# Patient Record
Sex: FEMALE | Race: WHITE | NOT HISPANIC OR LATINO | Employment: FULL TIME | URBAN - METROPOLITAN AREA
[De-identification: names, ages, dates, MRNs, and addresses within clinical notes are randomized per-mention and may not be internally consistent; named-entity substitution may affect disease eponyms.]

---

## 2019-12-05 ENCOUNTER — ALLIED HEALTH/NURSE VISIT (OUTPATIENT)
Dept: FAMILY MEDICINE | Facility: CLINIC | Age: 29
End: 2019-12-05
Payer: COMMERCIAL

## 2019-12-05 VITALS
HEART RATE: 80 BPM | DIASTOLIC BLOOD PRESSURE: 52 MMHG | WEIGHT: 156.8 LBS | HEIGHT: 65 IN | TEMPERATURE: 97.7 F | BODY MASS INDEX: 26.12 KG/M2 | SYSTOLIC BLOOD PRESSURE: 92 MMHG

## 2019-12-05 DIAGNOSIS — N91.2 ABSENCE OF MENSTRUATION: Primary | ICD-10-CM

## 2019-12-05 LAB — HCG UR QL: POSITIVE

## 2019-12-05 PROCEDURE — 81025 URINE PREGNANCY TEST: CPT | Performed by: FAMILY MEDICINE

## 2019-12-05 PROCEDURE — 99207 ZZC NO CHARGE NURSE ONLY: CPT

## 2019-12-05 SDOH — HEALTH STABILITY: MENTAL HEALTH: HOW OFTEN DO YOU HAVE 6 OR MORE DRINKS ON ONE OCCASION?: NEVER

## 2019-12-05 SDOH — HEALTH STABILITY: MENTAL HEALTH: HOW OFTEN DO YOU HAVE A DRINK CONTAINING ALCOHOL?: NEVER

## 2019-12-05 ASSESSMENT — MIFFLIN-ST. JEOR: SCORE: 1434.18

## 2019-12-05 NOTE — PROGRESS NOTES
Kassandra Mead is a 29 year old here today for a pregnancy test.  LMP: Patient's last menstrual period was 2019.  Wt: 156 lbs 12.8 oz.    Symptoms include absence of menses and low back pain.    Kassandra informed of positive pregnancy test results. DENY: 2020    Educational advice given: smoking.    Current medications reviewed: Yes    Previous pregnancy history remarkable for: none.    Plan: schedule appointment with OB Educator and/or OB class, take multivitamin or pre- vitamins and OB Education packet given.    She is to call back if she has any questions or concerns.  She is advised to notify a provider immediately if she experiences any severe cramping or abdominal pain or any vaginal bleeding.

## 2019-12-09 ENCOUNTER — PRENATAL OFFICE VISIT (OUTPATIENT)
Dept: FAMILY MEDICINE | Facility: CLINIC | Age: 29
End: 2019-12-09
Payer: COMMERCIAL

## 2019-12-09 VITALS — HEIGHT: 64 IN | BODY MASS INDEX: 26.98 KG/M2 | WEIGHT: 158 LBS

## 2019-12-09 DIAGNOSIS — Z34.90 SUPERVISION OF NORMAL PREGNANCY: Primary | ICD-10-CM

## 2019-12-09 LAB
ABO + RH BLD: NORMAL
ABO + RH BLD: NORMAL
BLD GP AB SCN SERPL QL: NORMAL
BLOOD BANK CMNT PATIENT-IMP: NORMAL
ERYTHROCYTE [DISTWIDTH] IN BLOOD BY AUTOMATED COUNT: 11.9 % (ref 10–15)
HCT VFR BLD AUTO: 45.2 % (ref 35–47)
HGB BLD-MCNC: 15.7 G/DL (ref 11.7–15.7)
MCH RBC QN AUTO: 31.8 PG (ref 26.5–33)
MCHC RBC AUTO-ENTMCNC: 34.7 G/DL (ref 31.5–36.5)
MCV RBC AUTO: 92 FL (ref 78–100)
PLATELET # BLD AUTO: 277 10E9/L (ref 150–450)
RBC # BLD AUTO: 4.93 10E12/L (ref 3.8–5.2)
SPECIMEN EXP DATE BLD: NORMAL
WBC # BLD AUTO: 7.5 10E9/L (ref 4–11)

## 2019-12-09 PROCEDURE — 86780 TREPONEMA PALLIDUM: CPT | Performed by: OBSTETRICS & GYNECOLOGY

## 2019-12-09 PROCEDURE — 87389 HIV-1 AG W/HIV-1&-2 AB AG IA: CPT | Performed by: OBSTETRICS & GYNECOLOGY

## 2019-12-09 PROCEDURE — 86850 RBC ANTIBODY SCREEN: CPT | Performed by: OBSTETRICS & GYNECOLOGY

## 2019-12-09 PROCEDURE — 86901 BLOOD TYPING SEROLOGIC RH(D): CPT | Performed by: OBSTETRICS & GYNECOLOGY

## 2019-12-09 PROCEDURE — 85027 COMPLETE CBC AUTOMATED: CPT | Performed by: OBSTETRICS & GYNECOLOGY

## 2019-12-09 PROCEDURE — 87086 URINE CULTURE/COLONY COUNT: CPT | Performed by: OBSTETRICS & GYNECOLOGY

## 2019-12-09 PROCEDURE — 87340 HEPATITIS B SURFACE AG IA: CPT | Performed by: OBSTETRICS & GYNECOLOGY

## 2019-12-09 PROCEDURE — 86762 RUBELLA ANTIBODY: CPT | Performed by: OBSTETRICS & GYNECOLOGY

## 2019-12-09 PROCEDURE — 99207 ZZC NO CHARGE NURSE ONLY: CPT

## 2019-12-09 PROCEDURE — 86900 BLOOD TYPING SEROLOGIC ABO: CPT | Performed by: OBSTETRICS & GYNECOLOGY

## 2019-12-09 PROCEDURE — 36415 COLL VENOUS BLD VENIPUNCTURE: CPT | Performed by: OBSTETRICS & GYNECOLOGY

## 2019-12-09 ASSESSMENT — MIFFLIN-ST. JEOR: SCORE: 1431.68

## 2019-12-10 LAB
HBV SURFACE AG SERPL QL IA: NONREACTIVE
HIV 1+2 AB+HIV1 P24 AG SERPL QL IA: NONREACTIVE
RUBV IGG SERPL IA-ACNC: 13 IU/ML
T PALLIDUM AB SER QL: NONREACTIVE

## 2019-12-11 ENCOUNTER — APPOINTMENT (OUTPATIENT)
Dept: ULTRASOUND IMAGING | Facility: CLINIC | Age: 29
End: 2019-12-11
Attending: FAMILY MEDICINE
Payer: COMMERCIAL

## 2019-12-11 ENCOUNTER — NURSE TRIAGE (OUTPATIENT)
Dept: NURSING | Facility: CLINIC | Age: 29
End: 2019-12-11

## 2019-12-11 ENCOUNTER — HOSPITAL ENCOUNTER (EMERGENCY)
Facility: CLINIC | Age: 29
Discharge: HOME | End: 2019-12-11
Attending: FAMILY MEDICINE | Admitting: FAMILY MEDICINE
Payer: COMMERCIAL

## 2019-12-11 VITALS
TEMPERATURE: 98.6 F | OXYGEN SATURATION: 97 % | RESPIRATION RATE: 18 BRPM | SYSTOLIC BLOOD PRESSURE: 127 MMHG | HEART RATE: 80 BPM | DIASTOLIC BLOOD PRESSURE: 68 MMHG

## 2019-12-11 DIAGNOSIS — N93.9 VAGINAL BLEEDING: ICD-10-CM

## 2019-12-11 DIAGNOSIS — O20.0 THREATENED MISCARRIAGE IN EARLY PREGNANCY: ICD-10-CM

## 2019-12-11 LAB
ALBUMIN UR-MCNC: NEGATIVE MG/DL
ANION GAP SERPL CALCULATED.3IONS-SCNC: 7 MMOL/L (ref 3–14)
APPEARANCE UR: CLEAR
B-HCG SERPL-ACNC: 6742 IU/L (ref 0–5)
BACTERIA SPEC CULT: NO GROWTH
BASOPHILS # BLD AUTO: 0 10E9/L (ref 0–0.2)
BASOPHILS NFR BLD AUTO: 0.4 %
BILIRUB UR QL STRIP: NEGATIVE
BUN SERPL-MCNC: 9 MG/DL (ref 7–30)
CALCIUM SERPL-MCNC: 8.2 MG/DL (ref 8.5–10.1)
CHLORIDE SERPL-SCNC: 105 MMOL/L (ref 94–109)
CO2 SERPL-SCNC: 23 MMOL/L (ref 20–32)
COLOR UR AUTO: ABNORMAL
CREAT SERPL-MCNC: 0.67 MG/DL (ref 0.52–1.04)
DIFFERENTIAL METHOD BLD: ABNORMAL
EOSINOPHIL NFR BLD AUTO: 1.1 %
ERYTHROCYTE [DISTWIDTH] IN BLOOD BY AUTOMATED COUNT: 11.9 % (ref 10–15)
GFR SERPL CREATININE-BSD FRML MDRD: >90 ML/MIN/{1.73_M2}
GLUCOSE SERPL-MCNC: 85 MG/DL (ref 70–99)
GLUCOSE UR STRIP-MCNC: NEGATIVE MG/DL
HCT VFR BLD AUTO: 46.5 % (ref 35–47)
HGB BLD-MCNC: 16.2 G/DL (ref 11.7–15.7)
HGB UR QL STRIP: ABNORMAL
IMM GRANULOCYTES # BLD: 0 10E9/L (ref 0–0.4)
IMM GRANULOCYTES NFR BLD: 0.2 %
KETONES UR STRIP-MCNC: NEGATIVE MG/DL
LEUKOCYTE ESTERASE UR QL STRIP: NEGATIVE
LYMPHOCYTES # BLD AUTO: 2.1 10E9/L (ref 0.8–5.3)
LYMPHOCYTES NFR BLD AUTO: 22.7 %
Lab: NORMAL
MCH RBC QN AUTO: 31.6 PG (ref 26.5–33)
MCHC RBC AUTO-ENTMCNC: 34.8 G/DL (ref 31.5–36.5)
MCV RBC AUTO: 91 FL (ref 78–100)
MONOCYTES # BLD AUTO: 0.6 10E9/L (ref 0–1.3)
MONOCYTES NFR BLD AUTO: 6.6 %
MUCOUS THREADS #/AREA URNS LPF: PRESENT /LPF
NEUTROPHILS # BLD AUTO: 6.4 10E9/L (ref 1.6–8.3)
NEUTROPHILS NFR BLD AUTO: 69 %
NITRATE UR QL: NEGATIVE
NRBC # BLD AUTO: 0 10*3/UL
NRBC BLD AUTO-RTO: 0 /100
PH UR STRIP: 6 PH (ref 5–7)
PLATELET # BLD AUTO: 272 10E9/L (ref 150–450)
POTASSIUM SERPL-SCNC: 3.7 MMOL/L (ref 3.4–5.3)
RBC # BLD AUTO: 5.12 10E12/L (ref 3.8–5.2)
RBC #/AREA URNS AUTO: 9 /HPF (ref 0–2)
SODIUM SERPL-SCNC: 135 MMOL/L (ref 133–144)
SOURCE: ABNORMAL
SP GR UR STRIP: 1.01 (ref 1–1.03)
SPECIMEN SOURCE: NORMAL
SQUAMOUS #/AREA URNS AUTO: <1 /HPF (ref 0–1)
UROBILINOGEN UR STRIP-MCNC: 0 MG/DL (ref 0–2)
WBC # BLD AUTO: 9.3 10E9/L (ref 4–11)
WBC #/AREA URNS AUTO: 2 /HPF (ref 0–5)

## 2019-12-11 PROCEDURE — 80048 BASIC METABOLIC PNL TOTAL CA: CPT | Performed by: FAMILY MEDICINE

## 2019-12-11 PROCEDURE — 99284 EMERGENCY DEPT VISIT MOD MDM: CPT | Mod: 25 | Performed by: FAMILY MEDICINE

## 2019-12-11 PROCEDURE — 81001 URINALYSIS AUTO W/SCOPE: CPT | Performed by: FAMILY MEDICINE

## 2019-12-11 PROCEDURE — 99284 EMERGENCY DEPT VISIT MOD MDM: CPT | Mod: Z6 | Performed by: FAMILY MEDICINE

## 2019-12-11 PROCEDURE — 76801 OB US < 14 WKS SINGLE FETUS: CPT

## 2019-12-11 PROCEDURE — 85025 COMPLETE CBC W/AUTO DIFF WBC: CPT | Performed by: FAMILY MEDICINE

## 2019-12-11 PROCEDURE — 84702 CHORIONIC GONADOTROPIN TEST: CPT | Performed by: FAMILY MEDICINE

## 2019-12-11 ASSESSMENT — ENCOUNTER SYMPTOMS
RESPIRATORY NEGATIVE: 1
FATIGUE: 0
DIARRHEA: 0
PSYCHIATRIC NEGATIVE: 1
FEVER: 0
CHILLS: 0
FLANK PAIN: 0
ABDOMINAL DISTENTION: 0
CARDIOVASCULAR NEGATIVE: 1
NAUSEA: 0
BLOOD IN STOOL: 0
ABDOMINAL PAIN: 1
EYES NEGATIVE: 1
MUSCULOSKELETAL NEGATIVE: 1
ENDOCRINE NEGATIVE: 1
NEUROLOGICAL NEGATIVE: 1
VOMITING: 0
APPETITE CHANGE: 0

## 2019-12-11 NOTE — TELEPHONE ENCOUNTER
Pt calls in with concern of abdominal pain - cramping     As well as spotting     Pt states she is 5 weeks pregnant   With first US scheduled for January 3    Pt says abdominal pain/cramping started approx 2 hours ago - fairly constant   Rates pain - 7 or 8 / 10    Also C/O spotting > when wiped there was blood on tissue     Per protocol pt advised to go to ED for evaluation     Protocol and care advice reviewed  Caller states understanding of the recommended dispositionAdvised to call back if further questions or concerns    Chris Henriquez RN / [place] Nurse Advisor          Reason for Disposition    MODERATE-SEVERE abdominal pain (e.g., interferes with normal activities, awakens from sleep)    Additional Information    Negative: Passed out (i.e., lost consciousness, collapsed and was not responding)    Negative: Shock suspected (e.g., cold/pale/clammy skin, too weak to stand, low BP, rapid pulse)    Negative: Difficult to awaken or acting confused (e.g., disoriented, slurred speech)    Negative: Sounds like a life-threatening emergency to the triager    Protocols used: PREGNANCY - ABDOMINAL PAIN LESS THAN 20 WEEKS EGA-A-

## 2019-12-11 NOTE — ED AVS SNAPSHOT
[facility] Emergency Department  [address] [place] [address]  Phone:  [phone]  Fax:  [phone]                                    Kassandra Mead   MRN: [id number]    Department:  [facility] Emergency Department   Date of Visit:  12/11/2019           After Visit Summary Signature Page    I have received my discharge instructions, and my questions have been answered. I have discussed any challenges I see with this plan with the nurse or doctor.    ..........................................................................................................................................  Patient/Patient Representative Signature      ..........................................................................................................................................  Patient Representative Print Name and Relationship to Patient    ..................................................               ................................................  Date                                   Time    ..........................................................................................................................................  Reviewed by Signature/Title    ...................................................              ..............................................  Date                                               Time          22EPIC Rev 08/18

## 2019-12-12 NOTE — ED TRIAGE NOTES
Presents to ED with cramping that started at 1530 and noticed vaginal bleeding around 1620. Patient reports when she was evaluated last week and estimates 4-5 weeks pregnant.

## 2019-12-12 NOTE — ED PROVIDER NOTES
History     Chief Complaint   Patient presents with     Vaginal Bleeding     The history is provided by the patient.     Kassandra Mead is a 29 year old female who presents to the ER with concerns about vaginal bleeding and abdominal cramping. The patient states that she is about four to five weeks pregnant. She is in the ED today because she has a steady pain in the right side of her abdomen that started around 1530. While she was at work, around 1620, her vaginal bleeding started. 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 This is the patient's fourth pregnancy. She notes having no previous issues with bleeding or complications with this pregnancy or during her other pregnancies. She denies any new medications other than prenatal vitamins. The patient was seen by OB two days ago and was told everything looked normal other than her blood pressure being slightly low. She has not felt weak or dizzy. No urinary symptoms. She claims that the vaginal bleeding was like a normal period at first, but is now just spotting.         Birth Date: [birth year] Age (as of 19): 29 Ethnicity: American Race: White   History:  Estimated Date of Delivery: 20 Gestational Age: 5w1d Blood Type: A Pos      Absence of menstruation   Dx   Pregnancy Test   Reason for Visit    Progress Notes   Rosibel Judd, RN (Registered Nurse)      Kassandra Mead is a 29 year old here today for a pregnancy test.  LMP: Patient's last menstrual period was 2019.  Wt: 156 lbs 12.8 oz.     Symptoms include absence of menses and low back pain.     Kassandra informed of positive pregnancy test results. DENY: 2020             Allergies:  No Known Allergies    Problem List:    There are no active problems to display for this patient.       Past Medical History:    Past Medical History:   Diagnosis Date     NO ACTIVE PROBLEMS        Past Surgical History:    Past Surgical History:   Procedure  "Laterality Date     NO HISTORY OF SURGERY         Family History:    Family History   Problem Relation Age of Onset     Alopecia Mother      Cervical Cancer Mother      Asthma Father      No Known Problems Sister      Blood Disease Maternal Grandmother      No Known Problems Paternal Grandmother      Heart Disease Paternal Grandfather         \"valve problem\"     No Known Problems Daughter      Asthma Daughter         \"septic\"     No Known Problems Son        Social History:  Marital Status:  Single [1]  Social History     Tobacco Use     Smoking status: Current Every Day Smoker     Packs/day: 1.00     Years: 13.00     Pack years: 13.00     Start date: 12/5/2006     Smokeless tobacco: Never Used   Substance Use Topics     Alcohol use: Not Currently     Frequency: Never     Binge frequency: Never     Drug use: Not Currently        Medications:    Prenatal w/o A Vit-Fe Fum-FA (PRENATA PO)          Review of Systems   Constitutional: Negative for appetite change, chills, fatigue and fever.   HENT: Negative.    Eyes: Negative.    Respiratory: Negative.    Cardiovascular: Negative.    Gastrointestinal: Positive for abdominal pain (RLQ -suprapubic area pain). Negative for abdominal distention, blood in stool, diarrhea, nausea and vomiting.   Endocrine: Negative.    Genitourinary: Positive for pelvic pain (Right-Mid area) and vaginal bleeding. Negative for flank pain.   Musculoskeletal: Negative.    Skin: Negative.    Neurological: Negative.    Psychiatric/Behavioral: Negative.    All other systems reviewed and are negative.      Physical Exam   BP: 127/68  Pulse: 80  Heart Rate: 86  Temp: 98.6  F (37  C)  Resp: 18  SpO2: 97 %      Physical Exam  Vitals signs and nursing note reviewed.   Constitutional:       General: She is in acute distress (Mild - Refused offer of pain medication at the time of exam.).      Appearance: Normal appearance. She is normal weight. She is not ill-appearing, toxic-appearing or diaphoretic. "   HENT:      Head: Normocephalic and atraumatic.      Mouth/Throat:      Mouth: Mucous membranes are moist.   Eyes:      Extraocular Movements: Extraocular movements intact.      Conjunctiva/sclera: Conjunctivae normal.      Pupils: Pupils are equal, round, and reactive to light.   Neck:      Musculoskeletal: Normal range of motion and neck supple.   Cardiovascular:      Rate and Rhythm: Normal rate.   Pulmonary:      Effort: Pulmonary effort is normal. No respiratory distress.   Abdominal:      General: Bowel sounds are normal. There is no distension.      Palpations: There is no mass.      Tenderness: There is abdominal tenderness in the right lower quadrant and suprapubic area. There is no right CVA tenderness, left CVA tenderness, guarding or rebound.      Hernia: No hernia is present.       Musculoskeletal: Normal range of motion.   Skin:     General: Skin is warm.      Capillary Refill: Capillary refill takes less than 2 seconds.   Neurological:      General: No focal deficit present.      Mental Status: She is alert and oriented to person, place, and time.   Psychiatric:         Mood and Affect: Mood normal.         Behavior: Behavior normal.         Thought Content: Thought content normal.         Judgment: Judgment normal.         ED Course        Procedures            Critical Care time:  none            Results for orders placed or performed during the hospital encounter of 12/11/19 (from the past 24 hour(s))   UA reflex to Microscopic and Culture   Result Value Ref Range    Color Urine Straw     Appearance Urine Clear     Glucose Urine Negative NEG^Negative mg/dL    Bilirubin Urine Negative NEG^Negative    Ketones Urine Negative NEG^Negative mg/dL    Specific Gravity Urine 1.006 1.003 - 1.035    Blood Urine Large (A) NEG^Negative    pH Urine 6.0 5.0 - 7.0 pH    Protein Albumin Urine Negative NEG^Negative mg/dL    Urobilinogen mg/dL 0.0 0.0 - 2.0 mg/dL    Nitrite Urine Negative NEG^Negative    Leukocyte  Esterase Urine Negative NEG^Negative    Source Midstream Urine     RBC Urine 9 (H) 0 - 2 /HPF    WBC Urine 2 0 - 5 /HPF    Squamous Epithelial /HPF Urine <1 0 - 1 /HPF    Mucous Urine Present (A) NEG^Negative /LPF   CBC with platelets differential   Result Value Ref Range    WBC 9.3 4.0 - 11.0 10e9/L    RBC Count 5.12 3.8 - 5.2 10e12/L    Hemoglobin 16.2 (H) 11.7 - 15.7 g/dL    Hematocrit 46.5 35.0 - 47.0 %    MCV 91 78 - 100 fl    MCH 31.6 26.5 - 33.0 pg    MCHC 34.8 31.5 - 36.5 g/dL    RDW 11.9 10.0 - 15.0 %    Platelet Count 272 150 - 450 10e9/L    Diff Method Automated Method     % Neutrophils 69.0 %    % Lymphocytes 22.7 %    % Monocytes 6.6 %    % Eosinophils 1.1 %    % Basophils 0.4 %    % Immature Granulocytes 0.2 %    Nucleated RBCs 0 0 /100    Absolute Neutrophil 6.4 1.6 - 8.3 10e9/L    Absolute Lymphocytes 2.1 0.8 - 5.3 10e9/L    Absolute Monocytes 0.6 0.0 - 1.3 10e9/L    Absolute Basophils 0.0 0.0 - 0.2 10e9/L    Abs Immature Granulocytes 0.0 0 - 0.4 10e9/L    Absolute Nucleated RBC 0.0    Basic metabolic panel   Result Value Ref Range    Sodium 135 133 - 144 mmol/L    Potassium 3.7 3.4 - 5.3 mmol/L    Chloride 105 94 - 109 mmol/L    Carbon Dioxide 23 20 - 32 mmol/L    Anion Gap 7 3 - 14 mmol/L    Glucose 85 70 - 99 mg/dL    Urea Nitrogen 9 7 - 30 mg/dL    Creatinine 0.67 0.52 - 1.04 mg/dL    GFR Estimate >90 >60 mL/min/[1.73_m2]    GFR Estimate If Black >90 >60 mL/min/[1.73_m2]    Calcium 8.2 (L) 8.5 - 10.1 mg/dL   HCG quantitative pregnancy   Result Value Ref Range    HCG Quantitative Serum 6,742 (H) 0 - 5 IU/L   US OB <14 Weeks W Transvaginal    Narrative    ULTRASOUND OBSTETRIC FIRST TRIMESTER WITH TRANSVAGINAL  12/11/2019  9:05 PM    HISTORY: Right pelvic pain, early pregnancy. Vaginal bleeding.    TECHNIQUE: Transabdominal and transvaginal imaging were performed.   Transvaginal imaging was performed to better evaluate the uterus and  gestational sac.    COMPARISON:  None.     FINDINGS:       Estimated gestational age by current ultrasound measurement: 5 weeks 2  days   by mean sac diameter.  Crown-rump length: Not seen.  Embryonic cardiac activity: Not seen.   Yolk sac: Present. Unremarkable shape.  Subchorionic hemorrhage: Subchorionic hemorrhages on the right  measuring 1.4 x 0.7 x 1.3 cm, and right lateral measuring 1.3 x 1.3 x  0.5 cm  Amniotic fluid volume:  Unremarkable for gestational age.    Right ovary: Unremarkable.  Left ovary: Unremarkable.  Adnexal mass: None.  Free pelvic fluid: Small amount.      Impression    IMPRESSION: Gestational sac in the uterus, however a definitive fetal  pole is not identified. Close interval followup recommended to confirm  development of the fetal pole. No evidence for hemorrhage.      NIKI SHERMAN MD           Assessments & Plan (with Medical Decision Making)  21-year-old female to the ER secondary concerns of some lower abdominal cramping symptoms as well as vaginal bleeding that is started earlier this evening.  The bleeding has now slowed.  Patient states that she is with a early pregnancy.  States that this is her fourth pregnancy.  Patient with exam findings consistent with intrauterine pregnancy however it is very early at just over 5 weeks gestational age.  No fetal pole is identified today.  There is some evidence for subchorionic bleeding.  I spoke with the patient about the possibility of pending miscarriage but also the possibility is just too young to see evidence of the fetus forming as yet and that the pregnancy could progress normally.  We did talk about blighted ovum as a possibility.  We also spent some time talking about miscarriage and signs and symptoms as well as what would be concerning and when to return to the ER if noted.  I placed a future order in the epic EMR for repeat beta-hCG level to be done on Monday with results to be forwarded to her obstetrician.  She felt comfortable with the plan of care and desired to return home at  this time.  Her bleeding had essentially stopped during the ER stay.     I have reviewed the nursing notes.    I have reviewed the findings, diagnosis, plan and need for follow up with the patient.       Final diagnoses:   Threatened miscarriage in early pregnancy   Vaginal bleeding           This document serves as a record of services personally performed by Michael Borrego,*. It was created on their behalf by Karlene Foley, a trained medical scribe. The creation of this record is based on the provider's personal observations and the statements of the patient. This document has been checked and approved by the attending provider.  Note: Chart documentation done in part with Dragon Voice Recognition software. Although reviewed after completion, some word and grammatical errors may remain.  12/11/2019   [facility] EMERGENCY DEPARTMENT     Michael Borrego,   12/12/19 0202

## 2019-12-12 NOTE — DISCHARGE INSTRUCTIONS
Please read and follow the handout(s) instructions. Return, if needed, for increased or worsening symptoms and as directed by the handout(s).    Follow-up ion the lab for the repeat pregnancy blood test this next week - Monday

## 2019-12-14 ENCOUNTER — OFFICE VISIT (OUTPATIENT)
Dept: URGENT CARE | Facility: RETAIL CLINIC | Age: 29
End: 2019-12-14
Payer: COMMERCIAL

## 2019-12-14 VITALS
SYSTOLIC BLOOD PRESSURE: 106 MMHG | OXYGEN SATURATION: 94 % | TEMPERATURE: 98.1 F | HEART RATE: 104 BPM | DIASTOLIC BLOOD PRESSURE: 67 MMHG

## 2019-12-14 DIAGNOSIS — J20.9 ACUTE BRONCHITIS WITH COEXISTING CONDITION REQUIRING PROPHYLACTIC TREATMENT: Primary | ICD-10-CM

## 2019-12-14 DIAGNOSIS — R05.9 COUGH: ICD-10-CM

## 2019-12-14 LAB
FLUAV AG UPPER RESP QL IA.RAPID: NORMAL
FLUBV AG UPPER RESP QL IA.RAPID: NORMAL

## 2019-12-14 PROCEDURE — 87804 INFLUENZA ASSAY W/OPTIC: CPT | Mod: QW | Performed by: NURSE PRACTITIONER

## 2019-12-14 PROCEDURE — 99203 OFFICE O/P NEW LOW 30 MIN: CPT | Performed by: NURSE PRACTITIONER

## 2019-12-14 RX ORDER — AZITHROMYCIN 250 MG/1
TABLET, FILM COATED ORAL
Qty: 6 TABLET | Refills: 0 | Status: SHIPPED | OUTPATIENT
Start: 2019-12-14 | End: 2020-01-20

## 2019-12-14 ASSESSMENT — ENCOUNTER SYMPTOMS
FATIGUE: 0
FEVER: 0
SINUS PAIN: 0
CHILLS: 1
DIZZINESS: 0
SLEEP DISTURBANCE: 1
ADENOPATHY: 0
WHEEZING: 1
HEADACHES: 0
SORE THROAT: 0
HEARTBURN: 0
CHEST TIGHTNESS: 0
SHORTNESS OF BREATH: 0
MYALGIAS: 1
COUGH: 1
DIAPHORESIS: 1
EYE ITCHING: 0

## 2019-12-14 NOTE — PROGRESS NOTES
Chief Complaint   Patient presents with     Cough     started yesterday     SUBJECTIVE:  Kassandra Mead is a 29 year old female who presents to the clinic today with a chief complaint of cough, thick mucus, wheezing, feverish, body aches for 2 days. This feels like her typical acute bronchitis, she says if she waits too long for antibiotic treatment it will develop into pneumonia. She is pregnant.  The patient's symptoms are moderate and worsening.  The patient's symptoms are exacerbated by no particular triggers.  Patient has been using nothing to improve symptoms.  Predisposing factors include: current everyday smoker.    Past Medical History:   Diagnosis Date     NO ACTIVE PROBLEMS      Prenatal w/o A Vit-Fe Fum-FA (PRENATA PO),     No current facility-administered medications on file prior to visit.     Social History     Tobacco Use     Smoking status: Current Every Day Smoker     Packs/day: 1.00     Years: 13.00     Pack years: 13.00     Start date: 12/5/2006     Smokeless tobacco: Never Used   Substance Use Topics     Alcohol use: Not Currently     Frequency: Never     Binge frequency: Never     No Known Allergies    Review of Systems   Constitutional: Positive for chills and diaphoresis. Negative for fatigue and fever.   HENT: Positive for congestion. Negative for ear pain, postnasal drip, sinus pain and sore throat.    Eyes: Negative for itching.   Respiratory: Positive for cough and wheezing. Negative for chest tightness and shortness of breath.    Gastrointestinal: Negative for heartburn.   Musculoskeletal: Positive for myalgias.   Allergic/Immunologic: Negative for environmental allergies.   Neurological: Negative for dizziness and headaches.   Hematological: Negative for adenopathy.   Psychiatric/Behavioral: Positive for sleep disturbance.     /67   Pulse 104   Temp 98.1  F (36.7  C) (Oral)   LMP 11/05/2019   SpO2 94%     Physical Exam  Constitutional:       General: She is not in acute  distress.     Appearance: She is well-developed. She is not diaphoretic.   HENT:      Head: Normocephalic and atraumatic.      Right Ear: External ear normal.      Left Ear: External ear normal.      Nose: Congestion present.      Mouth/Throat:      Mouth: Mucous membranes are moist.      Pharynx: Oropharynx is clear.   Eyes:      Pupils: Pupils are equal, round, and reactive to light.   Neck:      Musculoskeletal: Normal range of motion and neck supple.   Cardiovascular:      Rate and Rhythm: Normal rate.   Pulmonary:      Effort: Respiratory distress (occasional cough with upper airway congestion) present.      Breath sounds: Normal breath sounds. No stridor. No wheezing, rhonchi or rales.   Chest:      Chest wall: No tenderness.   Musculoskeletal: Normal range of motion.   Lymphadenopathy:      Cervical: No cervical adenopathy.   Skin:     General: Skin is warm and dry.      Capillary Refill: Capillary refill takes less than 2 seconds.      Coloration: Skin is not pale.      Findings: No rash.   Neurological:      General: No focal deficit present.      Mental Status: She is alert and oriented to person, place, and time.      Coordination: Coordination normal.   Psychiatric:         Mood and Affect: Mood normal.         Behavior: Behavior normal.       Results for orders placed or performed in visit on 12/14/19   INFLUENZA A/B ANTIGEN     Status: Normal   Result Value Ref Range    Influenza A neg neg    Influenza B neg neg     ASSESSMENT:    ICD-10-CM    1. Acute bronchitis with coexisting condition requiring prophylactic treatment J20.9 azithromycin (ZITHROMAX) 250 MG tablet   2. Cough R05 INFLUENZA A/B ANTIGEN     PLAN:   Patient Instructions   Flu test was negative will treat as typical acute bronchitis given history and pregnancy.  Continue albuterol every 4-6 hours as needed.  Use Tylenol as needed for pain and fever relief.  Drink plenty of fluids (warm fluids like tea or soup are soothing and reduce  cough)  Rest! Your body needs more rest to heal.  Sit in the bathroom with a hot shower running and breathe in the steam.  Saline drops or spay may help to clear nasal passages.  Honey may soothe your sore throat and help manage your cough- may take straight or in warm water with lemon juice.  Mucinex or Robitussin (guiafenesin) thin mucus and may help it to loosen more quickly  Avoid smoke (cigarettes, bonfires, fireplace, wood burning stoves).  It may take 14 days for symptoms to completely go away.  A cough may persist for 3-4 weeks.  Good handwashing is the best way to prevent spread of germs.  Let OBGYN know of treatment plan  Follow up with your pcp if symptoms worsen or fail to improve as expected.  ED if no improvement in 24 hours, worsening shortness of breath, chest pain, brick sputum, dizziness, high fever    Follow up with primary care provider with any problems, questions or concerns or if symptoms worsen or fail to improve. Patient agreed to plan and verbalized understanding.    Brea Ramirez, VALENTINA-BC  [facility] [place]

## 2019-12-14 NOTE — PATIENT INSTRUCTIONS
Flu test was negative will treat as typical acute bronchitis given history and pregnancy.  Continue albuterol every 4-6 hours as needed.  Use Tylenol as needed for pain and fever relief.  Drink plenty of fluids (warm fluids like tea or soup are soothing and reduce cough)  Rest! Your body needs more rest to heal.  Sit in the bathroom with a hot shower running and breathe in the steam.  Saline drops or spay may help to clear nasal passages.  Honey may soothe your sore throat and help manage your cough- may take straight or in warm water with lemon juice.  Mucinex or Robitussin (guiafenesin) thin mucus and may help it to loosen more quickly  Avoid smoke (cigarettes, bonfires, fireplace, wood burning stoves).  It may take 14 days for symptoms to completely go away.  A cough may persist for 3-4 weeks.  Good handwashing is the best way to prevent spread of germs.  Let OBGYN know of treatment plan  Follow up with your pcp if symptoms worsen or fail to improve as expected.  ED if no improvement in 24 hours, worsening shortness of breath, chest pain, brick sputum, dizziness, high fever

## 2019-12-16 ENCOUNTER — MYC MEDICAL ADVICE (OUTPATIENT)
Dept: OBGYN | Facility: CLINIC | Age: 29
End: 2019-12-16

## 2019-12-16 DIAGNOSIS — O20.9 VAGINAL BLEEDING AFFECTING EARLY PREGNANCY: Primary | ICD-10-CM

## 2019-12-16 DIAGNOSIS — N93.9 VAGINAL BLEEDING: ICD-10-CM

## 2019-12-16 DIAGNOSIS — O20.0 THREATENED MISCARRIAGE IN EARLY PREGNANCY: ICD-10-CM

## 2019-12-16 LAB — B-HCG SERPL-ACNC: ABNORMAL IU/L (ref 0–5)

## 2019-12-16 PROCEDURE — 84702 CHORIONIC GONADOTROPIN TEST: CPT | Performed by: FAMILY MEDICINE

## 2019-12-16 PROCEDURE — 36415 COLL VENOUS BLD VENIPUNCTURE: CPT | Performed by: FAMILY MEDICINE

## 2019-12-16 NOTE — RESULT ENCOUNTER NOTE
Patient seen in ED on 12/11/19  ED Provider placed order for HCG Quant for Monday  Routed to OB/Gyn Provider, Ekta Hernandez DO Erich Halberg, JACQUI  [facility]  Emergency Dept Lab Result RN  Ph# [phone]

## 2019-12-16 NOTE — TELEPHONE ENCOUNTER
Per Dr. Hernandez, a new order for ultra sound has been placed. RN notified patient via [employer] communication.     Aby Tena RN on 12/16/2019 at 1:51 PM

## 2019-12-16 NOTE — TELEPHONE ENCOUNTER
Please see result note for patient.    Result Notes for HCG quantitative pregnancy     Notes recorded by Ekta Hernandez DO on 12/16/2019 at 10:13 AM CST  Please call with results. HCG levels rising as expected. Recommend follow-up ultrasound in 7-10 days. If she has no further bleeding, she may also wait until already scheduled ultrasound on 1/2. If she would like it sooner, I can place a new order.     Hollie Layton RN on 12/16/2019 at 11:13 AM

## 2019-12-18 ENCOUNTER — ANCILLARY PROCEDURE (OUTPATIENT)
Dept: ULTRASOUND IMAGING | Facility: OTHER | Age: 29
End: 2019-12-18
Attending: OBSTETRICS & GYNECOLOGY
Payer: COMMERCIAL

## 2019-12-18 DIAGNOSIS — O20.9 VAGINAL BLEEDING AFFECTING EARLY PREGNANCY: ICD-10-CM

## 2019-12-18 PROCEDURE — 76805 OB US >/= 14 WKS SNGL FETUS: CPT

## 2020-01-02 ENCOUNTER — MYC MEDICAL ADVICE (OUTPATIENT)
Dept: OBGYN | Facility: CLINIC | Age: 30
End: 2020-01-02

## 2020-01-02 ENCOUNTER — HOSPITAL ENCOUNTER (OUTPATIENT)
Dept: ULTRASOUND IMAGING | Facility: CLINIC | Age: 30
End: 2020-01-02
Attending: OBSTETRICS & GYNECOLOGY
Payer: COMMERCIAL

## 2020-01-02 DIAGNOSIS — O21.9 VOMITING OR NAUSEA OF PREGNANCY: Primary | ICD-10-CM

## 2020-01-02 PROCEDURE — 76801 OB US < 14 WKS SINGLE FETUS: CPT

## 2020-01-02 RX ORDER — ONDANSETRON 4 MG/1
4 TABLET, ORALLY DISINTEGRATING ORAL EVERY 8 HOURS PRN
Qty: 20 TABLET | Refills: 1 | Status: SHIPPED | OUTPATIENT
Start: 2020-01-02 | End: 2020-02-18

## 2020-01-02 NOTE — TELEPHONE ENCOUNTER
Ekta Hernandez DO Netland, Heidi, RN; [facility] Ob/Gyn Triage 4 minutes ago (11:42 AM)     Rx for Zofran has been sent. Please see me in the office sooner if persistent vomiting. May also try B6 and Unisom over the counter .     Ekta Hernandez DO        RN relayed provider message via [employer] communication.     Aby Tena RN on 1/2/2020 at 11:47 AM

## 2020-01-10 NOTE — PROGRESS NOTES
"New OB Visit    Kassandra Mead is a 29 year old  at 9w3d by LMP c/w 6w2d US who presents today for a new OB exam, she desires this pregnancy. Since her LMP, has experienced  vaginal bleeding, which has since stopped. She has also complained of nausea and vomiting in early pregnancy, which has been controlled with Zofran. She denies headache, vaginal discharge, pelvic pain, lightheadedness, urinary frequency, hemorrhoids and constipation.    Pregnancy complicated by.  -Current everyday smoker, 1 ppd x13yrs. Has already been given information for quitting. She plans to call the quitline today and is considering nicotine patches. Partner is also longtime smoker and planning to quit as well.    Have you travelled during the pregnancy?No  Have your sexual partner(s) travelled during the pregnancy?No      Ob Hx:  s/p SVDx3, uncomplicated.  Last delivery .  Past Medical History of Father of Baby: No significant medical history    Gyn Hx: Patient's last menstrual period was 2019.     Last pap was 2018 NIL, No history of abnormal paps. Next pap due 2021   STI history denies      Family history genetic disorders, cystic fibrosis, SMA, intellectual disability or autism- denies    Most Recent Immunizations   Administered Date(s) Administered     Tdap (Adult) Unspecified Formulation 2014        PMH:   Past Medical History:   Diagnosis Date     NO ACTIVE PROBLEMS        SurgHx:   Past Surgical History:   Procedure Laterality Date     NO HISTORY OF SURGERY         FamHx:   Family History   Problem Relation Age of Onset     Alopecia Mother      Cervical Cancer Mother      Asthma Father      No Known Problems Sister      Blood Disease Maternal Grandmother      No Known Problems Paternal Grandmother      Heart Disease Paternal Grandfather         \"valve problem\"     No Known Problems Daughter      Asthma Daughter         \"septic\"     No Known Problems Son        SocHx:   Social History "     Socioeconomic History     Marital status: Single     Spouse name: Not on file     Number of children: Not on file     Years of education: Not on file     Highest education level: Not on file   Occupational History     Not on file   Social Needs     Financial resource strain: Not on file     Food insecurity:     Worry: Not on file     Inability: Not on file     Transportation needs:     Medical: Not on file     Non-medical: Not on file   Tobacco Use     Smoking status: Current Every Day Smoker     Packs/day: 1.00     Years: 13.00     Pack years: 13.00     Start date: 12/5/2006     Smokeless tobacco: Never Used   Substance and Sexual Activity     Alcohol use: Not Currently     Frequency: Never     Binge frequency: Never     Drug use: Not Currently     Sexual activity: Yes     Partners: Male   Lifestyle     Physical activity:     Days per week: Not on file     Minutes per session: Not on file     Stress: Not on file   Relationships     Social connections:     Talks on phone: Not on file     Gets together: Not on file     Attends Faith service: Not on file     Active member of club or organization: Not on file     Attends meetings of clubs or organizations: Not on file     Relationship status: Not on file     Intimate partner violence:     Fear of current or ex partner: Not on file     Emotionally abused: Not on file     Physically abused: Not on file     Forced sexual activity: Not on file   Other Topics Concern     Not on file   Social History Narrative    12/2019  Lives in [place] with Murray ROWAN, 2 daughters and 1 son.  Kassandra and Murray smoke.  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   No indoor cats/kittens.   No concerns about domestic violence.         Allergies:   Patient has no known allergies.    Medications:   ondansetron (ZOFRAN-ODT) 4 MG ODT tab, Take 1 tablet (4 mg) by mouth every 8 hours as needed for nausea  Prenatal w/o A Vit-Fe Fum-FA (PRENATA PO),   azithromycin (ZITHROMAX) 250 MG  "tablet, 2 tabs today, 1 tab for 4 days (Patient not taking: Reported on 1/13/2020)    No current facility-administered medications on file prior to visit.       Past medical, surgical, social and family history were reviewed and updated in Epic.      ROS: As described in HPI, otherwise negative for fever/chills, fatigue, dizziness, changes or new deficits in vision, worrisome rashes, new lumps or masses, cough/SOB/CP, GI distress, dysuria, abnormal vaginal discharge, constipation/diarrhea, neurological deficits, or other systemic complaints    EXAM:  Vitals: /60   Pulse 89   Ht 1.651 m (5' 5\")   Wt 73.2 kg (161 lb 4.8 oz)   LMP 11/05/2019   BMI 26.84 kg/m    BMI= Body mass index is 26.84 kg/m .    TA BSUS: FHT 150s, adequate fluid    Gen: Alert, oriented, appropriately interactive, NAD  Neck: soft, no cervical adenopathy, no masses  CV: RRR, no murmurs, no extra heart sounds, 2+ peripheral pulses  Resp: CTAB, good effort without distress   Breasts: no axillary adenopathy, no dominant masses, no skin changes, no nipple discharge, nontender, inverted nipples  Abdomen: soft, gravid, non tender, non distended, no masses, no hernias. No inguinal lymphadenopathy  External genitalia: no lesions; normal appearing external genitalia, bartholins glands, urethra, skenes glands  Vagina: no masses or lesions, normally rugated, appropriate clear-mucous discharge.  Cervix: no masses or lesions or discharge   Bimanual exam:   Urethra nontender   Bladder: nontender and without massess, well supported   Uterus: midline , anteverted, mobile , no masses, non-tender, gravid at 9 weeks  Adnexa: no masses or tenderness   No cervical motion tenderness  Lower extremities: non-tender, no edema  Skin: no lesions or rashes      Labs & Imaging:  Recent Labs   Lab Test 12/09/19 1852   ABO A   RH Pos   AS Neg       Recent Labs   Lab Test 12/09/19 1852   HEPBANG Nonreactive   HIAGAB Nonreactive   RUQIGG 13       Treponemal antibody " NR    CBC RESULTS:   Recent Labs   Lab Test 19  1940   WBC 9.3   RBC 5.12   HGB 16.2*   HCT 46.5   MCV 91   MCH 31.6   MCHC 34.8   RDW 11.9          Results for orders placed or performed during the hospital encounter of 20    OB < 14 weeks, single,  for dating ([id number])    Narrative    ULTRASOUND OB LESS THAN 14 WEEKS SINGLE  2020 8:09 AM    HISTORY: Supervision of normal pregnancy.    TECHNIQUE: Transabdominal imaging was performed.      COMPARISON:  2019.    FINDINGS:      Estimated gestational age by current ultrasound measurement: 8 weeks 2  days.  Estimated date of delivery based on this ultrasound: 2020.  Patient reported LMP: 11/15/2019.  Estimated gestational age by reported LMP: 8 weeks 2 days.    Crown-rump length: 1.7 cm.   Embryonic cardiac activity: 180 bpm.   Yolk sac: Present.  Subchorionic hemorrhage: There is a small subchorionic hemorrhage  measuring 1.5 x 0.4 x 0.7 cm.    Gestational sac shape: Grossly within normal limits.  Amniotic fluid volume: Qualitatively within normal limits.    Right ovary: Probable corpus luteum cyst measures 1.5 x 1.3 x 1.5 cm.  Left ovary: Unremarkable.  Adnexal mass: None.  Free pelvic fluid: None.      Impression    IMPRESSION: Single, live, intrauterine gestation of 8 weeks 2 days  gestational age. There is a small subchorionic hemorrhage which has  decreased in size since the prior study.    JOB IGNACIO MD         ASSESSMENT/PLAN: Kassandra Mead is a 29 year old  at 9w3d by LMP c/w 6w2d US who presents today for her first ob visit      ICD-10-CM    1. Normal pregnancy in multigravida in first trimester Z34.81        Pregnancy complications:  -Current everyday smoker, 1 ppd x13yrs. Has already been given information for quitting. She plans to call the quitline today and is considering nicotine patches. Partner is also longtime smoker and planning to quit as well. Discussed options for treatment, including utilizing  quitline, quitting gradually or cold, acupuncture, CBT, versus medical therapy, such as the nicotine patches. She has already received a box of the patches and is considering using. She was encouraged to call the Quitline and pick a quit date. She is committed to smoking cessation and has good support from family and coworkers. Counseled on potential negative impacts on pregnancy while smoking, including growth restriction, placenta previa, abruptio placentae, decreased maternal thyroid function,  premature rupture of membranes, low birth weight, and  mortality.  -Nausea in pregnancy, controlled with anne and zofran, gaining weight in pregnancy  -Vaginal bleeding early pregnancy, now resolved    Routine Prenatal Care:  -Discussed genetic screening options, including sequential and quad testing/AFP, cell-free DNA as well as diagnostic testing including amniocentesis. Patient would like Quad test at 15-18wks. Will order at next visit.  -Discussed Cystic Fibrosis and SMA carrier screening, the patient to think about further  -Discussed labs and ultrasound, all questions answered.  Early GCT not needed  -Discussed benefits of breastfeeding. She has inverted nipples and has never tried breastfeeding, but is open to it. Encouraged her to try as desired, and gave information about how can be achieved with inverted nipples. Also informed that we have a lactation consultant on L+D who can also help after delivery.  -Folder previously given, outlining physician coverage, exercise, weight gain, schedule of visits, routine and indicated ultrasounds, prenatal vitamins and childbirth education. She would like to deliver at [facility].    Body mass index is 26.84 kg/m . - recommend 15-25 lbs (BMI 25-29.9)  - Influenza vaccine - given today    Return in 4 weeks for routine OB care      Ekta Hernandez DO  1/10/2020 3:22 PM

## 2020-01-13 ENCOUNTER — PRENATAL OFFICE VISIT (OUTPATIENT)
Dept: OBGYN | Facility: CLINIC | Age: 30
End: 2020-01-13
Payer: COMMERCIAL

## 2020-01-13 VITALS
DIASTOLIC BLOOD PRESSURE: 60 MMHG | HEIGHT: 65 IN | SYSTOLIC BLOOD PRESSURE: 108 MMHG | WEIGHT: 161.3 LBS | HEART RATE: 89 BPM | BODY MASS INDEX: 26.87 KG/M2

## 2020-01-13 DIAGNOSIS — Z23 NEED FOR PROPHYLACTIC VACCINATION AND INOCULATION AGAINST INFLUENZA: ICD-10-CM

## 2020-01-13 DIAGNOSIS — Z34.81 NORMAL PREGNANCY IN MULTIGRAVIDA IN FIRST TRIMESTER: Primary | ICD-10-CM

## 2020-01-13 DIAGNOSIS — Z11.3 SCREEN FOR STD (SEXUALLY TRANSMITTED DISEASE): ICD-10-CM

## 2020-01-13 PROCEDURE — 87491 CHLMYD TRACH DNA AMP PROBE: CPT | Performed by: OBSTETRICS & GYNECOLOGY

## 2020-01-13 PROCEDURE — 90471 IMMUNIZATION ADMIN: CPT | Performed by: OBSTETRICS & GYNECOLOGY

## 2020-01-13 PROCEDURE — 99213 OFFICE O/P EST LOW 20 MIN: CPT | Mod: 25 | Performed by: OBSTETRICS & GYNECOLOGY

## 2020-01-13 PROCEDURE — 87591 N.GONORRHOEAE DNA AMP PROB: CPT | Performed by: OBSTETRICS & GYNECOLOGY

## 2020-01-13 PROCEDURE — 90686 IIV4 VACC NO PRSV 0.5 ML IM: CPT | Performed by: OBSTETRICS & GYNECOLOGY

## 2020-01-13 ASSESSMENT — MIFFLIN-ST. JEOR: SCORE: 1462.53

## 2020-01-14 LAB
C TRACH DNA SPEC QL NAA+PROBE: NEGATIVE
N GONORRHOEA DNA SPEC QL NAA+PROBE: NEGATIVE
SPECIMEN SOURCE: NORMAL
SPECIMEN SOURCE: NORMAL

## 2020-01-19 ENCOUNTER — HOSPITAL ENCOUNTER (EMERGENCY)
Facility: CLINIC | Age: 30
Discharge: HOME | End: 2020-01-19
Attending: NURSE PRACTITIONER | Admitting: NURSE PRACTITIONER
Payer: COMMERCIAL

## 2020-01-19 ENCOUNTER — APPOINTMENT (OUTPATIENT)
Dept: ULTRASOUND IMAGING | Facility: CLINIC | Age: 30
End: 2020-01-19
Attending: NURSE PRACTITIONER
Payer: COMMERCIAL

## 2020-01-19 VITALS
DIASTOLIC BLOOD PRESSURE: 73 MMHG | OXYGEN SATURATION: 97 % | HEART RATE: 111 BPM | SYSTOLIC BLOOD PRESSURE: 110 MMHG | TEMPERATURE: 97.5 F | RESPIRATION RATE: 18 BRPM

## 2020-01-19 DIAGNOSIS — O41.8X10 SUBCHORIONIC HEMATOMA IN FIRST TRIMESTER, SINGLE OR UNSPECIFIED FETUS: ICD-10-CM

## 2020-01-19 DIAGNOSIS — D72.829 LEUKOCYTOSIS, UNSPECIFIED TYPE: ICD-10-CM

## 2020-01-19 DIAGNOSIS — O46.8X1 SUBCHORIONIC HEMATOMA IN FIRST TRIMESTER, SINGLE OR UNSPECIFIED FETUS: ICD-10-CM

## 2020-01-19 LAB
ALBUMIN SERPL-MCNC: 3.1 G/DL (ref 3.4–5)
ALBUMIN UR-MCNC: NEGATIVE MG/DL
ALP SERPL-CCNC: 59 U/L (ref 40–150)
ALT SERPL W P-5'-P-CCNC: 22 U/L (ref 0–50)
ANION GAP SERPL CALCULATED.3IONS-SCNC: 6 MMOL/L (ref 3–14)
APPEARANCE UR: CLEAR
AST SERPL W P-5'-P-CCNC: 17 U/L (ref 0–45)
B-HCG SERPL-ACNC: ABNORMAL IU/L (ref 0–5)
BACTERIA #/AREA URNS HPF: ABNORMAL /HPF
BASOPHILS # BLD AUTO: 0 10E9/L (ref 0–0.2)
BASOPHILS NFR BLD AUTO: 0.2 %
BILIRUB DIRECT SERPL-MCNC: <0.1 MG/DL (ref 0–0.2)
BILIRUB SERPL-MCNC: 0.2 MG/DL (ref 0.2–1.3)
BILIRUB UR QL STRIP: NEGATIVE
BUN SERPL-MCNC: 8 MG/DL (ref 7–30)
CALCIUM SERPL-MCNC: 8.5 MG/DL (ref 8.5–10.1)
CHLORIDE SERPL-SCNC: 107 MMOL/L (ref 94–109)
CO2 SERPL-SCNC: 26 MMOL/L (ref 20–32)
COLOR UR AUTO: ABNORMAL
CREAT SERPL-MCNC: 0.67 MG/DL (ref 0.52–1.04)
DIFFERENTIAL METHOD BLD: ABNORMAL
EOSINOPHIL NFR BLD AUTO: 0.2 %
ERYTHROCYTE [DISTWIDTH] IN BLOOD BY AUTOMATED COUNT: 12.4 % (ref 10–15)
GFR SERPL CREATININE-BSD FRML MDRD: >90 ML/MIN/{1.73_M2}
GLUCOSE SERPL-MCNC: 101 MG/DL (ref 70–99)
GLUCOSE UR STRIP-MCNC: NEGATIVE MG/DL
HCT VFR BLD AUTO: 45.3 % (ref 35–47)
HGB BLD-MCNC: 15.7 G/DL (ref 11.7–15.7)
HGB UR QL STRIP: ABNORMAL
IMM GRANULOCYTES # BLD: 0.1 10E9/L (ref 0–0.4)
IMM GRANULOCYTES NFR BLD: 0.4 %
KETONES UR STRIP-MCNC: NEGATIVE MG/DL
LEUKOCYTE ESTERASE UR QL STRIP: NEGATIVE
LYMPHOCYTES # BLD AUTO: 1.2 10E9/L (ref 0.8–5.3)
LYMPHOCYTES NFR BLD AUTO: 5.9 %
MCH RBC QN AUTO: 31.6 PG (ref 26.5–33)
MCHC RBC AUTO-ENTMCNC: 34.7 G/DL (ref 31.5–36.5)
MCV RBC AUTO: 91 FL (ref 78–100)
MONOCYTES # BLD AUTO: 0.9 10E9/L (ref 0–1.3)
MONOCYTES NFR BLD AUTO: 4.5 %
MUCOUS THREADS #/AREA URNS LPF: PRESENT /LPF
NEUTROPHILS # BLD AUTO: 17.9 10E9/L (ref 1.6–8.3)
NEUTROPHILS NFR BLD AUTO: 88.8 %
NITRATE UR QL: NEGATIVE
NRBC # BLD AUTO: 0 10*3/UL
NRBC BLD AUTO-RTO: 0 /100
PH UR STRIP: 6 PH (ref 5–7)
PLATELET # BLD AUTO: 256 10E9/L (ref 150–450)
POTASSIUM SERPL-SCNC: 4 MMOL/L (ref 3.4–5.3)
PROT SERPL-MCNC: 6.4 G/DL (ref 6.8–8.8)
RBC # BLD AUTO: 4.97 10E12/L (ref 3.8–5.2)
RBC #/AREA URNS AUTO: 0 /HPF (ref 0–2)
SODIUM SERPL-SCNC: 139 MMOL/L (ref 133–144)
SOURCE: ABNORMAL
SP GR UR STRIP: 1 (ref 1–1.03)
UROBILINOGEN UR STRIP-MCNC: 0 MG/DL (ref 0–2)
WBC # BLD AUTO: 20.1 10E9/L (ref 4–11)
WBC #/AREA URNS AUTO: <1 /HPF (ref 0–5)

## 2020-01-19 PROCEDURE — 84702 CHORIONIC GONADOTROPIN TEST: CPT | Performed by: NURSE PRACTITIONER

## 2020-01-19 PROCEDURE — 81001 URINALYSIS AUTO W/SCOPE: CPT | Performed by: NURSE PRACTITIONER

## 2020-01-19 PROCEDURE — 80076 HEPATIC FUNCTION PANEL: CPT | Performed by: NURSE PRACTITIONER

## 2020-01-19 PROCEDURE — 25000132 ZZH RX MED GY IP 250 OP 250 PS 637: Performed by: NURSE PRACTITIONER

## 2020-01-19 PROCEDURE — 99285 EMERGENCY DEPT VISIT HI MDM: CPT | Mod: Z6 | Performed by: NURSE PRACTITIONER

## 2020-01-19 PROCEDURE — 80048 BASIC METABOLIC PNL TOTAL CA: CPT | Performed by: NURSE PRACTITIONER

## 2020-01-19 PROCEDURE — 85025 COMPLETE CBC W/AUTO DIFF WBC: CPT | Performed by: NURSE PRACTITIONER

## 2020-01-19 PROCEDURE — 99284 EMERGENCY DEPT VISIT MOD MDM: CPT | Mod: 25 | Performed by: NURSE PRACTITIONER

## 2020-01-19 PROCEDURE — 76801 OB US < 14 WKS SINGLE FETUS: CPT

## 2020-01-19 PROCEDURE — 87086 URINE CULTURE/COLONY COUNT: CPT | Performed by: NURSE PRACTITIONER

## 2020-01-19 RX ORDER — ACETAMINOPHEN 325 MG/1
975 TABLET ORAL ONCE
Status: COMPLETED | OUTPATIENT
Start: 2020-01-19 | End: 2020-01-19

## 2020-01-19 RX ADMIN — ACETAMINOPHEN 975 MG: 325 TABLET, FILM COATED ORAL at 15:37

## 2020-01-19 NOTE — ED PROVIDER NOTES
"  History     Chief Complaint   Patient presents with     Vaginal Bleeding     HPI  Kassandra Mead is a 29 year old female who presents to the emergency room today with complaints of vaginal bleeding and passing of clots since last night.  Patient is 11 weeks pregnant, G4, P3, has had a history of a subchorionic hemorrhage with this pregnancy which according to last ultrasound last week had nearly resolved.  Patient reports pelvic cramping, she has not taken anything for her symptoms, nothing really makes her symptoms better or worse.  Patient has not had a bowel movement since Friday.  She denies any vomiting.  Patient denies any urinary symptoms.  Patient is A +.      Allergies:  No Known Allergies    Problem List:    Patient Active Problem List    Diagnosis Date Noted     Tobacco use disorder 01/07/2010     Priority: Medium        Past Medical History:    Past Medical History:   Diagnosis Date     NO ACTIVE PROBLEMS        Past Surgical History:    Past Surgical History:   Procedure Laterality Date     NO HISTORY OF SURGERY         Family History:    Family History   Problem Relation Age of Onset     Alopecia Mother      Cervical Cancer Mother      Asthma Father      No Known Problems Sister      Blood Disease Maternal Grandmother      No Known Problems Paternal Grandmother      Heart Disease Paternal Grandfather         \"valve problem\"     No Known Problems Daughter      Asthma Daughter         \"septic\"     No Known Problems Son        Social History:  Marital Status:  Single [1]  Social History     Tobacco Use     Smoking status: Current Every Day Smoker     Packs/day: 1.00     Years: 13.00     Pack years: 13.00     Start date: 12/5/2006     Smokeless tobacco: Never Used   Substance Use Topics     Alcohol use: Not Currently     Frequency: Never     Binge frequency: Never     Drug use: Not Currently        Medications:    azithromycin (ZITHROMAX) 250 MG tablet  ondansetron (ZOFRAN-ODT) 4 MG ODT tab  Prenatal " w/o A Vit-Fe Fum-FA (PRENATA PO)          Review of Systems   All other systems reviewed and are negative.      Physical Exam   BP: 110/73  Pulse: 111  Temp: 97.5  F (36.4  C)  Resp: 18  SpO2: 97 %      Physical Exam  Vitals signs reviewed.   Constitutional:       Appearance: Normal appearance.   HENT:      Head: Normocephalic.      Mouth/Throat:      Mouth: Mucous membranes are moist.   Eyes:      Extraocular Movements: Extraocular movements intact.   Neck:      Musculoskeletal: Normal range of motion.   Cardiovascular:      Rate and Rhythm: Normal rate.      Pulses: Normal pulses.   Pulmonary:      Effort: Pulmonary effort is normal.      Breath sounds: Normal breath sounds.   Abdominal:      General: Bowel sounds are normal.      Palpations: Abdomen is soft.      Tenderness: There is abdominal tenderness (generalized pelvic).   Genitourinary:     Comments: Blood in vault, no clots, no other abnormal findings.    Musculoskeletal: Normal range of motion.   Skin:     General: Skin is warm and dry.      Capillary Refill: Capillary refill takes less than 2 seconds.   Neurological:      General: No focal deficit present.      Mental Status: She is alert.   Psychiatric:         Mood and Affect: Mood normal.         ED Course        Procedures    Results for orders placed or performed during the hospital encounter of 01/19/20 (from the past 24 hour(s))   CBC with platelets differential   Result Value Ref Range    WBC 20.1 (H) 4.0 - 11.0 10e9/L    RBC Count 4.97 3.8 - 5.2 10e12/L    Hemoglobin 15.7 11.7 - 15.7 g/dL    Hematocrit 45.3 35.0 - 47.0 %    MCV 91 78 - 100 fl    MCH 31.6 26.5 - 33.0 pg    MCHC 34.7 31.5 - 36.5 g/dL    RDW 12.4 10.0 - 15.0 %    Platelet Count 256 150 - 450 10e9/L    Diff Method Automated Method     % Neutrophils 88.8 %    % Lymphocytes 5.9 %    % Monocytes 4.5 %    % Eosinophils 0.2 %    % Basophils 0.2 %    % Immature Granulocytes 0.4 %    Nucleated RBCs 0 0 /100    Absolute Neutrophil 17.9 (H)  1.6 - 8.3 10e9/L    Absolute Lymphocytes 1.2 0.8 - 5.3 10e9/L    Absolute Monocytes 0.9 0.0 - 1.3 10e9/L    Absolute Basophils 0.0 0.0 - 0.2 10e9/L    Abs Immature Granulocytes 0.1 0 - 0.4 10e9/L    Absolute Nucleated RBC 0.0    Basic metabolic panel   Result Value Ref Range    Sodium 139 133 - 144 mmol/L    Potassium 4.0 3.4 - 5.3 mmol/L    Chloride 107 94 - 109 mmol/L    Carbon Dioxide 26 20 - 32 mmol/L    Anion Gap 6 3 - 14 mmol/L    Glucose 101 (H) 70 - 99 mg/dL    Urea Nitrogen 8 7 - 30 mg/dL    Creatinine 0.67 0.52 - 1.04 mg/dL    GFR Estimate >90 >60 mL/min/[1.73_m2]    GFR Estimate If Black >90 >60 mL/min/[1.73_m2]    Calcium 8.5 8.5 - 10.1 mg/dL   UA with Microscopic   Result Value Ref Range    Color Urine Straw     Appearance Urine Clear     Glucose Urine Negative NEG^Negative mg/dL    Bilirubin Urine Negative NEG^Negative    Ketones Urine Negative NEG^Negative mg/dL    Specific Gravity Urine 1.002 (L) 1.003 - 1.035    Blood Urine Moderate (A) NEG^Negative    pH Urine 6.0 5.0 - 7.0 pH    Protein Albumin Urine Negative NEG^Negative mg/dL    Urobilinogen mg/dL 0.0 0.0 - 2.0 mg/dL    Nitrite Urine Negative NEG^Negative    Leukocyte Esterase Urine Negative NEG^Negative    Source Midstream Urine     WBC Urine <1 0 - 5 /HPF    RBC Urine 0 0 - 2 /HPF    Bacteria Urine Few (A) NEG^Negative /HPF    Mucous Urine Present (A) NEG^Negative /LPF   HCG quantitative pregnancy   Result Value Ref Range    HCG Quantitative Serum 36,870 (H) 0 - 5 IU/L   Hepatic panel   Result Value Ref Range    Bilirubin Direct <0.1 0.0 - 0.2 mg/dL    Bilirubin Total 0.2 0.2 - 1.3 mg/dL    Albumin 3.1 (L) 3.4 - 5.0 g/dL    Protein Total 6.4 (L) 6.8 - 8.8 g/dL    Alkaline Phosphatase 59 40 - 150 U/L    ALT 22 0 - 50 U/L    AST 17 0 - 45 U/L   US OB < 14 Weeks Single    Narrative    US OB LESS THAN 14 WEEKS SINGLE 1/19/2020 4:42 PM     HISTORY: Bleeding, 11 weeks.    TECHNIQUE: Endovaginal sonography was added to the  transabdominal  scans.    COMPARISON:  2020.    FINDINGS:      Estimated gestational age by current ultrasound measurement: 11 weeks  3 days.  Estimated date of delivery based on this ultrasound: 2020.  Crown-rump length: 4.5 cm.   Embryonic cardiac activity: 185 bpm.   Yolk sac: Not seen.  Gestational sac: Normal shape.  Subchorionic hemorrhage: 2.3 x 3.2 x 1.3 cm, increased from 1.5 x 0.7  x 0.5 cm on 2020.    Right ovary: 2.2 cm corpus luteal cyst.  Left ovary: Unremarkable.  Adnexal mass: None.  Free pelvic fluid: None.      Impression    IMPRESSION:   1. Single live intrauterine gestation measuring 11 weeks 3 days.  2. Subchorionic hemorrhage, increased in size since 2020.     GEN WILLS MD       Medications   acetaminophen (TYLENOL) tablet 975 mg (975 mg Oral Given 20 1537)       Assessments & Plan (with Medical Decision Making)  Kassandra is a 29-year-old female, , presents with increase in vaginal bleeding.  Please refer to HPI and focused exam.  Patient does have history of subchorionic hemorrhage that according to last ultrasound looked like it was improving, today's ultrasound shows an increase in size from 1.5 x 0.7 x 0 0.5 to 2.3 x 3.2 x 1.3 cm.  Single live IUP measuring 11 weeks and 3 days on ultrasound today.  Pelvic exam as noted above revealed blood in vaginal vault, unable to visualize cervical os secondary to vaginal discharge.  CBC returns with a white count of 20,000, hemoglobin stable at 15.7.  CMP is within normal limits.  UA is negative for infection.  Patient has no focal tenderness on exam.  I did discuss patient's white count with Dr. Diehl who is on-call for OB today.  Given patient's lack of focal findings that would be concerning for infection and remaining work-up that is reassuring, it was felt patient can follow-up with her primary care provider this next week and have a repeat blood test done which I feel is reasonable.  Certainly there is a low  threshold for her return if she does develop worsening or concerning symptoms.  I discussed today's work-up and recommendations with patient in detail.  There was a work and message left to have her OB appointment scheduled by Wednesday this week.  I did order a CBC to be drawn and lab prior to her appointment.  Patient is agreeable to plan of care and discharged in stable condition.     I have reviewed the nursing notes.    I have reviewed the findings, diagnosis, plan and need for follow up with the patient.    New Prescriptions    No medications on file       Final diagnoses:   Leukocytosis, unspecified type   Subchorionic hematoma in first trimester, single or unspecified fetus       1/19/2020   [facility] EMERGENCY DEPARTMENT     Nohemi Dorantes, ALLIE CNP  01/19/20 0950

## 2020-01-19 NOTE — ED TRIAGE NOTES
"Patient is approx. 11 weeks pregnant. Has known \"tears,\" but had an ultrasound Monday that looked good. Patient complains of lower back pain and lower abdominal pain that started at 1000 today. Noted dark discharge overnight and clots this am.   "

## 2020-01-19 NOTE — ED AVS SNAPSHOT
[facility] Emergency Department  [address] [place] [address]  Phone:  [phone]  Fax:  [phone]                                    Kassandra Mead   MRN: [id number]    Department:  [facility] Emergency Department   Date of Visit:  1/19/2020           After Visit Summary Signature Page    I have received my discharge instructions, and my questions have been answered. I have discussed any challenges I see with this plan with the nurse or doctor.    ..........................................................................................................................................  Patient/Patient Representative Signature      ..........................................................................................................................................  Patient Representative Print Name and Relationship to Patient    ..................................................               ................................................  Date                                   Time    ..........................................................................................................................................  Reviewed by Signature/Title    ...................................................              ..............................................  Date                                               Time          22EPIC Rev 08/18

## 2020-01-19 NOTE — DISCHARGE INSTRUCTIONS
Stop at lab prior to appointment to have your blood re-drawn.  If you do not hear from our specialty clinic by Tuesday morning please call them at [phone]

## 2020-01-20 ENCOUNTER — APPOINTMENT (OUTPATIENT)
Dept: ULTRASOUND IMAGING | Facility: CLINIC | Age: 30
End: 2020-01-20
Attending: FAMILY MEDICINE
Payer: COMMERCIAL

## 2020-01-20 ENCOUNTER — HOSPITAL ENCOUNTER (EMERGENCY)
Facility: CLINIC | Age: 30
Discharge: HOME | End: 2020-01-20
Attending: FAMILY MEDICINE | Admitting: FAMILY MEDICINE
Payer: COMMERCIAL

## 2020-01-20 ENCOUNTER — HOSPITAL ENCOUNTER (OUTPATIENT)
Facility: CLINIC | Age: 30
Discharge: HOME | End: 2020-01-21
Attending: PHYSICIAN ASSISTANT | Admitting: OBSTETRICS & GYNECOLOGY
Payer: COMMERCIAL

## 2020-01-20 ENCOUNTER — PREP FOR PROCEDURE (OUTPATIENT)
Dept: OBGYN | Facility: CLINIC | Age: 30
End: 2020-01-20

## 2020-01-20 ENCOUNTER — ANESTHESIA (OUTPATIENT)
Dept: SURGERY | Facility: CLINIC | Age: 30
End: 2020-01-20
Payer: COMMERCIAL

## 2020-01-20 ENCOUNTER — TELEPHONE (OUTPATIENT)
Dept: FAMILY MEDICINE | Facility: CLINIC | Age: 30
End: 2020-01-20

## 2020-01-20 ENCOUNTER — APPOINTMENT (OUTPATIENT)
Dept: ULTRASOUND IMAGING | Facility: CLINIC | Age: 30
End: 2020-01-20
Attending: OBSTETRICS & GYNECOLOGY
Payer: COMMERCIAL

## 2020-01-20 ENCOUNTER — ANESTHESIA EVENT (OUTPATIENT)
Dept: SURGERY | Facility: CLINIC | Age: 30
End: 2020-01-20
Payer: COMMERCIAL

## 2020-01-20 VITALS
OXYGEN SATURATION: 96 % | TEMPERATURE: 99.4 F | HEART RATE: 74 BPM | DIASTOLIC BLOOD PRESSURE: 61 MMHG | SYSTOLIC BLOOD PRESSURE: 107 MMHG | RESPIRATION RATE: 16 BRPM

## 2020-01-20 DIAGNOSIS — O03.9 MISCARRIAGE: ICD-10-CM

## 2020-01-20 DIAGNOSIS — O99.891 PREGNANCY COMPLICATION BEFORE BIRTH: ICD-10-CM

## 2020-01-20 DIAGNOSIS — O03.1 INCOMPLETE ABORTION WITH DELAYED OR EXCESSIVE HEMORRHAGE: Primary | ICD-10-CM

## 2020-01-20 DIAGNOSIS — R10.31 RLQ ABDOMINAL PAIN: ICD-10-CM

## 2020-01-20 DIAGNOSIS — N93.9 VAGINAL HEMORRHAGE: ICD-10-CM

## 2020-01-20 DIAGNOSIS — O03.1 INCOMPLETE ABORTION WITH DELAYED OR EXCESSIVE HEMORRHAGE: ICD-10-CM

## 2020-01-20 LAB
ANION GAP SERPL CALCULATED.3IONS-SCNC: 8 MMOL/L (ref 3–14)
B-HCG SERPL-ACNC: ABNORMAL IU/L (ref 0–5)
BACTERIA SPEC CULT: NO GROWTH
BASOPHILS # BLD AUTO: 0 10E9/L (ref 0–0.2)
BASOPHILS # BLD AUTO: 0 10E9/L (ref 0–0.2)
BASOPHILS NFR BLD AUTO: 0.2 %
BASOPHILS NFR BLD AUTO: 0.2 %
BLD PROD TYP BPU: NORMAL
BLD UNIT ID BPU: 0
BLOOD PRODUCT CODE: NORMAL
BPU ID: NORMAL
BUN SERPL-MCNC: 7 MG/DL (ref 7–30)
CALCIUM SERPL-MCNC: 8 MG/DL (ref 8.5–10.1)
CHLORIDE SERPL-SCNC: 105 MMOL/L (ref 94–109)
CO2 SERPL-SCNC: 24 MMOL/L (ref 20–32)
CREAT SERPL-MCNC: 0.71 MG/DL (ref 0.52–1.04)
DIFFERENTIAL METHOD BLD: ABNORMAL
DIFFERENTIAL METHOD BLD: ABNORMAL
EOSINOPHIL NFR BLD AUTO: 0.2 %
EOSINOPHIL NFR BLD AUTO: 0.3 %
ERYTHROCYTE [DISTWIDTH] IN BLOOD BY AUTOMATED COUNT: 12.4 % (ref 10–15)
ERYTHROCYTE [DISTWIDTH] IN BLOOD BY AUTOMATED COUNT: 12.5 % (ref 10–15)
GFR SERPL CREATININE-BSD FRML MDRD: >90 ML/MIN/{1.73_M2}
GLUCOSE SERPL-MCNC: 94 MG/DL (ref 70–99)
HCT VFR BLD AUTO: 39.1 % (ref 35–47)
HCT VFR BLD AUTO: 43.7 % (ref 35–47)
HGB BLD-MCNC: 13.6 G/DL (ref 11.7–15.7)
HGB BLD-MCNC: 15.3 G/DL (ref 11.7–15.7)
IMM GRANULOCYTES # BLD: 0.1 10E9/L (ref 0–0.4)
IMM GRANULOCYTES # BLD: 0.1 10E9/L (ref 0–0.4)
IMM GRANULOCYTES NFR BLD: 0.3 %
IMM GRANULOCYTES NFR BLD: 0.5 %
LYMPHOCYTES # BLD AUTO: 1.2 10E9/L (ref 0.8–5.3)
LYMPHOCYTES # BLD AUTO: 1.5 10E9/L (ref 0.8–5.3)
LYMPHOCYTES NFR BLD AUTO: 6.7 %
LYMPHOCYTES NFR BLD AUTO: 7.9 %
Lab: NORMAL
MCH RBC QN AUTO: 31.9 PG (ref 26.5–33)
MCH RBC QN AUTO: 31.9 PG (ref 26.5–33)
MCHC RBC AUTO-ENTMCNC: 34.8 G/DL (ref 31.5–36.5)
MCHC RBC AUTO-ENTMCNC: 35 G/DL (ref 31.5–36.5)
MCV RBC AUTO: 91 FL (ref 78–100)
MCV RBC AUTO: 92 FL (ref 78–100)
MONOCYTES # BLD AUTO: 0.9 10E9/L (ref 0–1.3)
MONOCYTES # BLD AUTO: 0.9 10E9/L (ref 0–1.3)
MONOCYTES NFR BLD AUTO: 4.9 %
MONOCYTES NFR BLD AUTO: 4.9 %
NEUTROPHILS # BLD AUTO: 15.6 10E9/L (ref 1.6–8.3)
NEUTROPHILS # BLD AUTO: 15.7 10E9/L (ref 1.6–8.3)
NEUTROPHILS NFR BLD AUTO: 86.2 %
NEUTROPHILS NFR BLD AUTO: 87.7 %
NRBC # BLD AUTO: 0 10*3/UL
NRBC # BLD AUTO: 0 10*3/UL
NRBC BLD AUTO-RTO: 0 /100
NRBC BLD AUTO-RTO: 0 /100
PLATELET # BLD AUTO: 223 10E9/L (ref 150–450)
PLATELET # BLD AUTO: 235 10E9/L (ref 150–450)
POTASSIUM SERPL-SCNC: 3.6 MMOL/L (ref 3.4–5.3)
RBC # BLD AUTO: 4.27 10E12/L (ref 3.8–5.2)
RBC # BLD AUTO: 4.8 10E12/L (ref 3.8–5.2)
SODIUM SERPL-SCNC: 137 MMOL/L (ref 133–144)
SPECIMEN SOURCE: NORMAL
TRANSFUSION STATUS PATIENT QL: NORMAL
TRANSFUSION STATUS PATIENT QL: NORMAL
WBC # BLD AUTO: 17.9 10E9/L (ref 4–11)
WBC # BLD AUTO: 18.3 10E9/L (ref 4–11)

## 2020-01-20 PROCEDURE — 25000128 H RX IP 250 OP 636: Performed by: OBSTETRICS & GYNECOLOGY

## 2020-01-20 PROCEDURE — 71000027 ZZH RECOVERY PHASE 2 EACH 15 MINS: Performed by: OBSTETRICS & GYNECOLOGY

## 2020-01-20 PROCEDURE — 76705 ECHO EXAM OF ABDOMEN: CPT

## 2020-01-20 PROCEDURE — 85025 COMPLETE CBC W/AUTO DIFF WBC: CPT | Performed by: FAMILY MEDICINE

## 2020-01-20 PROCEDURE — 85025 COMPLETE CBC W/AUTO DIFF WBC: CPT | Performed by: PHYSICIAN ASSISTANT

## 2020-01-20 PROCEDURE — 86900 BLOOD TYPING SEROLOGIC ABO: CPT | Performed by: PHYSICIAN ASSISTANT

## 2020-01-20 PROCEDURE — 99285 EMERGENCY DEPT VISIT HI MDM: CPT | Mod: 25 | Performed by: FAMILY MEDICINE

## 2020-01-20 PROCEDURE — 27210794 ZZH OR GENERAL SUPPLY STERILE: Performed by: OBSTETRICS & GYNECOLOGY

## 2020-01-20 PROCEDURE — 36000050 ZZH SURGERY LEVEL 2 1ST 30 MIN: Performed by: OBSTETRICS & GYNECOLOGY

## 2020-01-20 PROCEDURE — 99284 EMERGENCY DEPT VISIT MOD MDM: CPT | Mod: 25 | Performed by: FAMILY MEDICINE

## 2020-01-20 PROCEDURE — 25000128 H RX IP 250 OP 636: Performed by: PHYSICIAN ASSISTANT

## 2020-01-20 PROCEDURE — 25000125 ZZHC RX 250: Performed by: NURSE ANESTHETIST, CERTIFIED REGISTERED

## 2020-01-20 PROCEDURE — 36000052 ZZH SURGERY LEVEL 2 EA 15 ADDTL MIN: Performed by: OBSTETRICS & GYNECOLOGY

## 2020-01-20 PROCEDURE — 37000009 ZZH ANESTHESIA TECHNICAL FEE, EACH ADDTL 15 MIN: Performed by: OBSTETRICS & GYNECOLOGY

## 2020-01-20 PROCEDURE — 25000132 ZZH RX MED GY IP 250 OP 250 PS 637: Performed by: PHYSICIAN ASSISTANT

## 2020-01-20 PROCEDURE — 80048 BASIC METABOLIC PNL TOTAL CA: CPT | Performed by: PHYSICIAN ASSISTANT

## 2020-01-20 PROCEDURE — P9016 RBC LEUKOCYTES REDUCED: HCPCS | Performed by: PHYSICIAN ASSISTANT

## 2020-01-20 PROCEDURE — 84702 CHORIONIC GONADOTROPIN TEST: CPT | Performed by: PHYSICIAN ASSISTANT

## 2020-01-20 PROCEDURE — 36430 TRANSFUSION BLD/BLD COMPNT: CPT | Performed by: FAMILY MEDICINE

## 2020-01-20 PROCEDURE — 96374 THER/PROPH/DIAG INJ IV PUSH: CPT | Mod: 59 | Performed by: FAMILY MEDICINE

## 2020-01-20 PROCEDURE — 37000008 ZZH ANESTHESIA TECHNICAL FEE, 1ST 30 MIN: Performed by: OBSTETRICS & GYNECOLOGY

## 2020-01-20 PROCEDURE — 76801 OB US < 14 WKS SINGLE FETUS: CPT

## 2020-01-20 PROCEDURE — 99284 EMERGENCY DEPT VISIT MOD MDM: CPT | Mod: Z6 | Performed by: FAMILY MEDICINE

## 2020-01-20 PROCEDURE — 25000125 ZZHC RX 250: Performed by: PHYSICIAN ASSISTANT

## 2020-01-20 PROCEDURE — 25000128 H RX IP 250 OP 636: Performed by: NURSE ANESTHETIST, CERTIFIED REGISTERED

## 2020-01-20 PROCEDURE — 86850 RBC ANTIBODY SCREEN: CPT | Performed by: PHYSICIAN ASSISTANT

## 2020-01-20 PROCEDURE — 25800030 ZZH RX IP 258 OP 636: Performed by: NURSE ANESTHETIST, CERTIFIED REGISTERED

## 2020-01-20 PROCEDURE — 99291 CRITICAL CARE FIRST HOUR: CPT | Mod: Z6 | Performed by: FAMILY MEDICINE

## 2020-01-20 PROCEDURE — 86901 BLOOD TYPING SEROLOGIC RH(D): CPT | Performed by: PHYSICIAN ASSISTANT

## 2020-01-20 PROCEDURE — 86923 COMPATIBILITY TEST ELECTRIC: CPT | Performed by: PHYSICIAN ASSISTANT

## 2020-01-20 PROCEDURE — 25800030 ZZH RX IP 258 OP 636: Performed by: PHYSICIAN ASSISTANT

## 2020-01-20 PROCEDURE — 25000132 ZZH RX MED GY IP 250 OP 250 PS 637: Performed by: FAMILY MEDICINE

## 2020-01-20 PROCEDURE — 96361 HYDRATE IV INFUSION ADD-ON: CPT | Mod: 59 | Performed by: FAMILY MEDICINE

## 2020-01-20 PROCEDURE — 25000125 ZZHC RX 250: Performed by: OBSTETRICS & GYNECOLOGY

## 2020-01-20 RX ORDER — ACETAMINOPHEN 500 MG
500-1000 TABLET ORAL EVERY 6 HOURS PRN
COMMUNITY
End: 2021-03-08

## 2020-01-20 RX ORDER — FENTANYL CITRATE 50 UG/ML
INJECTION, SOLUTION INTRAMUSCULAR; INTRAVENOUS PRN
Status: DISCONTINUED | OUTPATIENT
Start: 2020-01-20 | End: 2020-01-21

## 2020-01-20 RX ORDER — SODIUM CHLORIDE 9 MG/ML
1000 INJECTION, SOLUTION INTRAVENOUS CONTINUOUS
Status: DISCONTINUED | OUTPATIENT
Start: 2020-01-20 | End: 2020-01-21 | Stop reason: HOSPADM

## 2020-01-20 RX ORDER — ONDANSETRON 2 MG/ML
INJECTION INTRAMUSCULAR; INTRAVENOUS PRN
Status: DISCONTINUED | OUTPATIENT
Start: 2020-01-20 | End: 2020-01-21

## 2020-01-20 RX ORDER — ACETAMINOPHEN 325 MG/1
975 TABLET ORAL ONCE
Status: COMPLETED | OUTPATIENT
Start: 2020-01-20 | End: 2020-01-20

## 2020-01-20 RX ORDER — PROPOFOL 10 MG/ML
INJECTION, EMULSION INTRAVENOUS PRN
Status: DISCONTINUED | OUTPATIENT
Start: 2020-01-20 | End: 2020-01-21

## 2020-01-20 RX ORDER — HYDROMORPHONE HYDROCHLORIDE 1 MG/ML
0.5 INJECTION, SOLUTION INTRAMUSCULAR; INTRAVENOUS; SUBCUTANEOUS ONCE
Status: COMPLETED | OUTPATIENT
Start: 2020-01-20 | End: 2020-01-20

## 2020-01-20 RX ORDER — AMOXICILLIN 250 MG
1-2 CAPSULE ORAL 2 TIMES DAILY
Qty: 30 TABLET | Refills: 0 | Status: SHIPPED | OUTPATIENT
Start: 2020-01-20 | End: 2020-02-18

## 2020-01-20 RX ORDER — ACETAMINOPHEN 325 MG/1
650 TABLET ORAL EVERY 4 HOURS PRN
Qty: 50 TABLET | Refills: 0 | Status: SHIPPED | OUTPATIENT
Start: 2020-01-20 | End: 2021-03-08

## 2020-01-20 RX ORDER — PROPOFOL 10 MG/ML
INJECTION, EMULSION INTRAVENOUS CONTINUOUS PRN
Status: DISCONTINUED | OUTPATIENT
Start: 2020-01-20 | End: 2020-01-21

## 2020-01-20 RX ORDER — SODIUM CHLORIDE, SODIUM LACTATE, POTASSIUM CHLORIDE, CALCIUM CHLORIDE 600; 310; 30; 20 MG/100ML; MG/100ML; MG/100ML; MG/100ML
INJECTION, SOLUTION INTRAVENOUS CONTINUOUS PRN
Status: DISCONTINUED | OUTPATIENT
Start: 2020-01-20 | End: 2020-01-21

## 2020-01-20 RX ORDER — LIDOCAINE HYDROCHLORIDE 20 MG/ML
INJECTION, SOLUTION INFILTRATION; PERINEURAL PRN
Status: DISCONTINUED | OUTPATIENT
Start: 2020-01-20 | End: 2020-01-21

## 2020-01-20 RX ORDER — DOXYCYCLINE 100 MG/10ML
100 INJECTION, POWDER, LYOPHILIZED, FOR SOLUTION INTRAVENOUS
Status: COMPLETED | OUTPATIENT
Start: 2020-01-20 | End: 2020-01-20

## 2020-01-20 RX ORDER — OXYCODONE HYDROCHLORIDE 5 MG/1
5 TABLET ORAL EVERY 6 HOURS PRN
Qty: 5 TABLET | Refills: 0 | Status: ON HOLD | OUTPATIENT
Start: 2020-01-20 | End: 2020-01-20

## 2020-01-20 RX ADMIN — FENTANYL CITRATE 50 MCG: 50 INJECTION, SOLUTION INTRAMUSCULAR; INTRAVENOUS at 23:21

## 2020-01-20 RX ADMIN — MISOPROSTOL 800 MCG: 200 TABLET ORAL at 21:12

## 2020-01-20 RX ADMIN — PROPOFOL 100 MCG/KG/MIN: 10 INJECTION, EMULSION INTRAVENOUS at 23:18

## 2020-01-20 RX ADMIN — MIDAZOLAM 1 MG: 1 INJECTION INTRAMUSCULAR; INTRAVENOUS at 23:16

## 2020-01-20 RX ADMIN — ONDANSETRON 4 MG: 2 INJECTION INTRAMUSCULAR; INTRAVENOUS at 23:31

## 2020-01-20 RX ADMIN — MIDAZOLAM 1 MG: 1 INJECTION INTRAMUSCULAR; INTRAVENOUS at 23:12

## 2020-01-20 RX ADMIN — PROPOFOL 20 MG: 10 INJECTION, EMULSION INTRAVENOUS at 23:20

## 2020-01-20 RX ADMIN — FENTANYL CITRATE 50 MCG: 50 INJECTION, SOLUTION INTRAMUSCULAR; INTRAVENOUS at 23:16

## 2020-01-20 RX ADMIN — SODIUM CHLORIDE 1000 ML: 9 INJECTION, SOLUTION INTRAVENOUS at 21:45

## 2020-01-20 RX ADMIN — SODIUM CHLORIDE 1000 ML: 9 INJECTION, SOLUTION INTRAVENOUS at 20:49

## 2020-01-20 RX ADMIN — HYDROMORPHONE HYDROCHLORIDE 0.5 MG: 1 INJECTION, SOLUTION INTRAMUSCULAR; INTRAVENOUS; SUBCUTANEOUS at 21:57

## 2020-01-20 RX ADMIN — DOXYCYCLINE 100 MG: 100 INJECTION, POWDER, LYOPHILIZED, FOR SOLUTION INTRAVENOUS at 22:16

## 2020-01-20 RX ADMIN — ACETAMINOPHEN 975 MG: 325 TABLET, FILM COATED ORAL at 02:05

## 2020-01-20 RX ADMIN — LIDOCAINE HYDROCHLORIDE 60 MG: 20 INJECTION, SOLUTION INFILTRATION; PERINEURAL at 23:18

## 2020-01-20 RX ADMIN — SODIUM CHLORIDE, POTASSIUM CHLORIDE, SODIUM LACTATE AND CALCIUM CHLORIDE: 600; 310; 30; 20 INJECTION, SOLUTION INTRAVENOUS at 23:12

## 2020-01-20 ASSESSMENT — ENCOUNTER SYMPTOMS
APPETITE CHANGE: 1
ABDOMINAL PAIN: 1
VOMITING: 0
DYSURIA: 0
NAUSEA: 1
NEUROLOGICAL NEGATIVE: 1
BLOOD IN STOOL: 0
CHILLS: 0
MUSCULOSKELETAL NEGATIVE: 1
EYES NEGATIVE: 1
CARDIOVASCULAR NEGATIVE: 1
RESPIRATORY NEGATIVE: 1
ACTIVITY CHANGE: 1
PSYCHIATRIC NEGATIVE: 1
FEVER: 0
CONSTIPATION: 0

## 2020-01-20 ASSESSMENT — LIFESTYLE VARIABLES: TOBACCO_USE: 1

## 2020-01-20 NOTE — ED TRIAGE NOTES
Pt presents with concerns of right lower abdominal pain and right flank/back pain.  Pt states that the vaginal bleeding has improved since she was here at 1800.  Pt took Tylenol at 1800. Pt is 11 weeks pregnant.

## 2020-01-20 NOTE — ED AVS SNAPSHOT
[facility] Emergency Department  [address] [place] [address]  Phone:  [phone]  Fax:  [phone]                                    Kassandra Mead   MRN: [id number]    Department:  [facility] Emergency Department   Date of Visit:  1/20/2020           After Visit Summary Signature Page    I have received my discharge instructions, and my questions have been answered. I have discussed any challenges I see with this plan with the nurse or doctor.    ..........................................................................................................................................  Patient/Patient Representative Signature      ..........................................................................................................................................  Patient Representative Print Name and Relationship to Patient    ..................................................               ................................................  Date                                   Time    ..........................................................................................................................................  Reviewed by Signature/Title    ...................................................              ..............................................  Date                                               Time          22EPIC Rev 08/18

## 2020-01-20 NOTE — DISCHARGE INSTRUCTIONS
Please read and follow the handout(s) instructions. Return, if needed, for increased or worsening symptoms and as directed by the handout(s).    Do not drive if using the oxycodone medication.

## 2020-01-20 NOTE — ED PROVIDER NOTES
History     Chief Complaint   Patient presents with     Abdominal Pain     HPI  Kassandra Mead is a 29 year old female who was seen in this emergency room 6 hours ago return to the ER concerned about continued and increasing intensity of right lower quadrant abdominal pain symptoms.  Patient is pregnant at approximately 11 weeks gestational age.  She is  4 para 3-0-0-3.  She states that she was seen here in the emergency room second to some vaginal bleeding issues and some lower abdominal cramping pains this last evening.  She was seen by Allison Dorantes and an ultrasound was done.  Patient states that the vaginal bleeding has since slowed and she is not really concerned about that at this time but has had continued and increasing amounts of pain to the right lower abdomen.  She stated she was told by Allison Dorantes to return to the ER should she have increasing symptoms because she also had an elevated white blood cell count.  Patient stated that she was given some Tylenol for pain about 6:00 this evening and that did help the pain significantly but that is since worn off and the pain is now intense again.  She denies fever or chills symptoms.  She does admit to decreased oral intake today stating she does not feel like eating.  She does have nausea but has had nausea throughout her pregnancy and is using Zofran to control it.  She has not had any rectal bleeding that she is aware of.    Birth Date: [birth year] Age (as of 20): 29 Ethnicity: American Race: White   History:  Estimated Date of Delivery: 20 Gestational Age: 10w6d Blood Type: A Pos     I reviewed her ER stay and exam findings from yesterday's visit.  I copied an excerpt from the provider note below that includes her lab test results as well as the ultrasound result findings:    Procedures           Results for orders placed or performed during the hospital encounter of 20 (from the past 24 hour(s))   CBC with platelets  differential   Result Value Ref Range     WBC 20.1 (H) 4.0 - 11.0 10e9/L     RBC Count 4.97 3.8 - 5.2 10e12/L     Hemoglobin 15.7 11.7 - 15.7 g/dL     Hematocrit 45.3 35.0 - 47.0 %     MCV 91 78 - 100 fl     MCH 31.6 26.5 - 33.0 pg     MCHC 34.7 31.5 - 36.5 g/dL     RDW 12.4 10.0 - 15.0 %     Platelet Count 256 150 - 450 10e9/L     Diff Method Automated Method       % Neutrophils 88.8 %     % Lymphocytes 5.9 %     % Monocytes 4.5 %     % Eosinophils 0.2 %     % Basophils 0.2 %     % Immature Granulocytes 0.4 %     Nucleated RBCs 0 0 /100     Absolute Neutrophil 17.9 (H) 1.6 - 8.3 10e9/L     Absolute Lymphocytes 1.2 0.8 - 5.3 10e9/L     Absolute Monocytes 0.9 0.0 - 1.3 10e9/L     Absolute Basophils 0.0 0.0 - 0.2 10e9/L     Abs Immature Granulocytes 0.1 0 - 0.4 10e9/L     Absolute Nucleated RBC 0.0     Basic metabolic panel   Result Value Ref Range     Sodium 139 133 - 144 mmol/L     Potassium 4.0 3.4 - 5.3 mmol/L     Chloride 107 94 - 109 mmol/L     Carbon Dioxide 26 20 - 32 mmol/L     Anion Gap 6 3 - 14 mmol/L     Glucose 101 (H) 70 - 99 mg/dL     Urea Nitrogen 8 7 - 30 mg/dL     Creatinine 0.67 0.52 - 1.04 mg/dL     GFR Estimate >90 >60 mL/min/[1.73_m2]     GFR Estimate If Black >90 >60 mL/min/[1.73_m2]     Calcium 8.5 8.5 - 10.1 mg/dL   UA with Microscopic   Result Value Ref Range     Color Urine Straw       Appearance Urine Clear       Glucose Urine Negative NEG^Negative mg/dL     Bilirubin Urine Negative NEG^Negative     Ketones Urine Negative NEG^Negative mg/dL     Specific Gravity Urine 1.002 (L) 1.003 - 1.035     Blood Urine Moderate (A) NEG^Negative     pH Urine 6.0 5.0 - 7.0 pH     Protein Albumin Urine Negative NEG^Negative mg/dL     Urobilinogen mg/dL 0.0 0.0 - 2.0 mg/dL     Nitrite Urine Negative NEG^Negative     Leukocyte Esterase Urine Negative NEG^Negative     Source Midstream Urine       WBC Urine <1 0 - 5 /HPF     RBC Urine 0 0 - 2 /HPF     Bacteria Urine Few (A) NEG^Negative /HPF     Mucous Urine  Present (A) NEG^Negative /LPF   HCG quantitative pregnancy   Result Value Ref Range     HCG Quantitative Serum 36,870 (H) 0 - 5 IU/L   Hepatic panel   Result Value Ref Range     Bilirubin Direct <0.1 0.0 - 0.2 mg/dL     Bilirubin Total 0.2 0.2 - 1.3 mg/dL     Albumin 3.1 (L) 3.4 - 5.0 g/dL     Protein Total 6.4 (L) 6.8 - 8.8 g/dL     Alkaline Phosphatase 59 40 - 150 U/L     ALT 22 0 - 50 U/L     AST 17 0 - 45 U/L   US OB < 14 Weeks Single     Narrative     US OB LESS THAN 14 WEEKS SINGLE 2020 4:42 PM      HISTORY: Bleeding, 11 weeks.     TECHNIQUE: Endovaginal sonography was added to the transabdominal  scans.     COMPARISON:  2020.     FINDINGS:       Estimated gestational age by current ultrasound measurement: 11 weeks  3 days.  Estimated date of delivery based on this ultrasound: 2020.  Crown-rump length: 4.5 cm.   Embryonic cardiac activity: 185 bpm.   Yolk sac: Not seen.  Gestational sac: Normal shape.  Subchorionic hemorrhage: 2.3 x 3.2 x 1.3 cm, increased from 1.5 x 0.7  x 0.5 cm on 2020.     Right ovary: 2.2 cm corpus luteal cyst.  Left ovary: Unremarkable.  Adnexal mass: None.  Free pelvic fluid: None.        Impression     IMPRESSION:   1. Single live intrauterine gestation measuring 11 weeks 3 days.  2. Subchorionic hemorrhage, increased in size since 2020.      GEN WILLS MD         Medications   acetaminophen (TYLENOL) tablet 975 mg (975 mg Oral Given 20 1537)         Assessments & Plan (with Medical Decision Making)  Kassandra is a 29-year-old female, , presents with increase in vaginal bleeding.  Please refer to HPI and focused exam.  Patient does have history of subchorionic hemorrhage that according to last ultrasound looked like it was improving, today's ultrasound shows an increase in size from 1.5 x 0.7 x 0 0.5 to 2.3 x 3.2 x 1.3 cm.  Single live IUP measuring 11 weeks and 3 days on ultrasound today.  Pelvic exam as noted above revealed blood in vaginal vault,  "unable to visualize cervical os secondary to vaginal discharge.  CBC returns with a white count of 20,000, hemoglobin stable at 15.7.  CMP is within normal limits.  UA is negative for infection.  Patient has no focal tenderness on exam.  I did discuss patient's white count with Dr. Diehl who is on-call for OB today.  Given patient's lack of focal findings that would be concerning for infection and remaining work-up that is reassuring, it was felt patient can follow-up with her primary care provider this next week and have a repeat blood test done which I feel is reasonable.  Certainly there is a low threshold for her return if she does develop worsening or concerning symptoms.  I discussed today's work-up and recommendations with patient in detail.  There was a work and message left to have her OB appointment scheduled by Wednesday this week.  I did order a CBC to be drawn and lab prior to her appointment.  Patient is agreeable to plan of care and discharged in stable condition.       Allergies:  No Known Allergies    Problem List:    Patient Active Problem List    Diagnosis Date Noted     Tobacco use disorder 01/07/2010     Priority: Medium        Past Medical History:    Past Medical History:   Diagnosis Date     NO ACTIVE PROBLEMS        Past Surgical History:    Past Surgical History:   Procedure Laterality Date     NO HISTORY OF SURGERY         Family History:    Family History   Problem Relation Age of Onset     Alopecia Mother      Cervical Cancer Mother      Asthma Father      No Known Problems Sister      Blood Disease Maternal Grandmother      No Known Problems Paternal Grandmother      Heart Disease Paternal Grandfather         \"valve problem\"     No Known Problems Daughter      Asthma Daughter         \"septic\"     No Known Problems Son        Social History:  Marital Status:  Single [1]  Social History     Tobacco Use     Smoking status: Current Every Day Smoker     Packs/day: 0.50     Years: 13.00 "     Pack years: 6.50     Start date: 12/5/2006     Smokeless tobacco: Never Used   Substance Use Topics     Alcohol use: Not Currently     Frequency: Never     Binge frequency: Never     Drug use: Not Currently        Medications:    acetaminophen (TYLENOL) 500 MG tablet  ondansetron (ZOFRAN-ODT) 4 MG ODT tab  oxyCODONE (ROXICODONE) 5 MG tablet  Prenatal w/o A Vit-Fe Fum-FA (PRENATA PO)      Review of Systems   Constitutional: Positive for activity change and appetite change. Negative for chills and fever.   HENT: Negative.    Eyes: Negative.    Respiratory: Negative.    Cardiovascular: Negative.    Gastrointestinal: Positive for abdominal pain (RLQ abd pain with radiation to the right, mid, lateral, abdomen.) and nausea. Negative for blood in stool, constipation and vomiting.   Genitourinary: Positive for vaginal bleeding (Improved). Negative for dysuria.   Musculoskeletal: Negative.    Skin: Negative.    Neurological: Negative.    Psychiatric/Behavioral: Negative.    All other systems reviewed and are negative.      Physical Exam   BP: 126/65  Pulse: 74  Heart Rate: 99  Temp: 98.7  F (37.1  C)  Resp: 17  SpO2: 95 %      Physical Exam  Vitals signs and nursing note reviewed.   Constitutional:       General: She is in acute distress (Mild - RLQ abd pain).      Appearance: She is well-developed and normal weight. She is not ill-appearing, toxic-appearing or diaphoretic.   HENT:      Head: Normocephalic and atraumatic.      Mouth/Throat:      Mouth: Mucous membranes are moist.      Pharynx: Oropharynx is clear.   Eyes:      Extraocular Movements: Extraocular movements intact.      Pupils: Pupils are equal, round, and reactive to light.   Cardiovascular:      Rate and Rhythm: Normal rate.   Pulmonary:      Effort: Pulmonary effort is normal. No respiratory distress.   Abdominal:      General: Bowel sounds are decreased. There is no distension or abdominal bruit. There are no signs of injury.      Palpations: Abdomen is  soft.      Tenderness: There is abdominal tenderness in the right lower quadrant and suprapubic area. There is guarding. There is no right CVA tenderness or rebound.      Hernia: No hernia is present.   Skin:     General: Skin is warm.      Capillary Refill: Capillary refill takes less than 2 seconds.      Findings: No rash.   Neurological:      Mental Status: She is alert and oriented to person, place, and time.   Psychiatric:         Mood and Affect: Mood normal.         Behavior: Behavior normal.         ED Course        Procedures            Critical Care time:  none            Results for orders placed or performed during the hospital encounter of 01/20/20 (from the past 24 hour(s))   CBC with platelets differential   Result Value Ref Range    WBC 17.9 (H) 4.0 - 11.0 10e9/L    RBC Count 4.80 3.8 - 5.2 10e12/L    Hemoglobin 15.3 11.7 - 15.7 g/dL    Hematocrit 43.7 35.0 - 47.0 %    MCV 91 78 - 100 fl    MCH 31.9 26.5 - 33.0 pg    MCHC 35.0 31.5 - 36.5 g/dL    RDW 12.4 10.0 - 15.0 %    Platelet Count 235 150 - 450 10e9/L    Diff Method Automated Method     % Neutrophils 87.7 %    % Lymphocytes 6.7 %    % Monocytes 4.9 %    % Eosinophils 0.2 %    % Basophils 0.2 %    % Immature Granulocytes 0.3 %    Nucleated RBCs 0 0 /100    Absolute Neutrophil 15.6 (H) 1.6 - 8.3 10e9/L    Absolute Lymphocytes 1.2 0.8 - 5.3 10e9/L    Absolute Monocytes 0.9 0.0 - 1.3 10e9/L    Absolute Basophils 0.0 0.0 - 0.2 10e9/L    Abs Immature Granulocytes 0.1 0 - 0.4 10e9/L    Absolute Nucleated RBC 0.0    US Appendix Only (RLQ)    Narrative    EXAM: US APPENDIX ONLY  LOCATION: [facility]  DATE/TIME: 1/20/2020 2:21 AM    INDICATION: Right lower quadrant pain. Rule out appendicitis.  COMPARISON: None.  TECHNIQUE: Graded compression sonography of the right lower quadrant.    FINDINGS: The appendix is not visualized. Therefore, the presence or absence of appendicitis cannot be confirmed. No free fluid or other acute sonographic  findings in the right lower quadrant.      Impression    IMPRESSION:  1.  Appendix not identified.           Medications   acetaminophen (TYLENOL) tablet 975 mg (975 mg Oral Given 1/20/20 0205)       Assessments & Plan (with Medical Decision Making)  29-year-old female to the ER secondary concerns of continued and increased right lower quadrant abdominal pain.  Patient is pregnant at approximately 11 weeks gestational age with her fourth pregnancy.  She was seen earlier yesterday secondary to some vaginal bleeding which was found secondary to subchorionic bleed.  The vaginal bleeding is since stopped but her right lower quadrant abdominal pain has continued.  Patient also was found to have an elevated white blood cell count of 20,000 when seen yesterday in this ER.  There was some concern about the possibility of appendicitis is the reason for her symptoms.  She was told to return to the ER for recheck should she have increasing symptoms.  Examination here revealed tenderness in the right lower quadrant with palpation but otherwise a soft abdomen.  Ultrasound was performed to look for signs of acute appendicitis but the appendix was not able to be identified by ultrasound.  Recheck of the pregnancy revealed the intrauterine pregnancy to be viable with normal heart rate.  No evidence for any free fluid noted in the pelvis.  I did discuss with the patient that the next step if her pain should continue or worsen would be to get an MRI scan of her abdomen to look for the appendix.  Unfortunately, MRI is not available at this time a day in our hospital.  I rechecked a hemoglobin and white blood cell count. The white blood cell count looking some improved from that of yesterday's count.  Patient was given oral Tylenol and had improvement in her pain symptoms.  Discussed monitoring her in the hospital until an MRI can be done.  Patient stated she was feeling improved and desired to return home and will return should she  have increased symptoms.  I spent quite a bit of time with her discussing signs and symptoms of concern and when to return to the ER.  We also scheduled a follow-up appointment for 2 days from now with her obstetrician.     I have reviewed the nursing notes.    I have reviewed the findings, diagnosis, plan and need for follow up with the patient.          I verbally discussed the findings of the evaluation today in the ER. I have verbally discussed with Kassandra the suggested treatment(s) as described in the discharge instructions and handouts.    I have verbally suggested she follow-up in her clinic or return to the ER for increased symptoms. See the follow-up recommendations documented  in the after visit summary in this visit's EPIC chart.    Final diagnoses:   RLQ abdominal pain   Pregnancy complication before birth       1/20/2020   [facility] EMERGENCY DEPARTMENT     Michael Borrego,   01/20/20 0312

## 2020-01-21 VITALS
HEART RATE: 93 BPM | SYSTOLIC BLOOD PRESSURE: 112 MMHG | RESPIRATION RATE: 16 BRPM | DIASTOLIC BLOOD PRESSURE: 67 MMHG | OXYGEN SATURATION: 100 % | TEMPERATURE: 99.1 F

## 2020-01-21 PROCEDURE — 88305 TISSUE EXAM BY PATHOLOGIST: CPT | Performed by: OBSTETRICS & GYNECOLOGY

## 2020-01-21 PROCEDURE — 25000125 ZZHC RX 250: Performed by: OBSTETRICS & GYNECOLOGY

## 2020-01-21 PROCEDURE — 00000159 ZZHCL STATISTIC H-SEND OUTS PREP: Performed by: OBSTETRICS & GYNECOLOGY

## 2020-01-21 PROCEDURE — 59812 TREATMENT OF MISCARRIAGE: CPT | Performed by: OBSTETRICS & GYNECOLOGY

## 2020-01-21 PROCEDURE — 88305 TISSUE EXAM BY PATHOLOGIST: CPT | Mod: 26 | Performed by: OBSTETRICS & GYNECOLOGY

## 2020-01-21 RX ORDER — HYDROXYZINE HYDROCHLORIDE 25 MG/1
25 TABLET, FILM COATED ORAL
Status: DISCONTINUED | OUTPATIENT
Start: 2020-01-21 | End: 2020-01-21 | Stop reason: HOSPADM

## 2020-01-21 RX ORDER — HYDROMORPHONE HYDROCHLORIDE 1 MG/ML
.3-.5 INJECTION, SOLUTION INTRAMUSCULAR; INTRAVENOUS; SUBCUTANEOUS EVERY 5 MIN PRN
Status: DISCONTINUED | OUTPATIENT
Start: 2020-01-21 | End: 2020-01-21 | Stop reason: HOSPADM

## 2020-01-21 RX ORDER — DIMENHYDRINATE 50 MG/ML
25 INJECTION, SOLUTION INTRAMUSCULAR; INTRAVENOUS
Status: DISCONTINUED | OUTPATIENT
Start: 2020-01-21 | End: 2020-01-21 | Stop reason: HOSPADM

## 2020-01-21 RX ORDER — ONDANSETRON 4 MG/1
4 TABLET, ORALLY DISINTEGRATING ORAL
Status: DISCONTINUED | OUTPATIENT
Start: 2020-01-21 | End: 2020-01-21 | Stop reason: HOSPADM

## 2020-01-21 RX ORDER — ACETAMINOPHEN 325 MG/1
650 TABLET ORAL
Status: DISCONTINUED | OUTPATIENT
Start: 2020-01-21 | End: 2020-01-21 | Stop reason: HOSPADM

## 2020-01-21 RX ORDER — NALOXONE HYDROCHLORIDE 0.4 MG/ML
.1-.4 INJECTION, SOLUTION INTRAMUSCULAR; INTRAVENOUS; SUBCUTANEOUS
Status: DISCONTINUED | OUTPATIENT
Start: 2020-01-21 | End: 2020-01-21 | Stop reason: HOSPADM

## 2020-01-21 RX ORDER — ONDANSETRON 4 MG/1
4 TABLET, ORALLY DISINTEGRATING ORAL EVERY 30 MIN PRN
Status: DISCONTINUED | OUTPATIENT
Start: 2020-01-21 | End: 2020-01-21 | Stop reason: HOSPADM

## 2020-01-21 RX ORDER — OXYCODONE AND ACETAMINOPHEN 5; 325 MG/1; MG/1
1 TABLET ORAL
Status: DISCONTINUED | OUTPATIENT
Start: 2020-01-21 | End: 2020-01-21 | Stop reason: HOSPADM

## 2020-01-21 RX ORDER — FENTANYL CITRATE 50 UG/ML
25-50 INJECTION, SOLUTION INTRAMUSCULAR; INTRAVENOUS
Status: DISCONTINUED | OUTPATIENT
Start: 2020-01-21 | End: 2020-01-21 | Stop reason: HOSPADM

## 2020-01-21 RX ORDER — SODIUM CHLORIDE, SODIUM LACTATE, POTASSIUM CHLORIDE, CALCIUM CHLORIDE 600; 310; 30; 20 MG/100ML; MG/100ML; MG/100ML; MG/100ML
INJECTION, SOLUTION INTRAVENOUS CONTINUOUS
Status: DISCONTINUED | OUTPATIENT
Start: 2020-01-21 | End: 2020-01-21 | Stop reason: HOSPADM

## 2020-01-21 RX ORDER — ONDANSETRON 2 MG/ML
4 INJECTION INTRAMUSCULAR; INTRAVENOUS EVERY 30 MIN PRN
Status: DISCONTINUED | OUTPATIENT
Start: 2020-01-21 | End: 2020-01-21 | Stop reason: HOSPADM

## 2020-01-21 NOTE — DISCHARGE INSTRUCTIONS
Suction Curettage (Therapeutic Dilation & Curettage, D&C)  Suction curettage is a procedure to remove the lining and contents of the uterus. It may be done to stop bleeding, control pain, and prevent infection after a miscarriage, , or childbirth. It may also be done to remove a molar pregnancy. This is when tumors grow in the womb instead of or in addition to a fetus.  After the procedure, you should be able to return to your normal routine in 1 or 2 days. But you may have some cramping and light bleeding. This is normal. These problems should go away within 5 to 7 days. You can expect to have your next period within 4 to 6 weeks.  Home care    If you have pain or cramping, use pain medicine as directed.    If you have light bleeding, use pads instead of tampons. Change these as often as needed.    Don't douche, use tampons, or have sex until your healthcare provider says it s OK.    Take showers instead of baths for 1 to 2 weeks.  Follow-up care  Follow-up with your healthcare provider as directed.  When to seek medical advice  Call your healthcare provider right away if any of these occur:    Fever of 100.4 F (38 C) or higher, or as directed by your healthcare provider    Heavy bleeding    Bleeding that lasts longer than 1 week    Pain or cramping worsens instead of getting better    Foul-smelling discharge from the vagina    Passage of anything that resembles tissue from the vagina. If possible, save the tissue and bring it to the healthcare provider.    Weakness, dizziness, or fainting  Date Last Reviewed: 2018-2019 [employer]. [address] [place] [address], [place] [address]. All rights reserved. This information is not intended as a substitute for professional medical care. Always follow your healthcare professional's instructions.    [facility]   Adult Discharge Orders & Instructions     For 24 hours after surgery    1. Get plenty of rest.  A responsible  adult must stay with you for at least 24 hours after you leave the hospital.   2. Do not drive or use heavy equipment.  If you have weakness or tingling, don't drive or use heavy equipment until this feeling goes away.  3. Do not drink alcohol.  4. Avoid strenuous or risky activities.  Ask for help when climbing stairs.   5. You may feel lightheaded.  If so, sit for a few minutes before standing.  Have someone help you get up.   6. You may have a slight fever. Call the doctor if your fever is over 100 F (37.7 C) (taken under the tongue) or lasts longer than 24 hours.  7. You may have a dry mouth, a sore throat, muscle aches or trouble sleeping.  These should go away after 24 hours.  8. Do not make important or legal decisions.  We don t expect you to have any problems from the surgery or treatment you had today. Just in case, here s what to do if you have pain, upset stomach (nausea), bleeding or infection:  Pain:  Take medicines your doctor has prescribed or over-the-counter medicine they have suggested. Resting and using ice packs can help, too. For surgery on an arm or leg, raise it on a pillow to ease swelling. Call your doctor if these methods don t work.  Copyright [employer], Licensed under [employer]  Upset stomach (nausea):  Take anti-nausea medicine approved by your doctor. Drink clear liquids like apple juice, ginger ale, broth or 7-Up. Be sure to drink enough fluids. Rest can help, too. Move to normal foods when you re ready. Bleeding:  In the first 24 hours, you may see a little blood on your dressing, about the size of a quarter. You don t need to worry about this much blood, but if the blood spot keeps getting bigger:    Put pressure on the wound if you can, AND    Call your doctor.  Copyright [employer], Licensed under [employer]  Fever/Infection: Please call your doctor if you have any of these signs:    Redness    Swelling    Wound feels warm    Pain gets worse    Bad-smelling  fluid leaks from wound    Fever or chills  Call your doctor for any of the followin.  It has been over 8 to 10 hours since surgery and you are still not able to urinate (pass water).    2.  Headache for over 24 hours.    Nurse advice line: [phone]

## 2020-01-21 NOTE — RESULT ENCOUNTER NOTE
Final urine culture report is NEGATIVE per [facility] Result protocol.    If NEGATIVE result, no change in treatment, per [facility] Result protocol.

## 2020-01-21 NOTE — ANESTHESIA POSTPROCEDURE EVALUATION
Patient: Kassandra Mead    Procedure(s):  DILATION AND CURETTAGE, UTERUS, USING SUCTION US guidance    Diagnosis:Incomplete  with delayed or excessive hemorrhage [O03.1]  Diagnosis Additional Information: No value filed.    Anesthesia Type:  No value filed.    Note:  Anesthesia Post Evaluation    Patient location during evaluation: Phase 2  Patient participation: Able to fully participate in evaluation  Level of consciousness: awake and alert  Pain management: adequate  Airway patency: patent  Cardiovascular status: blood pressure returned to baseline  Respiratory status: spontaneous ventilation and room air  Hydration status: acceptable  PONV: none     Anesthetic complications: None    Comments: Patient was very pleased with her anesthesia today. No anesthesia concerns.         Last vitals:  Vitals:    20 2236 20 2245 20 2300   BP: 106/51 101/55 94/57   Pulse: 97 104 93   Resp: 16     Temp: 98.9  F (37.2  C)     SpO2: 95% 95% 96%         Electronically Signed By: ALLIE Shelton CRNA  2020  12:20 AM

## 2020-01-21 NOTE — OP NOTE
HOSPITAL OPERATIVE NOTE  DATE/TIME OF SURGERY: 2020  PATIENT NAME: Kassandra Mead  MRN: [unfilled]  PATIENT : [unfilled]      Preoperative Diagnosis: Incomplete  with hemorrhage    Postoperative Diagnosis: same    Surgeon: Ekta Hernandez DO    Assist: none    Procedure:  Suction D+C    Anesthesia: MAC, paracervical block    EBL:   200 ml    Urine output:    NA    IVF:  300  ml    Speciman:  Products of Conception    Findings: Gravid uterus, cervix 4cm dilated with products at cervical os. 2 large blood clots in vagina, approx 200 ml.     Complications:  None    Indication: Kassandra Mead is a 29 year old  s/p incomplete Ab with passage of fetus and hemorrhage. Fetus examined and intact; however, persistent heavy bleeding with associated tachycardia and hypotension, requiring IVF and initiation of 2u PRBCs. Bleeding not controlled with 800mcg NV Cytotec. Given unstable vital signs with continued hemorrhage, recommend dilation and curettage with suction in the OR tonight. Details of the procedure were discussed with the patient.  Risks include, but are not limited to, bleeding, infection, and injury to surrounding organs such as the bowel, urinary system, nerves, and blood vessels.  Injury may result in repair at the time of the surgery or in a separate procedure. Ultrasound not in-house overnight and just arrived. Will move to OR and perform ultrasound in the OR, with ultrasound-guidance. Doxycycline given in ER for preop abx ppx. All questions answered and patient in agreement with plan.    Procedure: Patient was taken to the operating room with IV fluids running where she was placed under general anesthesia with endotracheal intubation without difficulty.  She was placed on dorsal supine position with feet and yellowfin stirrups.  She was prepped and draped in the normal sterile fashion. A exam under anesthesia was performed with spontaneous delivery of 2 large blood clots and large portion of  placenta tissue.  A medium graves speculum was placed in the vagina help visualize the cervix, the anterior lip of the cervix was grasped with a single-tooth tenaculum.  A paracervical block was placed in standard fashion at 4 and 8 o'clock with 1% lidocaine with epi. The cervix was already dilated 4cm and did not require further mechanical dilation.  A 10mm curved suction curette was advanced to the uterine fundus under ultrasound guidance and activated per protocol with removal of products of conception.  After complete removal products, the suction curette was removed as was the single-tooth tenaculum. Hemostasis from tenaculum site obtained with silver nitrate.  Good hemostasis was noted and all instruments were removed from the vagina.  The procedure was then ended.    Patient was awakened from anesthesia without difficulty and taken to PACU in stable condition.  All insurance sponge counts were correct x2.    Ekta Hernandez DO

## 2020-01-21 NOTE — H&P
New OB Visit    Kassandra Mead is a 29 year old  s/p incomplete Ab at 10w6d at home who presented to the ER tonight with continued heavy bleeding and cramping. Maxine has been seen in the ER x3 in the last 24 hours for abdominal pain and cramping with an elevated WBC, light-moderate vaginal bleeding and otherwise unremarkable exam/testing. At about 6pm this evening, she had heavy vaginal bleeding followed by worsening cramping and passage of fetal products. Fetus brought in in Tupperware and appears grossly intact. She then in the tub for an hour with persistent bleeding that were soaking through multiple pads, before calling EMS.She also complains of syncopal episode at home when standing from tub. She did not hit her head. Bps on arrival were in the 90s systolic, improving to 100s with IVF.    While in the ER, heavy bleeding persisted. Vaginal exam performed by provider in ER, with note of friable tissue at cervix, removed with forceps. Patient became hypertensive with systolic in 80s, and persistent tachycardia in the 90-100s. Bleeding persisted despite 800mcg Cytotec MD. Given persistent heavy bleeding and vital signs, 2u PRBCs ordered. CBC on arrival with H/H 13/6/39.1 (down from 14.8/43.1).    Patient currently feeling fatigued with continued cramping and bleeding.      Pregnancy complicated by.  -Current everyday smoker, 1 ppd x13yrs.   -Subchorionic hemorrhage     Ob Hx:  s/p SVDx3, uncomplicated.  Last delivery .  Past Medical History of Father of Baby: No significant medical history     Gyn Hx: Patient's last menstrual period was 2019.                Last pap was 2018 NIL, No history of abnormal paps. Next pap due 2021              STI history denies      Most Recent Immunizations   Administered Date(s) Administered     Influenza Vaccine IM > 6 months Valent IIV4 2020     Tdap (Adult) Unspecified Formulation 2014        PMH:   Past Medical History:   Diagnosis Date  "    NO ACTIVE PROBLEMS        SurgHx:   Past Surgical History:   Procedure Laterality Date     NO HISTORY OF SURGERY         FamHx:   Family History   Problem Relation Age of Onset     Alopecia Mother      Cervical Cancer Mother      Asthma Father      No Known Problems Sister      Blood Disease Maternal Grandmother      No Known Problems Paternal Grandmother      Heart Disease Paternal Grandfather         \"valve problem\"     No Known Problems Daughter      Asthma Daughter         \"septic\"     No Known Problems Son        SocHx:   Social History     Socioeconomic History     Marital status: Single     Spouse name: Not on file     Number of children: Not on file     Years of education: Not on file     Highest education level: Not on file   Occupational History     Not on file   Social Needs     Financial resource strain: Not on file     Food insecurity:     Worry: Not on file     Inability: Not on file     Transportation needs:     Medical: Not on file     Non-medical: Not on file   Tobacco Use     Smoking status: Current Every Day Smoker     Packs/day: 0.50     Years: 13.00     Pack years: 6.50     Start date: 12/5/2006     Smokeless tobacco: Never Used   Substance and Sexual Activity     Alcohol use: Not Currently     Frequency: Never     Binge frequency: Never     Drug use: Not Currently     Sexual activity: Yes     Partners: Male   Lifestyle     Physical activity:     Days per week: Not on file     Minutes per session: Not on file     Stress: Not on file   Relationships     Social connections:     Talks on phone: Not on file     Gets together: Not on file     Attends Nondenominational service: Not on file     Active member of club or organization: Not on file     Attends meetings of clubs or organizations: Not on file     Relationship status: Not on file     Intimate partner violence:     Fear of current or ex partner: Not on file     Emotionally abused: Not on file     Physically abused: Not on file     Forced sexual " activity: Not on file   Other Topics Concern     Not on file   Social History Narrative    12/2019  Lives in [place] with S.DONELL, Murray, 2 daughters and 1 son.  Kassandra and Murray smoke.  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   No indoor cats/kittens.   No concerns about domestic violence.         Allergies:   Patient has no known allergies.    Medications:   [COMPLETED] acetaminophen (TYLENOL) tablet 975 mg    acetaminophen (TYLENOL) 500 MG tablet, Take 500-1,000 mg by mouth every 6 hours as needed for mild pain  ondansetron (ZOFRAN-ODT) 4 MG ODT tab, Take 1 tablet (4 mg) by mouth every 8 hours as needed for nausea  oxyCODONE (ROXICODONE) 5 MG tablet, Take 1 tablet (5 mg) by mouth every 6 hours as needed for pain  Prenatal w/o A Vit-Fe Fum-FA (PRENATA PO),         Past medical, surgical, social and family history were reviewed and updated in Epic.      ROS: As described in HPI, otherwise negative for fever/chills, fatigue, dizziness, changes or new deficits in vision, worrisome rashes, new lumps or masses, cough/SOB/CP, GI distress, dysuria, abnormal vaginal discharge, constipation/diarrhea, neurological deficits, or other systemic complaints    EXAM:  Vitals: BP 94/52   Pulse 98   Temp 98.5  F (36.9  C)   Resp 17   LMP 11/05/2019   SpO2 99%   BMI= There is no height or weight on file to calculate BMI.      Gen: Alert, oriented, appropriately interactive, NAD  Neck: soft, no cervical adenopathy, no masses  Chest: Symmetrical, unlabored breathing  Abdomen: soft, gravid, ttp diffusely lower abdomin/pelvis, non distended, no masses, no hernias. No inguinal lymphadenopathy  Pelvic: Deferred to OR given prior exam in ER with persistent bleeding  Lower extremities: non-tender, no edema  Skin: no lesions or rashes        Labs & Imaging:  Recent Labs   Lab Test 01/20/20 2059   ABO A   RH Pos   AS Neg       Recent Labs   Lab Test 12/09/19  1852   HEPBANG Nonreactive   HIAGAB Nonreactive   RUQIGG 13     CBC  RESULTS:   Recent Labs   Lab Test 20   WBC 18.3*   RBC 4.27   HGB 13.6   HCT 39.1   MCV 92   MCH 31.9   MCHC 34.8   RDW 12.5          Results for orders placed or performed during the hospital encounter of 20   US Appendix Only (RLQ)    Narrative    EXAM: US APPENDIX ONLY  LOCATION: [facility]  DATE/TIME: 2020 2:21 AM    INDICATION: Right lower quadrant pain. Rule out appendicitis.  COMPARISON: None.  TECHNIQUE: Graded compression sonography of the right lower quadrant.    FINDINGS: The appendix is not visualized. Therefore, the presence or absence of appendicitis cannot be confirmed. No free fluid or other acute sonographic findings in the right lower quadrant.      Impression    IMPRESSION:  1.  Appendix not identified.               ASSESSMENT/PLAN: Kassandra Mead is a 29 year old  s/p incomplete Ab with passage of fetus,  hemorrhage       ICD-10-CM    1. Miscarriage O03.9    2. Vaginal hemorrhage N93.9        Incomplete  at home at 10w6d, with passage of fetus. Fetus examined and intact; however, persistent heavy bleeding with associated tachycardia and hypotension, requiring IVF and initiation of 2u PRBCs. Bleeding not controlled with 800mcg [place] Cytotec. Given unstable vital signs with continued hemorrhage, recommend dilation and curettage with suction in the OR tonight. Details of the procedure were discussed with the patient.  Risks include, but are not limited to, bleeding, infection, and injury to surrounding organs such as the bowel, urinary system, nerves, and blood vessels.  Injury may result in repair at the time of the surgery or in a separate procedure. Ultrasound not in-house overnight and just arrived. Will move to OR and perform ultrasound in the OR, with ultrasound-guidance. Doxycycline given in ER for preop abx ppx. All questions answered and patient in agreement with plan.    Ekta Hernandez,   2020 10:35 PM

## 2020-01-21 NOTE — OR NURSING
[facility]  Discharge Call Back  Kassandra Mead  1990  MRN: [id number]  Home: [phone] (home)   PCP: Emma, Aubrie Pemberton    We are calling to see how you're doing since your surgery/procedure with us?   Comments: doing fine  Clinical Questions  1. Have you had time to look at your discharge instructions? Do you have any questions in regards to the instructions?   Comment: yes, no  2. Do you feel your pain is being controlled with the regimen the surgeon sent you home on? (ie: prescription medications, over the counter pain medications, ice packs)   Comments: yes  3. Have you noticed any drainage on your dressing? Do you know what to do if you have bleeding as a result of your procedure?   Comments: yes, no  4. Have you had any nausea/vomiting? Do you know how to treat this?   Comment: no, yes  5. Have you had any signs/symptoms of infection? (ie: fever, swelling, heat, drainage or redness) Do you know what to do if you have?   Comment: no, yes  6. Do you have a follow up appointment made with your surgeon? Do you have a number to contact them at if you need it?   Comment: no, yes  Retained Foreign Object (MYA, Hemovac, Penrose, Wound Packing, Vaginal Packing, Nasal Splints, Urethral Stents, Phelps Catheter)  1. Do you still have - in place?   2. If the item is still in place, can you review the plan for removal with me? -      You may be randomly selected to fill out a [facility] survey. We would appreciate you taking the time to fill this out. It is important to us if you would answer all of the questions on the survey.

## 2020-01-21 NOTE — ED TRIAGE NOTES
Pt presents by EMS with concerns of having vaginal bleeding.  Pt miscarried at 11 weeks prior to EMS arrival.  Pt had a syncopal episode at home.  Pt has had heavy vaginal bleeding.

## 2020-01-21 NOTE — TELEPHONE ENCOUNTER
Patient called stating she just had a miscarriage. She has the baby in her hand. She is bleeding a lot. She thinks she passed all of the contents and wants to know if she should go to the ER.    Dr. Sawyer consulted and per his recommendations patient should go to the ER.  She will comply with this plan.    Rosibel Judd RN on 1/20/2020 at 7:04 PM

## 2020-01-21 NOTE — ED PROVIDER NOTES
History     Chief Complaint   Patient presents with     Miscarriage     HPI  Kassandra Mead is a 29 year old female who presents to the emergency department via EMS for miscarriage.  The patient was seen here yesterday afternoon for lower abdominal cramping and vaginal bleeding.  She had a subchorionic hemorrhage on the ultrasound but live intrauterine pregnancy so she was discharged home.  Was seen earlier today around 2 AM in the ED again for increased lower abdominal pain but slowed vaginal bleeding.  There was concern for appendicitis and she was advised to complete an MRI scan which was unable to be completed at that time of night.  She was discharged home after feeling improved with Tylenol with plan to follow-up with OB in the next couple days.  Went to [facility] ER later today and had an MRI scan and completed to evaluate for appendicitis which was negative.  She reports she has had some mild spotting throughout her pregnancy that did get a little worse yesterday but nothing severe.  This evening around 6 PM she developed severe vaginal bleeding and passed the fetus in the toilet.  She did get it out of the toilet and bring it here in a Tupperware container.  She sat in the tub for over an hour with constant vaginal bleeding because she did not have enough pads to control it.  Once her boyfriend got home she called the ambulance and they brought her here.  She could not come in before because she had her 3 children at home.  She did have a syncopal episode at home when she stood up to get out of the tub.  She did not hit her head or sustain any injuries from the collapse.  When EMS had her stand up she became diaphoretic.  Her blood pressure in route was 90s systolic so they gave her IV fluids which improved it to 100 systolic.  Currently she complains of significant cramping pain but denies lightheadedness.  Denies any fevers, no nausea or vomiting.    Allergies:  No Known Allergies    Problem List:   "  Patient Active Problem List    Diagnosis Date Noted     Tobacco use disorder 01/07/2010     Priority: Medium        Past Medical History:    Past Medical History:   Diagnosis Date     NO ACTIVE PROBLEMS        Past Surgical History:    Past Surgical History:   Procedure Laterality Date     NO HISTORY OF SURGERY         Family History:    Family History   Problem Relation Age of Onset     Alopecia Mother      Cervical Cancer Mother      Asthma Father      No Known Problems Sister      Blood Disease Maternal Grandmother      No Known Problems Paternal Grandmother      Heart Disease Paternal Grandfather         \"valve problem\"     No Known Problems Daughter      Asthma Daughter         \"septic\"     No Known Problems Son        Social History:  Marital Status:  Single [1]  Social History     Tobacco Use     Smoking status: Current Every Day Smoker     Packs/day: 0.50     Years: 13.00     Pack years: 6.50     Start date: 12/5/2006     Smokeless tobacco: Never Used   Substance Use Topics     Alcohol use: Not Currently     Frequency: Never     Binge frequency: Never     Drug use: Not Currently        Medications:    acetaminophen (TYLENOL) 500 MG tablet  ondansetron (ZOFRAN-ODT) 4 MG ODT tab  oxyCODONE (ROXICODONE) 5 MG tablet  Prenatal w/o A Vit-Fe Fum-FA (PRENATA PO)          Review of Systems   All other systems reviewed and are negative.      Physical Exam   BP: 110/58  Pulse: 94  Heart Rate: 92  Temp: 97.8  F (36.6  C)  Resp: 17  SpO2: 99 %      Physical Exam  Vitals signs and nursing note reviewed. Exam conducted with a chaperone present.   Constitutional:       General: She is not in acute distress.     Appearance: Normal appearance. She is not ill-appearing, toxic-appearing or diaphoretic.   HENT:      Head: Normocephalic and atraumatic.      Nose: Nose normal.      Mouth/Throat:      Mouth: Mucous membranes are moist.   Eyes:      Extraocular Movements: Extraocular movements intact.      Conjunctiva/sclera: " Conjunctivae normal.   Neck:      Musculoskeletal: Neck supple.   Cardiovascular:      Rate and Rhythm: Regular rhythm. Tachycardia present.      Heart sounds: Normal heart sounds.   Pulmonary:      Effort: Pulmonary effort is normal. No respiratory distress.      Breath sounds: Normal breath sounds.   Abdominal:      General: Abdomen is flat.      Tenderness: There is abdominal tenderness (lower). There is no guarding or rebound.   Genitourinary:     Exam position: Supine.      Vagina: Bleeding (large clots, continued slow filling of vaginal vault with bright red blood) present.      Cervix: Dilated. Discharge (small amount of products of conceptions removed with ring forceps, continued bleeding visualized) and cervical bleeding (difficult to fully visualize os due to persistent refilling of blood in vault once partially cleared) present.   Skin:     General: Skin is warm and dry.   Neurological:      Mental Status: She is alert and oriented to person, place, and time. Mental status is at baseline.   Psychiatric:         Mood and Affect: Mood normal.         Behavior: Behavior normal.         ED Course        Procedures        Critical Care time:  was 60 minutes for this patient excluding procedures.      Results for orders placed or performed during the hospital encounter of 01/20/20 (from the past 24 hour(s))   CBC with platelets differential   Result Value Ref Range    WBC 18.3 (H) 4.0 - 11.0 10e9/L    RBC Count 4.27 3.8 - 5.2 10e12/L    Hemoglobin 13.6 11.7 - 15.7 g/dL    Hematocrit 39.1 35.0 - 47.0 %    MCV 92 78 - 100 fl    MCH 31.9 26.5 - 33.0 pg    MCHC 34.8 31.5 - 36.5 g/dL    RDW 12.5 10.0 - 15.0 %    Platelet Count 223 150 - 450 10e9/L    Diff Method Automated Method     % Neutrophils 86.2 %    % Lymphocytes 7.9 %    % Monocytes 4.9 %    % Eosinophils 0.3 %    % Basophils 0.2 %    % Immature Granulocytes 0.5 %    Nucleated RBCs 0 0 /100    Absolute Neutrophil 15.7 (H) 1.6 - 8.3 10e9/L    Absolute  Lymphocytes 1.5 0.8 - 5.3 10e9/L    Absolute Monocytes 0.9 0.0 - 1.3 10e9/L    Absolute Basophils 0.0 0.0 - 0.2 10e9/L    Abs Immature Granulocytes 0.1 0 - 0.4 10e9/L    Absolute Nucleated RBC 0.0    ABO/Rh type and screen   Result Value Ref Range    ABO A     RH(D) Pos     Antibody Screen Neg     Test Valid Only At [facility]        Specimen Expires 01/23/2020    Basic metabolic panel   Result Value Ref Range    Sodium 137 133 - 144 mmol/L    Potassium 3.6 3.4 - 5.3 mmol/L    Chloride 105 94 - 109 mmol/L    Carbon Dioxide 24 20 - 32 mmol/L    Anion Gap 8 3 - 14 mmol/L    Glucose 94 70 - 99 mg/dL    Urea Nitrogen 7 7 - 30 mg/dL    Creatinine 0.71 0.52 - 1.04 mg/dL    GFR Estimate >90 >60 mL/min/[1.73_m2]    GFR Estimate If Black >90 >60 mL/min/[1.73_m2]    Calcium 8.0 (L) 8.5 - 10.1 mg/dL   HCG quantitative pregnancy   Result Value Ref Range    HCG Quantitative Serum 26,046 (H) 0 - 5 IU/L   Blood component   Result Value Ref Range    Unit Number [id number]     Blood Component Type Red Blood Cells Leukocyte Reduced     Division Number 00     Status of Unit Released to care unit 01/20/2020 2213     Blood Product Code [id number]     Unit Status ISS    Blood component   Result Value Ref Range    Unit Number [id number]     Blood Component Type Red Blood Cells Leukocyte Reduced     Division Number 00     Status of Unit Ready for patient 01/20/2020 2210     Blood Product Code [id number]     Unit Status XAVIER        Medications   0.9% sodium chloride BOLUS (0 mLs Intravenous Stopped 1/20/20 2144)     Followed by   sodium chloride 0.9% infusion (1,000 mLs Intravenous New Bag 1/20/20 2145)   doxycycline (VIBRAMYCIN) 100 mg vial to attach to  mL bag (100 mg Intravenous New Bag 1/20/20 2216)   misoprostol (CYTOTEC) suppository 800 mcg (800 mcg Rectal Given 1/20/20 2112)   HYDROmorphone (PF) (DILAUDID) injection 0.5 mg (0.5 mg Intravenous Given 1/20/20 2157)       Assessments & Plan (with Medical Decision  Making)  Kassandra Mead is a 29 year old female who presented to the ED via EMS for heavy vaginal bleeding after presumed miscarriage.  This is her fourth ER visit in 24 hours for cramping and vaginal bleeding.  Reports that she passed the fetus at home and did bring it in a Plumbrware bin.  On arrival to the ED she was tachycardic in the 100s.  Blood pressure ranging 90s to 110s systolic.  She denied lightheadedness, reporting only lower abdominal cramping/pain.  She did have generalized lower abdominal tenderness.  Pelvic exam revealed heavy vaginal bleeding with large clots.  Some products of conception visualized but cervical loss difficult to examine due to persistent bleeding.  Patient given small dose IV Dilaudid here for pain control.  Case discussed with Dr. Hernandez of OB/GYN who was agreeable to plan to giving Cytotec given her persistent bleeding.  This was administered and labs drawn.  She was given bolus of fluids here.  Labs revealed a white count of 18.3, hemoglobin of 13.6 down from 15.3 earlier today.  Quantitative hCG is dropping since yesterday consistent with miscarriage.  Ultrasound was ordered however 50 minutes after Cytotec administration I reexamined the patient and she continued to pass large clots and had soaked through another large pad so unfortunately it looks like the Cytotec is not working.  I discussed case again with Dr. Hernandez who graciously agreed to come to the ED with plan to perform D&C as it is likely she has retained products of conception.  Shortly after discussing case with her patient's blood pressure dropped to 80s/40s and she was persistently tachycardic.  Given her persistent hemorrhaging and abnormal vital signs after discussing case with Dr. Bell it was decided we would give her 2 units of red blood cells.  This was ordered and given here in the ED.  She was also given IV doxycycline for preoperative prophylaxis as well as to cover for potential infectious  cause of miscarriage given her persistently elevated white count here.  Her blood pressure and heart rate did improve to 100s/50s and 90s respectively when blood products were given.  Plan to go to the OR for D&C with OB/GYN. Staffed in the ED with Dr. Bell.     I have reviewed the nursing notes.    I have reviewed the findings, diagnosis, plan and need for follow up with the patient.    New Prescriptions    No medications on file       Final diagnoses:   Miscarriage   Vaginal hemorrhage     Note: Chart documentation done in part with Dragon Voice Recognition software. Although reviewed after completion, some word and grammatical errors may remain.    1/20/2020   [facility] EMERGENCY DEPARTMENT     Mónica Foley PA-C  01/20/20 2308       Abdelrahman Bell MD  01/31/20 9622

## 2020-01-21 NOTE — ANESTHESIA CARE TRANSFER NOTE
Patient: Kassandra Mead    Procedure(s):  DILATION AND CURETTAGE, UTERUS, USING SUCTION US guidance    Diagnosis: Incomplete  with delayed or excessive hemorrhage [O03.1]  Diagnosis Additional Information: No value filed.    Anesthesia Type:   No value filed.     Note:  Airway :Room Air  Patient transferred to:PACU  Handoff Report: Identifed the Patient, Identified the Reponsible Provider, Reviewed the pertinent medical history, Discussed the surgical course, Reviewed Intra-OP anesthesia mangement and issues during anesthesia, Set expectations for post-procedure period and Allowed opportunity for questions and acknowledgement of understanding      Vitals: (Last set prior to Anesthesia Care Transfer)    CRNA VITALS  2020 2319 - 2020 0019      2020             NIBP:  102/64                Electronically Signed By: ALLIE Shelton CRNA  2020  12:20 AM

## 2020-01-21 NOTE — ANESTHESIA PREPROCEDURE EVALUATION
Anesthesia Pre-Procedure Evaluation    Patient: Kassandra Mead   MRN: [id number] : 1990          Preoperative Diagnosis: Incomplete  with delayed or excessive hemorrhage [O03.1]    Procedure(s):  DILATION AND CURETTAGE, UTERUS, USING SUCTION US guidance    Past Medical History:   Diagnosis Date     NO ACTIVE PROBLEMS      Past Surgical History:   Procedure Laterality Date     NO HISTORY OF SURGERY         Anesthesia Evaluation     .             ROS/MED HX    ENT/Pulmonary:     (+)tobacco use, Current use , . .    Neurologic:  - neg neurologic ROS     Cardiovascular:  - neg cardiovascular ROS   (+) ----. : . . . :. . No previous cardiac testing       METS/Exercise Tolerance: Comment: Patient currently receiving 1 Unit PRBC from the ED    Hematologic:     (+) Anemia, -      Musculoskeletal:  - neg musculoskeletal ROS       GI/Hepatic:  - neg GI/hepatic ROS       Renal/Genitourinary:  - ROS Renal section negative   (+) Pt has no history of transplant,       Endo:  - neg endo ROS       Psychiatric:  - neg psychiatric ROS       Infectious Disease:  - neg infectious disease ROS       Malignancy:      - no malignancy   Other:    (+) No chance of pregnancy C-spine cleared: N/A, no H/O Chronic Pain,                        Physical Exam  Normal systems: cardiovascular, pulmonary and dental    Airway   Mallampati: II  TM distance: >3 FB  Neck ROM: full    Dental     Cardiovascular   Rhythm and rate: regular and normal      Pulmonary    breath sounds clear to auscultation            Lab Results   Component Value Date    WBC 18.3 (H) 2020    HGB 13.6 2020    HCT 39.1 2020     2020     2020    POTASSIUM 3.6 2020    CHLORIDE 105 2020    CO2 24 2020    BUN 7 2020    CR 0.71 2020    GLC 94 2020    NITIN 8.0 (L) 2020    ALBUMIN 3.1 (L) 2020    PROTTOTAL 6.4 (L) 2020    ALT 22 2020    AST 17 2020    ALKPHOS 59  "01/19/2020    BILITOTAL 0.2 01/19/2020    HCG Positive (A) 12/05/2019       Preop Vitals  BP Readings from Last 3 Encounters:   01/20/20 94/57   01/20/20 107/61   01/19/20 110/73    Pulse Readings from Last 3 Encounters:   01/20/20 93   01/20/20 74   01/19/20 111      Resp Readings from Last 3 Encounters:   01/20/20 16   01/20/20 16   01/19/20 18    SpO2 Readings from Last 3 Encounters:   01/20/20 96%   01/20/20 96%   01/19/20 97%      Temp Readings from Last 1 Encounters:   01/20/20 98.9  F (37.2  C) (Oral)    Ht Readings from Last 1 Encounters:   01/13/20 1.651 m (5' 5\")      Wt Readings from Last 1 Encounters:   01/13/20 73.2 kg (161 lb 4.8 oz)    Estimated body mass index is 26.84 kg/m  as calculated from the following:    Height as of 1/13/20: 1.651 m (5' 5\").    Weight as of 1/13/20: 73.2 kg (161 lb 4.8 oz).       Anesthesia Plan      History & Physical Review  History and physical reviewed and following examination; no interval change.    ASA Status:  1 emergent.    NPO Status:  > 8 hours    Plan for MAC with Intravenous and Propofol induction. Maintenance will be TIVA.  Reason for MAC:  Deep or markedly invasive procedure (G8)  PONV prophylaxis:  Ondansetron (or other 5HT-3)       Postoperative Care  Postoperative pain management:  Oral pain medications and IV analgesics.      Consents  Anesthetic plan, risks, benefits and alternatives discussed with:  Patient..                 ALLIE Shelton CRNA  "

## 2020-01-22 LAB
BLD PROD TYP BPU: NORMAL
BLD UNIT ID BPU: 0
BLOOD PRODUCT CODE: NORMAL
BPU ID: NORMAL
TRANSFUSION STATUS PATIENT QL: NORMAL
TRANSFUSION STATUS PATIENT QL: NORMAL

## 2020-01-24 LAB — COPATH REPORT: NORMAL

## 2020-01-27 ENCOUNTER — OFFICE VISIT (OUTPATIENT)
Dept: OBGYN | Facility: CLINIC | Age: 30
End: 2020-01-27
Payer: COMMERCIAL

## 2020-01-27 DIAGNOSIS — Z98.890 POSTOPERATIVE STATE: Primary | ICD-10-CM

## 2020-01-27 DIAGNOSIS — Z30.017 INSERTION OF IMPLANTABLE SUBDERMAL CONTRACEPTIVE: ICD-10-CM

## 2020-01-27 PROCEDURE — 11981 INSERTION DRUG DLVR IMPLANT: CPT | Performed by: OBSTETRICS & GYNECOLOGY

## 2020-01-27 PROCEDURE — 99024 POSTOP FOLLOW-UP VISIT: CPT | Performed by: OBSTETRICS & GYNECOLOGY

## 2020-01-27 NOTE — PROGRESS NOTES
Gyn Post-op Visit    Kassandra Mead is a 29 year old  s/p SAB at 10w6d w/ retained POC and hemorrhage s/p sD+C on  . Her procedure was uncomplicated, but she did receive 2 uRPBCs which were initiated in the ER due to excessive blood loss. She was discharged to home after her procedure. Since then, she has been doing well. Her bleeding is scant. She cramping or abdominal pain. She denies f/c, excessive fatigue, dizziness, cp/sob, n/v, dysuria, constipation/diarrhea. She has not been sexually active since prior to the pregnancy loss. Only complaint today is some difficulty with sleeping and nightmares from the loss. Interested in going to a support group.    She is Rh positive.    Birth control- previously used IUD and Depo w/ neg SE. Desires Nexplanon today, no desire for pregnancy in near future.    Ob Hx:  s/p SVDx3, uncomplicated.  Last delivery . 1 SAB .  Past Medical History of Father of Baby: No significant medical history    Gyn Hx: Patient's last menstrual period was 2019.    Last pap was 2018 NIL, No history of abnormal paps. Next pap due 2021   STI history denies      Family history genetic disorders, cystic fibrosis, SMA, intellectual disability or autism- denies    Most Recent Immunizations   Administered Date(s) Administered     Influenza Vaccine IM > 6 months Valent IIV4 2020     Tdap (Adult) Unspecified Formulation 2014        PMH:   Past Medical History:   Diagnosis Date     NO ACTIVE PROBLEMS        SurgHx:   Past Surgical History:   Procedure Laterality Date     DILATION AND CURETTAGE SUCTION N/A 2020    Procedure: DILATION AND CURETTAGE, UTERUS, USING SUCTION US guidance;  Surgeon: Ekta Hernandez DO;  Location: [facility] OR     NO HISTORY OF SURGERY         FamHx:   Family History   Problem Relation Age of Onset     Alopecia Mother      Cervical Cancer Mother      Asthma Father      No Known Problems Sister      Blood Disease Maternal  "Grandmother      No Known Problems Paternal Grandmother      Heart Disease Paternal Grandfather         \"valve problem\"     No Known Problems Daughter      Asthma Daughter         \"septic\"     No Known Problems Son        SocHx:   Social History     Socioeconomic History     Marital status: Single     Spouse name: Not on file     Number of children: Not on file     Years of education: Not on file     Highest education level: Not on file   Occupational History     Not on file   Social Needs     Financial resource strain: Not on file     Food insecurity:     Worry: Not on file     Inability: Not on file     Transportation needs:     Medical: Not on file     Non-medical: Not on file   Tobacco Use     Smoking status: Current Every Day Smoker     Packs/day: 1.00     Years: 13.00     Pack years: 13.00     Start date: 12/5/2006     Smokeless tobacco: Never Used   Substance and Sexual Activity     Alcohol use: Not Currently     Frequency: Never     Binge frequency: Never     Drug use: Not Currently     Sexual activity: Yes     Partners: Male   Lifestyle     Physical activity:     Days per week: Not on file     Minutes per session: Not on file     Stress: Not on file   Relationships     Social connections:     Talks on phone: Not on file     Gets together: Not on file     Attends [employer] service: Not on file     Active member of club or organization: Not on file     Attends meetings of clubs or organizations: Not on file     Relationship status: Not on file     Intimate partner violence:     Fear of current or ex partner: Not on file     Emotionally abused: Not on file     Physically abused: Not on file     Forced sexual activity: Not on file   Other Topics Concern     Not on file   Social History Narrative    12/2019  Lives in [place] with Murray ROWAN, 2 daughters and 1 son.  Kassandra and Murray smoke.  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   No indoor cats/kittens.   No concerns about domestic violence.   "       Allergies:   Patient has no known allergies.    Medications:   acetaminophen (TYLENOL) 325 MG tablet, Take 2 tablets (650 mg) by mouth every 4 hours as needed for mild pain  etonogestrel (IMPLANON/NEXPLANON) 68 MG IMPL, 1 each (68 mg) by Subdermal route once  acetaminophen (TYLENOL) 500 MG tablet, Take 500-1,000 mg by mouth every 6 hours as needed for mild pain  ondansetron (ZOFRAN-ODT) 4 MG ODT tab, Take 1 tablet (4 mg) by mouth every 8 hours as needed for nausea (Patient not taking: Reported on 1/27/2020)  Prenatal w/o A Vit-Fe Fum-FA (PRENATA PO),   senna-docusate (SENOKOT-S/PERICOLACE) 8.6-50 MG tablet, Take 1-2 tablets by mouth 2 times daily (Patient not taking: Reported on 1/27/2020)    No current facility-administered medications on file prior to visit.       Past medical, surgical, social and family history were reviewed and updated in Epic.      ROS: As described in HPI, otherwise negative for fever/chills, fatigue, dizziness, changes or new deficits in vision, worrisome rashes, new lumps or masses, cough/SOB/CP, GI distress, dysuria, abnormal vaginal discharge, constipation/diarrhea, neurological deficits, or other systemic complaints    EXAM:  Vitals: BP (P) 102/60   Pulse (P) 95   Wt (P) 71.6 kg (157 lb 14.4 oz)   LMP 11/05/2019   BMI (P) 26.28 kg/m    BMI= Body mass index is 26.28 kg/m  (pended).      Gen: Alert, oriented, appropriately interactive, NAD  Chest: Symmetrical, unlabored breathing  Abdomen: soft, non tender, non distended, no masses, no hernias. No inguinal lymphadenopathy.   Pelvic: Deferred  MSK: normal gait, symmetric movements UE & LE  Lower extremities: non-tender, no edema      Labs & Imaging:  Recent Labs   Lab Test 01/20/20 2059   ABO A   RH Pos   AS Neg       SPECIMEN(S):   Products of conception     FINAL DIAGNOSIS:   Products of conception:   - Products of conception identified consisting of immature chorionic   villi, and trophoblasts and degenerating   decidua.   -  No fetal tissue or atypical trophoblastic proliferation is identified.     Electronically signed out by:     DEACON Noel M.D.       ASSESSMENT/PLAN: Kassandra Mead is a 29 year old  s/p SAB at 10w6d w/ retained POC and hemorrhage s/p sD+C on .      ICD-10-CM    1. Postoperative state Z98.890    2. Insertion of implantable subdermal contraceptive Z30.017 etonogestrel (IMPLANON/NEXPLANON) 68 MG IMPL     etonogestrel (IMPLANON/NEXPLANON) subdermal implant 68 mg     INSERTION NON-BIODEGRADABLE DRUG DELIVERY IMPLANT        Doing well post-operatively. Coping well with the loss, however having some difficulty with sleeping and nightmares. She is open to support resources beyond for grief after pregnancy loss, including support groups. We will mail her the pregnancy loss packet today, as none was provided to her in the ER. Reviewed that miscarriage occurs ~ 1 in 5 pregnancy events and that there was no direct event or prevention  that the patient could have avoided or performed.  Discussed that there are many etiologies for miscarriage, the most common being a genetic anomaly.  Reviewed that risk of miscarriage for next pregnancy is not elevated by the current event.    Discussed plans for desire to concieve in the future, and she is interested in Nexplanon implant. She would like to wait at least a few years before considering pregnancy. Nexplanon placed in clinic today without complication. See procedure note below.        Ekta Hernandez, DO        Nexplanon Insertion:    Is a pregnancy test required: No.  Was a consent obtained?  Yes    Subjective: Kassandra Mead is a 29 year old  presents for Nexplanon.    Patient has been given the opportunity to ask questions about all forms of birth control, including all options appropriate for Kassandra Mead. Discussed that no method of birth control, except abstinence is 100% effective against pregnancy or sexually transmitted infection.     Kassandra FRYE  [place] understands she may have the Nexplanon removed at any time and it should be removed by a health care provider.    The entire insertion procedure was reviewed with the patient, including care after placement.    Patient's last menstrual period was 11/05/2019. Last sexual activity: 2 months ago. No allergy to betadine or shellfish. Recent STD screening    BP (P) 102/60   Pulse (P) 95   Wt (P) 71.6 kg (157 lb 14.4 oz)   LMP 11/05/2019   BMI (P) 26.28 kg/m      PROCEDURE NOTE: -- Nexplanon Insertion    Reason for Insertion: contraception    Patient was placed supine with right arm exposed.  Flako was made 8-10 cm from the medial epicondyle, and 3-4cm posteriorly to the sulcus, overlying the triceps muscle, with a guiding flako 4 cm from the first.  Arm was prepped with Betadine. Insertion point was anesthetized with 4 mL 1% lidocaine. After stretching the skin with thumb and index finger around the insertion site, skin punctured with the tip of the needle inserted at 30 degrees and then lowered to horizontal position. The needle was then advanced to its full length. Applicator was then stabilized and slider was unlocked. Slider was pulled back until it stopped and then removed.    Correct placement of the implant was confirmed by palpation in the patient's arm and visualizing the purple top of the obturator.  Patient was also shown location of Nexplanon and palpated last.  Bandage and pressure dressing applied to insertion site.    Lot # [id number]  Exp: [id number]    EBL: minimal    Complications: none    ASSESSMENT:     ICD-10-CM    1. Postoperative state Z98.890    2. Insertion of implantable subdermal contraceptive Z30.017 etonogestrel (IMPLANON/NEXPLANON) 68 MG IMPL     etonogestrel (IMPLANON/NEXPLANON) subdermal implant 68 mg     INSERTION NON-BIODEGRADABLE DRUG DELIVERY IMPLANT        PLAN:    Given 's handouts, including when to have Nexplanon removed, list of danger s/sx, side effects and  follow up recommended. Encouraged condom use for prevention of STD. Back up contraception advised for 7 days. Advised to call for any fever, for prolonged or severe pain or bleeding, abnormal vaginal dischage. She was advised to use pain medications (ibuprofen) as needed for mild to moderate pain.     Ekta Hernandez DO

## 2020-01-28 LAB
ABO + RH BLD: NORMAL
ABO + RH BLD: NORMAL
BLD GP AB SCN SERPL QL: NORMAL
BLD PROD TYP BPU: NORMAL
BLOOD BANK CMNT PATIENT-IMP: NORMAL
NUM BPU REQUESTED: 1
SPECIMEN EXP DATE BLD: NORMAL

## 2020-02-18 ENCOUNTER — OFFICE VISIT (OUTPATIENT)
Dept: URGENT CARE | Facility: RETAIL CLINIC | Age: 30
End: 2020-02-18
Payer: COMMERCIAL

## 2020-02-18 VITALS
OXYGEN SATURATION: 97 % | HEART RATE: 92 BPM | SYSTOLIC BLOOD PRESSURE: 111 MMHG | DIASTOLIC BLOOD PRESSURE: 71 MMHG | TEMPERATURE: 99 F

## 2020-02-18 DIAGNOSIS — J20.9 ACUTE BRONCHITIS WITH COEXISTING CONDITION REQUIRING PROPHYLACTIC TREATMENT: Primary | ICD-10-CM

## 2020-02-18 PROCEDURE — 99213 OFFICE O/P EST LOW 20 MIN: CPT | Performed by: FAMILY MEDICINE

## 2020-02-18 RX ORDER — ALBUTEROL SULFATE 90 UG/1
1-2 AEROSOL, METERED RESPIRATORY (INHALATION) EVERY 4 HOURS PRN
Qty: 1 INHALER | Refills: 0 | Status: SHIPPED | OUTPATIENT
Start: 2020-02-18 | End: 2021-03-08

## 2020-02-18 RX ORDER — AZITHROMYCIN 250 MG/1
TABLET, FILM COATED ORAL
Qty: 6 TABLET | Refills: 0 | Status: SHIPPED | OUTPATIENT
Start: 2020-02-18 | End: 2021-03-08

## 2020-02-18 NOTE — PROGRESS NOTES
SUBJECTIVE:  Kassandra Mead is a 29 year old female who presents to the clinic today with a chief complaint of cough  for 3 day(s).  Her cough is described as productive of yellow sputum.    The patient's symptoms are moderate and worsening.  Associated symptoms include malaise. The patient's symptoms are exacerbated by exercise and lying down  Patient has been using nothing  to improve symptoms. Cigarette smoker with hx of recurrent bronchitis.  Recent miscarriage.    Past Medical History:   Diagnosis Date     NO ACTIVE PROBLEMS      Current Outpatient Medications   Medication Sig Dispense Refill     albuterol 108 (90 Base) MCG/ACT IN inhaler Inhale 1-2 puffs into the lungs every 4 hours as needed for shortness of breath / dyspnea or wheezing 1 Inhaler 0     azithromycin (ZITHROMAX Z-HANK) 250 MG PO tablet 2 tabs day one, then 1 tab daily 6 tablet 0     acetaminophen (TYLENOL) 325 MG tablet Take 2 tablets (650 mg) by mouth every 4 hours as needed for mild pain (Patient not taking: Reported on 2/18/2020) 50 tablet 0     acetaminophen (TYLENOL) 500 MG tablet Take 500-1,000 mg by mouth every 6 hours as needed for mild pain       etonogestrel (IMPLANON/NEXPLANON) 68 MG IMPL 1 each (68 mg) by Subdermal route once       Prenatal w/o A Vit-Fe Fum-FA (PRENATA PO)        History   Smoking Status     Current Every Day Smoker     Packs/day: 0.50     Years: 13.00     Start date: 12/5/2006   Smokeless Tobacco     Never Used       ROS  Review of systems negative except as stated above.    OBJECTIVE:  /71   Pulse 92   Temp 99  F (37.2  C) (Temporal)   SpO2 97%   GENERAL APPEARANCE: healthy, alert and no distress  EYES: EOMI,  PERRL, conjunctiva clear  HENT: ear canals and TM's normal.  Nose and mouth without ulcers, erythema or lesions  NECK: supple, nontender, no lymphadenopathy  RESP: rhonchi throughout  CV: regular rates and rhythm, normal S1 S2, no murmur noted  ABDOMEN:  soft, nontender, no HSM or masses and bowel  sounds normal  NEURO: Normal strength and tone, sensory exam grossly normal,  normal speech and mentation  SKIN: no suspicious lesions or rashes    ASSESSMENT:    Acute Bronchitis  Cigarette smoker    PLAN:  Zithromax, quit smoking. Patient did have flu shot and I congratulated her on that.  Symptomatic measures encouraged, humidified air, plenty of fluids.  Follow up with primary care provider if no improvement.

## 2020-03-11 ENCOUNTER — HEALTH MAINTENANCE LETTER (OUTPATIENT)
Age: 30
End: 2020-03-11

## 2021-01-04 ENCOUNTER — HEALTH MAINTENANCE LETTER (OUTPATIENT)
Age: 31
End: 2021-01-04

## 2021-02-23 DIAGNOSIS — R42 LIGHTHEADEDNESS: ICD-10-CM

## 2021-02-23 DIAGNOSIS — D72.829 LEUKOCYTOSIS, UNSPECIFIED TYPE: ICD-10-CM

## 2021-02-23 LAB
ALBUMIN SERPL-MCNC: 3.2 G/DL (ref 3.4–5)
ALBUMIN UR-MCNC: NEGATIVE MG/DL
ALP SERPL-CCNC: 59 U/L (ref 40–150)
ALT SERPL W P-5'-P-CCNC: 24 U/L (ref 0–50)
ANION GAP SERPL CALCULATED.3IONS-SCNC: 3 MMOL/L (ref 3–14)
APPEARANCE UR: CLEAR
AST SERPL W P-5'-P-CCNC: 11 U/L (ref 0–45)
BASOPHILS # BLD AUTO: 0.1 10E9/L (ref 0–0.2)
BASOPHILS NFR BLD AUTO: 0.7 %
BILIRUB SERPL-MCNC: 0.2 MG/DL (ref 0.2–1.3)
BILIRUB UR QL STRIP: NEGATIVE
BUN SERPL-MCNC: 8 MG/DL (ref 7–30)
CALCIUM SERPL-MCNC: 8.5 MG/DL (ref 8.5–10.1)
CHLORIDE SERPL-SCNC: 109 MMOL/L (ref 94–109)
CO2 SERPL-SCNC: 28 MMOL/L (ref 20–32)
COLOR UR AUTO: COLORLESS
CREAT SERPL-MCNC: 0.67 MG/DL (ref 0.52–1.04)
DIFFERENTIAL METHOD BLD: ABNORMAL
EOSINOPHIL NFR BLD AUTO: 3.4 %
ERYTHROCYTE [DISTWIDTH] IN BLOOD BY AUTOMATED COUNT: 12.1 % (ref 10–15)
GFR SERPL CREATININE-BSD FRML MDRD: >90 ML/MIN/{1.73_M2}
GLUCOSE SERPL-MCNC: 84 MG/DL (ref 70–99)
GLUCOSE UR STRIP-MCNC: NEGATIVE MG/DL
HCT VFR BLD AUTO: 47 % (ref 35–47)
HGB BLD-MCNC: 16.3 G/DL (ref 11.7–15.7)
HGB UR QL STRIP: NEGATIVE
IMM GRANULOCYTES # BLD: 0 10E9/L (ref 0–0.4)
IMM GRANULOCYTES NFR BLD: 0.1 %
IRON SATN MFR SERPL: 29 % (ref 15–46)
IRON SERPL-MCNC: 82 UG/DL (ref 35–180)
KETONES UR STRIP-MCNC: NEGATIVE MG/DL
LEUKOCYTE ESTERASE UR QL STRIP: NEGATIVE
LYMPHOCYTES # BLD AUTO: 2.5 10E9/L (ref 0.8–5.3)
LYMPHOCYTES NFR BLD AUTO: 33.6 %
MCH RBC QN AUTO: 32.1 PG (ref 26.5–33)
MCHC RBC AUTO-ENTMCNC: 34.7 G/DL (ref 31.5–36.5)
MCV RBC AUTO: 93 FL (ref 78–100)
MONOCYTES # BLD AUTO: 0.5 10E9/L (ref 0–1.3)
MONOCYTES NFR BLD AUTO: 6.7 %
NEUTROPHILS # BLD AUTO: 4.2 10E9/L (ref 1.6–8.3)
NEUTROPHILS NFR BLD AUTO: 55.5 %
NITRATE UR QL: NEGATIVE
NRBC # BLD AUTO: 0 10*3/UL
NRBC BLD AUTO-RTO: 0 /100
PH UR STRIP: 6 PH (ref 5–7)
PLATELET # BLD AUTO: 324 10E9/L (ref 150–450)
POTASSIUM SERPL-SCNC: 3.6 MMOL/L (ref 3.4–5.3)
PROT SERPL-MCNC: 6.8 G/DL (ref 6.8–8.8)
RBC # BLD AUTO: 5.08 10E12/L (ref 3.8–5.2)
RBC #/AREA URNS AUTO: 0 /HPF (ref 0–2)
SODIUM SERPL-SCNC: 140 MMOL/L (ref 133–144)
SOURCE: ABNORMAL
SP GR UR STRIP: 1 (ref 1–1.03)
SQUAMOUS #/AREA URNS AUTO: <1 /HPF (ref 0–1)
TIBC SERPL-MCNC: 281 UG/DL (ref 240–430)
TSH SERPL DL<=0.005 MIU/L-ACNC: 1.31 MU/L (ref 0.4–4)
UROBILINOGEN UR STRIP-MCNC: 0 MG/DL (ref 0–2)
WBC # BLD AUTO: 7.6 10E9/L (ref 4–11)
WBC #/AREA URNS AUTO: <1 /HPF (ref 0–5)

## 2021-02-23 PROCEDURE — 80050 GENERAL HEALTH PANEL: CPT | Performed by: NURSE PRACTITIONER

## 2021-02-23 PROCEDURE — 81001 URINALYSIS AUTO W/SCOPE: CPT | Performed by: PHYSICIAN ASSISTANT

## 2021-02-23 PROCEDURE — 83540 ASSAY OF IRON: CPT | Performed by: NURSE PRACTITIONER

## 2021-02-23 PROCEDURE — 36415 COLL VENOUS BLD VENIPUNCTURE: CPT | Performed by: NURSE PRACTITIONER

## 2021-02-23 PROCEDURE — 83550 IRON BINDING TEST: CPT | Performed by: NURSE PRACTITIONER

## 2021-03-01 NOTE — PATIENT INSTRUCTIONS
Start birth control pills for the next 3 months - this will hopefully help settle the breakthrough bleeding you are having   - If not improving we might want to consider a different form of birth control    Start Buspirone (Buspar) - take 1/2-1 tablet three times daily as needed for anxiety   - Keep me posted how this is doing    Start Clindamycin gel topically once daily for acne    Try mouth guard and chiropractor for TMJ      Preventive Health Recommendations  Female Ages 26 - 39  Yearly exam:   See your health care provider every year in order to    Review health changes.     Discuss preventive care.      Review your medicines if you your doctor has prescribed any.    Until age 30: Get a Pap test every three years (more often if you have had an abnormal result).    After age 30: Talk to your doctor about whether you should have a Pap test every 3 years or have a Pap test with HPV screening every 5 years.   You do not need a Pap test if your uterus was removed (hysterectomy) and you have not had cancer.  You should be tested each year for STDs (sexually transmitted diseases), if you're at risk.   Talk to your provider about how often to have your cholesterol checked.  If you are at risk for diabetes, you should have a diabetes test (fasting glucose).  Shots: Get a flu shot each year. Get a tetanus shot every 10 years.   Nutrition:     Eat at least 5 servings of fruits and vegetables each day.    Eat whole-grain bread, whole-wheat pasta and brown rice instead of white grains and rice.    Get adequate Calcium and Vitamin D.     Lifestyle    Exercise at least 150 minutes a week (30 minutes a day, 5 days of the week). This will help you control your weight and prevent disease.    Limit alcohol to one drink per day.    No smoking.     Wear sunscreen to prevent skin cancer.    See your dentist every six months for an exam and cleaning.

## 2021-03-01 NOTE — PROGRESS NOTES
SUBJECTIVE:   CC: Kassandra Mead is an 30 year old woman who presents for preventive health visit.       Patient has been advised of split billing requirements and indicates understanding: Yes  Healthy Habits:     Getting at least 3 servings of Calcium per day:  NO    Bi-annual eye exam:  Yes    Dental care twice a year:  Yes    Sleep apnea or symptoms of sleep apnea:  None    Diet:  Carbohydrate counting    Frequency of exercise:  None    Taking medications regularly:  Yes    Medication side effects:  None    PHQ-2 Total Score: 0    Additional concerns today:  Yes    Concerns as provided in previous [employer] message:  I have the birth control in my arm- after my miscarriage last year- I do not think it s working how it should. I am getting my period very heavy for a week straight, and than it will come come back a couple days later and I ll have it again for another week. So I am having my period for at least two weeks out of the month. I m not 100% if it s caused by the birth control or another issue or if this is why I am getting light headed and dizzy.  2nd- Anxiety... this has been something I know I have always had, but have never said anything. My mom, my sister, they are both on medication & said it s time I say something as I m always worked up- or worried about something. I have been having heart palpitations (I think that s what it s called) and I think it s from being so worked up to the point I m almost putting myself in a panic attack. 3rd- Never being able to focus- I am always all over the place. My boss told me to talk to you about possible ADHD or something along those lines. I will start something, and get side tracked and start doing something else, than it s all down hill from there. As you can see I am rambling on this message (which I apologize!) 4th-   I have gotten acne on my butt since I can remember, but it wasn t bad and would just go away- my dad has it as well, but lately there are  times where it s like a small boil and very painful. I put a bandage on top until it drains and it seems to work, but it s VERY uncomfortable & really embarrassing for me to talk about, even though I know I cannot help it. Wondering if there is a cream or something that could help with this? Last but not least- my Jaw. It cracks all the time, and always has. I cannot remember if it was my dentist or my OB (when I was pregnant) told me when it starts to get painful- it could mean it s bone on bone and that s when I would see a specialist or an OT (I m not exactly sure) - I m not sure if you can even do anything about it- but I wanted to put it out there in case you can do a referral or if that would be from the dentist. Sorry again for this long message and to bother you again before even meeting me but I know I wouldn t have brought any of it up at our appointment together on the 8th, so I needed to do it now.     Today's PHQ-2 Score:   PHQ-2 ( 1999 [employer]) 3/2/2021   Q1: Little interest or pleasure in doing things 0   Q2: Feeling down, depressed or hopeless 0   PHQ-2 Score 0   Q1: Little interest or pleasure in doing things Not at all   Q2: Feeling down, depressed or hopeless Not at all   PHQ-2 Score 0       Abuse: Current or Past (Physical, Sexual or Emotional) - No  Do you feel safe in your environment? Yes    Have you ever done Advance Care Planning? (For example, a Health Directive, POLST, or a discussion with a medical provider or your loved ones about your wishes): No, advance care planning information given to patient to review.  Patient plans to discuss their wishes with loved ones or provider.      Social History     Tobacco Use     Smoking status: Current Every Day Smoker     Packs/day: 1.00     Years: 13.00     Pack years: 13.00     Start date: 12/5/2006     Smokeless tobacco: Never Used   Substance Use Topics     Alcohol use: Not Currently     Frequency: Never     Binge frequency: Never     If you drink  "alcohol do you typically have >3 drinks per day or >7 drinks per week? No    Alcohol Use 3/2/2021   Prescreen: >3 drinks/day or >7 drinks/week? Not Applicable   No flowsheet data found.    Any new diagnosis of family breast, ovarian, or bowel cancer? No     Reviewed orders with patient.  Reviewed health maintenance and updated orders accordingly - Yes  BP Readings from Last 3 Encounters:   21 98/66   20 111/71   20 (P) 102/60    Wt Readings from Last 3 Encounters:   21 71.7 kg (158 lb)   20 (P) 71.6 kg (157 lb 14.4 oz)   20 73.2 kg (161 lb 4.8 oz)                  Patient Active Problem List   Diagnosis     Tobacco use disorder     Incomplete  with delayed or excessive hemorrhage     Past Surgical History:   Procedure Laterality Date     DILATION AND CURETTAGE SUCTION N/A 2020    Procedure: DILATION AND CURETTAGE, UTERUS, USING SUCTION US guidance;  Surgeon: Ekta Hernandez DO;  Location:  OR       Social History     Tobacco Use     Smoking status: Current Every Day Smoker     Packs/day: 1.00     Years: 13.00     Pack years: 13.00     Start date: 2006     Smokeless tobacco: Never Used   Substance Use Topics     Alcohol use: Not Currently     Frequency: Never     Binge frequency: Never     Family History   Problem Relation Age of Onset     Alopecia Mother      Cervical Cancer Mother      Alcoholism Mother      Anxiety Disorder Mother      Asthma Father      Bronchitis Father      Diabetes Father         Pre-diabetes     Anxiety Disorder Sister      Blood Disease Maternal Grandmother         polycythemia vera     No Known Problems Paternal Grandmother      Heart Disease Paternal Grandfather         \"valve problem\"     No Known Problems Daughter      Asthma Daughter         \"septic\"     No Known Problems Son          Current Outpatient Medications   Medication Sig Dispense Refill     albuterol (PROAIR HFA/PROVENTIL HFA/VENTOLIN HFA) 108 (90 Base) MCG/ACT " inhaler Inhale 1-2 puffs into the lungs every 4 hours as needed for shortness of breath / dyspnea or wheezing 1 Inhaler 0     busPIRone (BUSPAR) 5 MG tablet Take 1 tablet (5 mg) by mouth 3 times daily 90 tablet 1     clindamycin (CLINDAMAX) 1 % external gel Apply topically 2 times daily 60 g 11     norgestimate-ethinyl estradiol (ORTHO-CYCLEN) 0.25-35 MG-MCG tablet Take 1 tablet by mouth daily 84 tablet 0     etonogestrel (IMPLANON/NEXPLANON) 68 MG IMPL 1 each (68 mg) by Subdermal route once       Prenatal w/o A Vit-Fe Fum-FA (PRENATA PO)        No Known Allergies    Breast CA Risk Screening:  Breast CA Risk Assessment (FHS-7) 3/2/2021   Do you have a family history of breast, colon, or ovarian cancer? No / Unknown     Patient under 40 years of age: Routine Mammogram Screening not recommended.   Pertinent mammograms are reviewed under the imaging tab.    History of abnormal Pap smear: NO - age 21-29 PAP every 3 years recommended     Reviewed and updated as needed this visit by clinical staff  Tobacco    Problems   Surg Hx  Fam Hx          Reviewed and updated as needed this visit by Provider  Tobacco    Problems   Surg Hx  Fam Hx             Review of Systems   Constitutional: Negative for chills and fever.   HENT: Negative for congestion, ear pain, hearing loss and sore throat.    Eyes: Negative for pain and visual disturbance.   Respiratory: Negative for cough and shortness of breath.    Cardiovascular: Negative for chest pain, palpitations and peripheral edema.   Gastrointestinal: Negative for abdominal pain, constipation, diarrhea, heartburn, hematochezia and nausea.   Breasts:  Negative for tenderness, breast mass and discharge.   Genitourinary: Positive for frequency. Negative for dysuria, genital sores, hematuria, pelvic pain, urgency, vaginal bleeding and vaginal discharge.   Musculoskeletal: Negative for arthralgias, joint swelling and myalgias.   Skin: Negative for rash.   Neurological: Negative  "for dizziness, weakness, headaches and paresthesias.   Psychiatric/Behavioral: Negative for mood changes. The patient is nervous/anxious.         OBJECTIVE:   BP 98/66   Pulse 70   Temp 98.4  F (36.9  C) (Temporal)   Resp 18   Ht 1.638 m (5' 4.5\")   Wt 71.7 kg (158 lb)   LMP 03/01/2021   BMI 26.70 kg/m    Physical Exam  GENERAL: healthy, alert and no distress  EYES: Eyes grossly normal to inspection, PERRL and conjunctivae and sclerae normal  HENT: ear canals and TM's normal, nose and mouth without ulcers or lesions  NECK: no adenopathy, no asymmetry, masses, or scars and thyroid normal to palpation  RESP: lungs clear to auscultation - no rales, rhonchi or wheezes  CV: regular rate and rhythm, normal S1 S2, no S3 or S4, no murmur, click or rub, no peripheral edema and peripheral pulses strong  ABDOMEN: soft, nontender, no hepatosplenomegaly, no masses and bowel sounds normal   (female): normal female external genitalia, normal urethral meatus, vaginal mucosa pink, moist, well rugated, and normal cervix/adnexa/uterus without masses or discharge  MS: no gross musculoskeletal defects noted, no edema  SKIN: no suspicious lesions or rashes  NEURO: Normal strength and tone, mentation intact and speech normal  PSYCH: mentation appears normal, affect normal/bright    Diagnostic Test Results:  Labs reviewed in Epic  No results found for any visits on 03/08/21.    ASSESSMENT/PLAN:   1. Routine general medical examination at a health care facility    2. Screening for malignant neoplasm of cervix  - Pap imaged thin layer screen reflex to HPV if ASCUS - recommend age 25 - 29    3. Breakthrough bleeding on Nexplanon  Will have patient trial 3 month course of OCP's. Discussed if this does not resolve her bleeding we should consider a different method of birth control.   - norgestimate-ethinyl estradiol (ORTHO-CYCLEN) 0.25-35 MG-MCG tablet; Take 1 tablet by mouth daily  Dispense: 84 tablet; Refill: 0    4. KEMAR (generalized " "anxiety disorder)  Anxiety discussed today. This has been worsening as she is dealing with family stressors. She reports both mom and sister have taken Buspar with good effect. Will start this as below. Discussed appropriate use and side effects. Dose adjustment pending response.  - busPIRone (BUSPAR) 5 MG tablet; Take 1 tablet (5 mg) by mouth 3 times daily  Dispense: 90 tablet; Refill: 1    5. Acute bronchitis with coexisting condition requiring prophylactic treatment  Use as needed for acute bronchitis flares.   - albuterol (PROAIR HFA/PROVENTIL HFA/VENTOLIN HFA) 108 (90 Base) MCG/ACT inhaler; Inhale 1-2 puffs into the lungs every 4 hours as needed for shortness of breath / dyspnea or wheezing  Dispense: 1 Inhaler; Refill: 0    6. Tobacco use disorder  Encouraged smoking cessation. Patient declines any assistance with this today.     7. Acne, unspecified acne type  Will have patient apply Clindamycin daily. Main area of concern is her buttocks.   - clindamycin (CLINDAMAX) 1 % external gel; Apply topically 2 times daily  Dispense: 60 g; Refill: 11    Patient was having lightheadedness and labs were normal. Was doing keto at that time. Symptoms since improved.     Patient has been advised of split billing requirements and indicates understanding: Yes     COUNSELING:  Reviewed preventive health counseling, as reflected in patient instructions    Estimated body mass index is 26.7 kg/m  as calculated from the following:    Height as of this encounter: 1.638 m (5' 4.5\").    Weight as of this encounter: 71.7 kg (158 lb).    Weight management plan: Discussed healthy diet and exercise guidelines    She reports that she has been smoking. She started smoking about 14 years ago. She has a 13.00 pack-year smoking history. She has never used smokeless tobacco.  Tobacco Cessation Action Plan:   Information offered: Patient not interested at this time      Counseling Resources:  ATP IV Guidelines  Pooled Cohorts Equation " Calculator  Breast Cancer Risk Calculator  BRCA-Related Cancer Risk Assessment: FHS-7 Tool  FRAX Risk Assessment  ICSI Preventive Guidelines  Dietary Guidelines for Americans, 2010  USDA's MyPlate  ASA Prophylaxis  Lung CA Screening    [facility]

## 2021-03-02 ASSESSMENT — ENCOUNTER SYMPTOMS
WEAKNESS: 0
HEMATURIA: 0
CONSTIPATION: 0
SHORTNESS OF BREATH: 0
DIARRHEA: 0
HEMATOCHEZIA: 0
JOINT SWELLING: 0
CHILLS: 0
PALPITATIONS: 0
COUGH: 0
ARTHRALGIAS: 0
MYALGIAS: 0
DIZZINESS: 0
BREAST MASS: 0
NERVOUS/ANXIOUS: 1
ABDOMINAL PAIN: 0
HEARTBURN: 0
SORE THROAT: 0
HEADACHES: 0
FEVER: 0
FREQUENCY: 1
PARESTHESIAS: 0
EYE PAIN: 0
DYSURIA: 0
NAUSEA: 0

## 2021-03-08 ENCOUNTER — OFFICE VISIT (OUTPATIENT)
Dept: FAMILY MEDICINE | Facility: CLINIC | Age: 31
End: 2021-03-08
Payer: COMMERCIAL

## 2021-03-08 VITALS
RESPIRATION RATE: 18 BRPM | WEIGHT: 158 LBS | HEIGHT: 65 IN | TEMPERATURE: 98.4 F | DIASTOLIC BLOOD PRESSURE: 66 MMHG | SYSTOLIC BLOOD PRESSURE: 98 MMHG | HEART RATE: 70 BPM | BODY MASS INDEX: 26.33 KG/M2

## 2021-03-08 DIAGNOSIS — F17.200 TOBACCO USE DISORDER: ICD-10-CM

## 2021-03-08 DIAGNOSIS — Z97.5 BREAKTHROUGH BLEEDING ON NEXPLANON: ICD-10-CM

## 2021-03-08 DIAGNOSIS — N92.1 BREAKTHROUGH BLEEDING ON NEXPLANON: ICD-10-CM

## 2021-03-08 DIAGNOSIS — L70.9 ACNE, UNSPECIFIED ACNE TYPE: ICD-10-CM

## 2021-03-08 DIAGNOSIS — Z00.00 ROUTINE GENERAL MEDICAL EXAMINATION AT A HEALTH CARE FACILITY: Primary | ICD-10-CM

## 2021-03-08 DIAGNOSIS — Z12.4 SCREENING FOR MALIGNANT NEOPLASM OF CERVIX: ICD-10-CM

## 2021-03-08 DIAGNOSIS — J20.9 ACUTE BRONCHITIS WITH COEXISTING CONDITION REQUIRING PROPHYLACTIC TREATMENT: ICD-10-CM

## 2021-03-08 DIAGNOSIS — F41.1 GAD (GENERALIZED ANXIETY DISORDER): ICD-10-CM

## 2021-03-08 PROCEDURE — 99214 OFFICE O/P EST MOD 30 MIN: CPT | Mod: 25 | Performed by: PHYSICIAN ASSISTANT

## 2021-03-08 PROCEDURE — 99395 PREV VISIT EST AGE 18-39: CPT | Performed by: PHYSICIAN ASSISTANT

## 2021-03-08 PROCEDURE — G0145 SCR C/V CYTO,THINLAYER,RESCR: HCPCS | Performed by: PHYSICIAN ASSISTANT

## 2021-03-08 RX ORDER — NORGESTIMATE AND ETHINYL ESTRADIOL 0.25-0.035
1 KIT ORAL DAILY
Qty: 84 TABLET | Refills: 0 | Status: SHIPPED | OUTPATIENT
Start: 2021-03-08 | End: 2022-02-07

## 2021-03-08 RX ORDER — BUSPIRONE HYDROCHLORIDE 5 MG/1
5 TABLET ORAL 3 TIMES DAILY
Qty: 90 TABLET | Refills: 1 | Status: SHIPPED | OUTPATIENT
Start: 2021-03-08 | End: 2021-09-24

## 2021-03-08 RX ORDER — ALBUTEROL SULFATE 90 UG/1
1-2 AEROSOL, METERED RESPIRATORY (INHALATION) EVERY 4 HOURS PRN
Qty: 1 INHALER | Refills: 0 | Status: SHIPPED | OUTPATIENT
Start: 2021-03-08 | End: 2021-12-22

## 2021-03-08 RX ORDER — CLINDAMYCIN PHOSPHATE 10 MG/G
GEL TOPICAL 2 TIMES DAILY
Qty: 60 G | Refills: 11 | Status: SHIPPED | OUTPATIENT
Start: 2021-03-08 | End: 2023-01-19

## 2021-03-08 ASSESSMENT — ENCOUNTER SYMPTOMS
JOINT SWELLING: 0
SHORTNESS OF BREATH: 0
CHILLS: 0
ABDOMINAL PAIN: 0
SORE THROAT: 0
HEMATURIA: 0
FREQUENCY: 1
DYSURIA: 0
DIZZINESS: 0
PALPITATIONS: 0
NERVOUS/ANXIOUS: 1
MYALGIAS: 0
DIARRHEA: 0
CONSTIPATION: 0
HEMATOCHEZIA: 0
NAUSEA: 0
ARTHRALGIAS: 0
COUGH: 0
HEADACHES: 0
FEVER: 0
EYE PAIN: 0
WEAKNESS: 0
PARESTHESIAS: 0
BREAST MASS: 0
HEARTBURN: 0

## 2021-03-08 ASSESSMENT — MIFFLIN-ST. JEOR: SCORE: 1434.62

## 2021-03-10 LAB
COPATH REPORT: NORMAL
PAP: NORMAL

## 2021-03-20 ENCOUNTER — MYC MEDICAL ADVICE (OUTPATIENT)
Dept: FAMILY MEDICINE | Facility: CLINIC | Age: 31
End: 2021-03-20

## 2021-03-20 DIAGNOSIS — Z71.6 ENCOUNTER FOR TOBACCO USE CESSATION COUNSELING: Primary | ICD-10-CM

## 2021-03-22 RX ORDER — VARENICLINE TARTRATE 1 MG/1
1 TABLET, FILM COATED ORAL 2 TIMES DAILY
Qty: 60 TABLET | Refills: 1 | Status: SHIPPED | OUTPATIENT
Start: 2021-03-22 | End: 2021-09-24

## 2021-03-26 NOTE — TELEPHONE ENCOUNTER
Patient is given information on close contact per CDC.  Closing this encounter.  Afia Durand, BSN, RN

## 2021-04-07 ENCOUNTER — MYC MEDICAL ADVICE (OUTPATIENT)
Dept: FAMILY MEDICINE | Facility: CLINIC | Age: 31
End: 2021-04-07

## 2021-04-08 ENCOUNTER — ALLIED HEALTH/NURSE VISIT (OUTPATIENT)
Dept: FAMILY MEDICINE | Facility: CLINIC | Age: 31
End: 2021-04-08
Payer: COMMERCIAL

## 2021-04-08 ENCOUNTER — E-VISIT (OUTPATIENT)
Dept: FAMILY MEDICINE | Facility: CLINIC | Age: 31
End: 2021-04-08
Payer: COMMERCIAL

## 2021-04-08 ENCOUNTER — MYC MEDICAL ADVICE (OUTPATIENT)
Dept: FAMILY MEDICINE | Facility: CLINIC | Age: 31
End: 2021-04-08

## 2021-04-08 DIAGNOSIS — J02.9 SORE THROAT: ICD-10-CM

## 2021-04-08 DIAGNOSIS — J02.9 SORE THROAT: Primary | ICD-10-CM

## 2021-04-08 LAB
DEPRECATED S PYO AG THROAT QL EIA: NEGATIVE
SPECIMEN SOURCE: NORMAL
SPECIMEN SOURCE: NORMAL
STREP GROUP A PCR: NOT DETECTED

## 2021-04-08 PROCEDURE — 99N1174 PR STATISTIC STREP A RAPID: Performed by: PHYSICIAN ASSISTANT

## 2021-04-08 PROCEDURE — 87651 STREP A DNA AMP PROBE: CPT | Performed by: PHYSICIAN ASSISTANT

## 2021-04-08 PROCEDURE — 99207 PR NO CHARGE NURSE ONLY: CPT

## 2021-04-08 PROCEDURE — 99421 OL DIG E/M SVC 5-10 MIN: CPT | Performed by: PHYSICIAN ASSISTANT

## 2021-04-08 NOTE — TELEPHONE ENCOUNTER
Please give patient instructions to submit an E-visit and can get orders placed for strep testing.     Yanci Patterson PA-C

## 2021-04-26 ENCOUNTER — MYC MEDICAL ADVICE (OUTPATIENT)
Dept: FAMILY MEDICINE | Facility: CLINIC | Age: 31
End: 2021-04-26

## 2021-04-26 DIAGNOSIS — R21 RASH: Primary | ICD-10-CM

## 2021-04-28 RX ORDER — PREDNISONE 20 MG/1
40 TABLET ORAL DAILY
Qty: 10 TABLET | Refills: 0 | Status: SHIPPED | OUTPATIENT
Start: 2021-04-28 | End: 2021-05-03

## 2021-08-27 ENCOUNTER — HOSPITAL ENCOUNTER (OUTPATIENT)
Dept: CARDIOLOGY | Facility: CLINIC | Age: 31
Discharge: HOME | End: 2021-08-27
Attending: PHYSICIAN ASSISTANT | Admitting: PHYSICIAN ASSISTANT
Payer: COMMERCIAL

## 2021-08-27 DIAGNOSIS — R00.2 HEART PALPITATIONS: ICD-10-CM

## 2021-08-27 DIAGNOSIS — R42 DIZZINESS: ICD-10-CM

## 2021-08-27 PROCEDURE — 93242 EXT ECG>48HR<7D RECORDING: CPT

## 2021-09-15 ENCOUNTER — TELEPHONE (OUTPATIENT)
Dept: FAMILY MEDICINE | Facility: CLINIC | Age: 31
End: 2021-09-15

## 2021-09-15 NOTE — RESULT ENCOUNTER NOTE
Please notify patient /parent that her holter monitor showed a few runs of very mild ventricular tachycardia (faster than normal heart rate). This is what was occurring when she pushed the button. If symptoms continue to be bothersome we can consider trial of a beta blocker to control rate. This is otherwise something that we can continue to monitor.    Yanci Patterson PA-C

## 2021-09-15 NOTE — TELEPHONE ENCOUNTER
Patient was informed that her holter monitor showed a few runs of very mild ventricular tachycardia which is faster than normal heart rate. This is what was occurring when you pushed the button. If symptoms continue to be bothersome Yanci can consider trial of a beta blocker to control rate. This is otherwise something that patient can continue to monitor and if it continues or seems to get worse to the patient let Yanci Patterson know.    Naomi House, CMA

## 2021-10-10 ENCOUNTER — HEALTH MAINTENANCE LETTER (OUTPATIENT)
Age: 31
End: 2021-10-10

## 2021-11-05 ENCOUNTER — MYC MEDICAL ADVICE (OUTPATIENT)
Dept: FAMILY MEDICINE | Facility: CLINIC | Age: 31
End: 2021-11-05

## 2021-12-22 ENCOUNTER — E-VISIT (OUTPATIENT)
Dept: FAMILY MEDICINE | Facility: OTHER | Age: 31
End: 2021-12-22
Payer: COMMERCIAL

## 2021-12-22 DIAGNOSIS — J20.9 ACUTE BRONCHITIS, UNSPECIFIED ORGANISM: Primary | ICD-10-CM

## 2021-12-22 DIAGNOSIS — J20.9 ACUTE BRONCHITIS WITH COEXISTING CONDITION REQUIRING PROPHYLACTIC TREATMENT: ICD-10-CM

## 2021-12-22 PROCEDURE — 99421 OL DIG E/M SVC 5-10 MIN: CPT | Performed by: PHYSICIAN ASSISTANT

## 2021-12-22 RX ORDER — AZITHROMYCIN 250 MG/1
TABLET, FILM COATED ORAL
Qty: 6 TABLET | Refills: 0 | Status: SHIPPED | OUTPATIENT
Start: 2021-12-22 | End: 2021-12-27

## 2021-12-22 RX ORDER — ALBUTEROL SULFATE 90 UG/1
1-2 AEROSOL, METERED RESPIRATORY (INHALATION) EVERY 4 HOURS PRN
Qty: 18 G | Refills: 0 | Status: SHIPPED | OUTPATIENT
Start: 2021-12-22 | End: 2023-01-19

## 2021-12-22 RX ORDER — PREDNISONE 20 MG/1
40 TABLET ORAL DAILY
Qty: 10 TABLET | Refills: 0 | Status: SHIPPED | OUTPATIENT
Start: 2021-12-22 | End: 2021-12-27

## 2021-12-22 NOTE — PATIENT INSTRUCTIONS
"    Dear Kassandra Mead    After reviewing your responses, I've been able to diagnose you with \"Bronchitis\" which is a common infection of your lungs. While this is most commonly caused by a virus, the symptoms you have given suggest you should be treated with antibiotics.     I have sent Azithromycin to your pharmacy to treat this infection.     It is important that you take all of your prescribed medication even if your symptoms are improving after a few doses. Taking all of your medicine helps prevent the symptoms from returning.     If your symptoms worsen, you develop chest pain or shortness of breath, fevers over 101, or are not improving in 5 days, please contact your primary care provider for an appointment or visit any of our convenient Walk-in Care or Urgent Care Centers to be seen which can be found on our website here.    Thanks again for choosing us as your health care partner,    Yanci Patterson PA-C    "

## 2022-01-26 ENCOUNTER — MYC MEDICAL ADVICE (OUTPATIENT)
Dept: FAMILY MEDICINE | Facility: CLINIC | Age: 32
End: 2022-01-26
Payer: COMMERCIAL

## 2022-01-26 DIAGNOSIS — R05.9 COUGH: Primary | ICD-10-CM

## 2022-01-31 RX ORDER — BENZONATATE 100 MG/1
100 CAPSULE ORAL 3 TIMES DAILY PRN
Qty: 60 CAPSULE | Refills: 0 | Status: SHIPPED | OUTPATIENT
Start: 2022-01-31 | End: 2023-01-19

## 2022-01-31 RX ORDER — ALBUTEROL SULFATE 90 UG/1
2 AEROSOL, METERED RESPIRATORY (INHALATION) EVERY 6 HOURS
Qty: 18 G | Refills: 1 | Status: SHIPPED | OUTPATIENT
Start: 2022-01-31 | End: 2023-09-15

## 2022-02-07 ENCOUNTER — OFFICE VISIT (OUTPATIENT)
Dept: FAMILY MEDICINE | Facility: CLINIC | Age: 32
End: 2022-02-07
Payer: COMMERCIAL

## 2022-02-07 VITALS
SYSTOLIC BLOOD PRESSURE: 118 MMHG | DIASTOLIC BLOOD PRESSURE: 70 MMHG | TEMPERATURE: 98.7 F | HEIGHT: 65 IN | WEIGHT: 173.5 LBS | RESPIRATION RATE: 20 BRPM | HEART RATE: 118 BPM | BODY MASS INDEX: 28.91 KG/M2 | OXYGEN SATURATION: 94 %

## 2022-02-07 DIAGNOSIS — D22.9 ATYPICAL NEVI: Primary | ICD-10-CM

## 2022-02-07 DIAGNOSIS — R06.2 WHEEZING: ICD-10-CM

## 2022-02-07 DIAGNOSIS — Z30.41 ENCOUNTER FOR SURVEILLANCE OF CONTRACEPTIVE PILLS: ICD-10-CM

## 2022-02-07 DIAGNOSIS — Z30.46 ENCOUNTER FOR NEXPLANON REMOVAL: ICD-10-CM

## 2022-02-07 PROCEDURE — 99213 OFFICE O/P EST LOW 20 MIN: CPT | Mod: 25 | Performed by: PHYSICIAN ASSISTANT

## 2022-02-07 PROCEDURE — 11982 REMOVE DRUG IMPLANT DEVICE: CPT | Performed by: PHYSICIAN ASSISTANT

## 2022-02-07 RX ORDER — NORGESTIMATE AND ETHINYL ESTRADIOL 0.25-0.035
1 KIT ORAL DAILY
Qty: 84 TABLET | Refills: 3 | Status: SHIPPED | OUTPATIENT
Start: 2022-02-07 | End: 2023-01-19

## 2022-02-07 RX ORDER — PREDNISONE 20 MG/1
40 TABLET ORAL DAILY
Qty: 10 TABLET | Refills: 0 | Status: SHIPPED | OUTPATIENT
Start: 2022-02-07 | End: 2022-02-12

## 2022-02-07 ASSESSMENT — MIFFLIN-ST. JEOR: SCORE: 1507.87

## 2022-02-07 ASSESSMENT — PAIN SCALES - GENERAL: PAINLEVEL: NO PAIN (0)

## 2022-02-07 NOTE — PROGRESS NOTES
"    Zach Cannon is a 31 year old who presents for the following health issues    HPI     Patient is here today for a nexplanon removal and to discuss a mole that has been on her nose since she was a teenager.     Patient had her Nexplanon placed 01/2020. She reports she has had increased weight gain since having this placed. She would prefer to have this removed and go back to taking the pill.    She also reports having a mole under the right side of her nose. She would like dermatology referral to consider removal.     Recently had COVID - still has cough that feels productive but nothing is coming up. Worst at night and right away in the morning. Feels a bit short of breath in the morning. Has been using Albuterol and Tessalon perles which do help.    Review of Systems   Constitutional, HEENT, cardiovascular, pulmonary, GI, , musculoskeletal, neuro, skin, endocrine and psych systems are negative, except as otherwise noted.      Objective    /70   Pulse 118   Temp 98.7  F (37.1  C) (Temporal)   Resp 20   Ht 1.651 m (5' 5\")   Wt 78.7 kg (173 lb 8 oz)   LMP 01/07/2022   SpO2 94%   Breastfeeding No   BMI 28.87 kg/m    Body mass index is 28.87 kg/m .  Physical Exam   GENERAL: healthy, alert and no distress  NECK: no adenopathy, no asymmetry, masses, or scars and thyroid normal to palpation  RESP: expiratory wheezes throughout  CV: regular rate and rhythm, normal S1 S2, no S3 or S4, no murmur, click or rub, no peripheral edema and peripheral pulses strong  SKIN: 6 mm raised nevi under right side of nose  PSYCH: mentation appears normal, affect normal/bright    Assessment & Plan     Encounter for surveillance of contraceptive pills  - norgestimate-ethinyl estradiol (ORTHO-CYCLEN) 0.25-35 MG-MCG tablet; Take 1 tablet by mouth daily    Atypical nevi  Referral to dermatology placed for removal.   - Adult Dermatology Referral; Future    Wheezing  Will have patient start short course of oral steroids " at this time. Encouraged supportive cares and use of Albuterol as needed.   - predniSONE (DELTASONE) 20 MG tablet; Take 2 tablets (40 mg) by mouth daily for 5 days    Encounter for Nexplanon removal  See procedure note.     The patient indicates understanding of these issues and agrees with the plan.    Yanci Patterson PA-C  [facility]

## 2022-02-07 NOTE — PROGRESS NOTES
Nexplanon Removal:     Is a pregnancy test required: No.  Was a consent obtained?  Yes    Kassandra Mead is here for removal of etonogestrel implant Nexplanon/Implanon    Indication: side effects      Preoperative Diagnosis: etonogestrel implant  Postoperative Diagnosis: etonogestrel implant removed    Technique: On the right arm  Skin prep Betadine  Anesthesia 1% lidocaine, with epi  Procedure: Small incision (<5mm) was made at distal end of palpable implant, curved hemostat or mosquito forceps was used to isolate the implant and bring it to the incision, the fibrous capsule containing the implant  was incised and the Implant was removed intact.    EBL: minimal  Complications:  No  Tolerance:  Pt tolerated procedure well and was in stable condition.   Dressing:    A pressure bandage was placed for the next 12-24 hours.    Contraception was discussed and patient chose the following method oral contraceptives      Follow up: Pt was instructed to call if bleeding, severe pain or foul smell.     Yanci Patterson PA-C

## 2022-04-10 ENCOUNTER — MYC REFILL (OUTPATIENT)
Dept: FAMILY MEDICINE | Facility: CLINIC | Age: 32
End: 2022-04-10
Payer: COMMERCIAL

## 2022-04-10 DIAGNOSIS — F41.1 GAD (GENERALIZED ANXIETY DISORDER): ICD-10-CM

## 2022-04-12 RX ORDER — BUSPIRONE HYDROCHLORIDE 10 MG/1
10 TABLET ORAL 2 TIMES DAILY
Qty: 180 TABLET | Refills: 1 | Status: SHIPPED | OUTPATIENT
Start: 2022-04-12 | End: 2023-01-19

## 2022-05-03 ENCOUNTER — MYC MEDICAL ADVICE (OUTPATIENT)
Dept: FAMILY MEDICINE | Facility: CLINIC | Age: 32
End: 2022-05-03
Payer: COMMERCIAL

## 2022-05-03 DIAGNOSIS — R63.5 WEIGHT GAIN: Primary | ICD-10-CM

## 2022-05-04 RX ORDER — TOPIRAMATE 25 MG/1
25 TABLET, FILM COATED ORAL 2 TIMES DAILY
Qty: 60 TABLET | Refills: 2 | Status: SHIPPED | OUTPATIENT
Start: 2022-05-04 | End: 2023-01-19

## 2022-05-22 ENCOUNTER — HEALTH MAINTENANCE LETTER (OUTPATIENT)
Age: 32
End: 2022-05-22

## 2022-08-31 ENCOUNTER — OFFICE VISIT (OUTPATIENT)
Dept: DERMATOLOGY | Facility: CLINIC | Age: 32
End: 2022-08-31
Attending: PHYSICIAN ASSISTANT
Payer: COMMERCIAL

## 2022-08-31 DIAGNOSIS — D23.9 DERMAL NEVUS: Primary | ICD-10-CM

## 2022-08-31 PROCEDURE — 11102 TANGNTL BX SKIN SINGLE LES: CPT | Performed by: PHYSICIAN ASSISTANT

## 2022-08-31 PROCEDURE — 88305 TISSUE EXAM BY PATHOLOGIST: CPT | Performed by: DERMATOLOGY

## 2022-08-31 NOTE — PATIENT INSTRUCTIONS

## 2022-08-31 NOTE — PROGRESS NOTES
[facility] Dermatology Note  Encounter Date: Aug 31, 2022  Office Visit     Dermatology Problem List:  1. Dermal nevus, under right nare s/p removal 8/31/22    ____________________________________________    Assessment & Plan:    # Dermal nevus - under right nare - review etiology. Counseled on risks of scar and reoccurrence.   - See procedure note.     Procedures Performed:   - Shave biopsy procedure note, location(s): under right nare. After discussion of benefits and risks including but not limited to bleeding, infection, scar, incomplete removal, recurrence, and non-diagnostic biopsy, written consent and photographs were obtained. The area was cleaned with isopropyl alcohol. 0.5mL of 1% lidocaine with epinephrine was injected to obtain adequate anesthesia of lesion(s). Shave biopsy at site(s) performed. Hemostasis was achieved with aluminium chloride. Petrolatum ointment and a sterile dressing were applied. The patient tolerated the procedure and no complications were noted. The patient was provided with verbal and written post care instructions.     Follow-up: pending path results    Staff and Scribe:     Scribe Disclosure:   KOLTON, Edmar Marsh, am serving as a scribe to document services personally performed by Roseanne Zamarripa PA-C, based on data collection and the provider's statements to me.  Provider Disclosure:   The documentation recorded by the scribe accurately reflects the services I personally performed and the decisions made by me.    All risks, benefits and alternatives were discussed with patient.  Patient is in agreement and understands the assessment and plan.  All questions were answered.    Roseanne Zamarripa PA-C, [facility]  [facility]: Phone: [phone], Fax: [phone]  [facility]: Phone: [phone],  Fax: [phone]  [facility]: Phone: [phone], Fax:  [phone]  ____________________________________________    CC: Derm Problem (Spot on face/ mole under R nare)    HPI:  Ms. Kassandra Mead is a(n) 32 year old female who presents today as a new patient for a spot check.     Referred to derm for atypical nevi. Notes on 22 was reviewed .    Today, patient notes concern on the right nare. Has been present since she was 19 years old.     Patient is otherwise feeling well, without additional concerns.    Labs:  NA    Physical Exam:  Vitals: There were no vitals taken for this visit.  SKIN: Focused examination of face was performed.  - 4 cm pink to flesh colored papule on R alar crease of nose  - No other lesions of concern on areas examined.     Medications:  Current Outpatient Medications   Medication     albuterol (PROAIR HFA/PROVENTIL HFA/VENTOLIN HFA) 108 (90 Base) MCG/ACT inhaler     albuterol (PROAIR HFA/PROVENTIL HFA/VENTOLIN HFA) 108 (90 Base) MCG/ACT inhaler     alcohol swab prep pads     benzonatate (TESSALON) 100 MG capsule     blood glucose (NO BRAND SPECIFIED) test strip     blood glucose monitoring (NO BRAND SPECIFIED) meter device kit     buPROPion (ZYBAN) 150 MG 12 hr tablet     busPIRone (BUSPAR) 10 MG tablet     clindamycin (CLINDAMAX) 1 % external gel     norgestimate-ethinyl estradiol (ORTHO-CYCLEN) 0.25-35 MG-MCG tablet     thin (NO BRAND SPECIFIED) lancets     topiramate (TOPAMAX) 25 MG tablet     No current facility-administered medications for this visit.      Past Medical History:   Patient Active Problem List   Diagnosis     Tobacco use disorder     Incomplete  with delayed or excessive hemorrhage     Past Medical History:   Diagnosis Date     NO ACTIVE PROBLEMS         CC Yanci Patterson PA-C  [address] [place] [address] on close of this encounter.

## 2022-08-31 NOTE — NURSING NOTE
Kassandra Mead's goals for this visit include:   Chief Complaint   Patient presents with     Derm Problem     Spot on face/ mole under R saharae     She requests these members of her care team be copied on today's visit information:     PCP: Clinic - [facility]    Referring Provider:  Yanci Patterson PA-C  [address] [place] [address]    Breastfeeding No     Do you need any medication refills at today's visit? No  Idalmis Jon, CMA on 8/31/2022 at 7:07 AM

## 2022-08-31 NOTE — LETTER
8/31/2022         RE: Kassandra Mead  [address] [address] [place] [id number]        Dear Colleague,    Thank you for referring your patient, Kassandra Mead, to the [facility]. Please see a copy of my visit note below.    [facility] Dermatology Note  Encounter Date: Aug 31, 2022  Office Visit     Dermatology Problem List:  1. Dermal nevus, under right nare s/p removal 8/31/22    ____________________________________________    Assessment & Plan:    # Dermal nevus - under right nare - review etiology. Counseled on risks of scar and reoccurrence.   - See procedure note.     Procedures Performed:   - Shave biopsy procedure note, location(s): under right nare. After discussion of benefits and risks including but not limited to bleeding, infection, scar, incomplete removal, recurrence, and non-diagnostic biopsy, written consent and photographs were obtained. The area was cleaned with isopropyl alcohol. 0.5mL of 1% lidocaine with epinephrine was injected to obtain adequate anesthesia of lesion(s). Shave biopsy at site(s) performed. Hemostasis was achieved with aluminium chloride. Petrolatum ointment and a sterile dressing were applied. The patient tolerated the procedure and no complications were noted. The patient was provided with verbal and written post care instructions.     Follow-up: pending path results    Staff and Scribe:     Scribe Disclosure:   KOLTON, Edmar Marsh, am serving as a scribe to document services personally performed by Roseanne Zamarripa PA-C, based on data collection and the provider's statements to me.  Provider Disclosure:   The documentation recorded by the scribe accurately reflects the services I personally performed and the decisions made by me.    All risks, benefits and alternatives were discussed with patient.  Patient is in agreement and understands the assessment and plan.  All questions were answered.    Roseanne Zamarripa PA-C, MPAS  [facility]  [facility]: Phone: [phone], Fax: [phone]  [facility]: Phone: [phone],  Fax: [phone]  [facility]: Phone: [phone], Fax: [phone]  ____________________________________________    CC: Derm Problem (Spot on face/ mole under R nare)    HPI:  Ms. Kassandra Mead is a(n) 32 year old female who presents today as a new patient for a spot check.     Referred to derm for atypical nevi. Notes on 22 was reviewed .    Today, patient notes concern on the right nare. Has been present since she was 19 years old.     Patient is otherwise feeling well, without additional concerns.    Labs:  NA    Physical Exam:  Vitals: There were no vitals taken for this visit.  SKIN: Focused examination of face was performed.  - 4 cm pink to flesh colored papule on R alar crease of nose  - No other lesions of concern on areas examined.     Medications:  Current Outpatient Medications   Medication     albuterol (PROAIR HFA/PROVENTIL HFA/VENTOLIN HFA) 108 (90 Base) MCG/ACT inhaler     albuterol (PROAIR HFA/PROVENTIL HFA/VENTOLIN HFA) 108 (90 Base) MCG/ACT inhaler     alcohol swab prep pads     benzonatate (TESSALON) 100 MG capsule     blood glucose (NO BRAND SPECIFIED) test strip     blood glucose monitoring (NO BRAND SPECIFIED) meter device kit     buPROPion (ZYBAN) 150 MG 12 hr tablet     busPIRone (BUSPAR) 10 MG tablet     clindamycin (CLINDAMAX) 1 % external gel     norgestimate-ethinyl estradiol (ORTHO-CYCLEN) 0.25-35 MG-MCG tablet     thin (NO BRAND SPECIFIED) lancets     topiramate (TOPAMAX) 25 MG tablet     No current facility-administered medications for this visit.      Past Medical History:   Patient Active Problem List   Diagnosis     Tobacco use disorder     Incomplete  with delayed or excessive hemorrhage     Past Medical History:   Diagnosis Date     NO ACTIVE PROBLEMS         CC Yanci Patterson,  SYED  [facility] [place] [address] on close of this encounter.        Again, thank you for allowing me to participate in the care of your patient.        Sincerely,        Roseanne Zamarripa PA-C

## 2022-08-31 NOTE — NURSING NOTE
The following medication was given:     MEDICATION:  Lidocaine with epinephrine 1% 1:[id number]  ROUTE: SQ  SITE: see procedure note  DOSE: 0.25cc  LOT #: [id number]  : [employer]  EXPIRATION DATE:12.1.22  NDC#: 9987-4563-11  Was there drug waste? 0.75cc  Multi-dose vial: Yes    Idalmis Jno, CMA on 8/31/2022 at 1:58 PM

## 2022-09-02 LAB
PATH REPORT.COMMENTS IMP SPEC: NORMAL
PATH REPORT.COMMENTS IMP SPEC: NORMAL
PATH REPORT.FINAL DX SPEC: NORMAL
PATH REPORT.GROSS SPEC: NORMAL
PATH REPORT.MICROSCOPIC SPEC OTHER STN: NORMAL
PATH REPORT.RELEVANT HX SPEC: NORMAL

## 2022-09-18 ENCOUNTER — HEALTH MAINTENANCE LETTER (OUTPATIENT)
Age: 32
End: 2022-09-18

## 2022-11-02 ENCOUNTER — E-VISIT (OUTPATIENT)
Dept: FAMILY MEDICINE | Facility: CLINIC | Age: 32
End: 2022-11-02
Payer: COMMERCIAL

## 2022-11-02 DIAGNOSIS — R05.9 COUGH, UNSPECIFIED TYPE: Primary | ICD-10-CM

## 2022-11-02 PROCEDURE — 99421 OL DIG E/M SVC 5-10 MIN: CPT | Performed by: PHYSICIAN ASSISTANT

## 2022-11-02 RX ORDER — PREDNISONE 20 MG/1
40 TABLET ORAL DAILY
Qty: 10 TABLET | Refills: 0 | Status: SHIPPED | OUTPATIENT
Start: 2022-11-02 | End: 2022-11-07

## 2022-11-02 RX ORDER — ALBUTEROL SULFATE 90 UG/1
2 AEROSOL, METERED RESPIRATORY (INHALATION) EVERY 6 HOURS
Qty: 18 G | Refills: 0 | Status: SHIPPED | OUTPATIENT
Start: 2022-11-02 | End: 2023-01-19

## 2022-11-02 NOTE — PATIENT INSTRUCTIONS
Talha Cannon,     I sent in a steroid as well as an Albuterol inhaler that you can use every 4 hours as needed for cough/chest tightness/shortness of breath. If things do not seem to improve in the next 2 days please let me know and we can add a course of antibiotics.     Feel better,   Yanci Patterson PA-C

## 2023-01-19 ENCOUNTER — OFFICE VISIT (OUTPATIENT)
Dept: FAMILY MEDICINE | Facility: CLINIC | Age: 33
End: 2023-01-19
Payer: COMMERCIAL

## 2023-01-19 VITALS
HEIGHT: 64 IN | OXYGEN SATURATION: 97 % | HEART RATE: 80 BPM | SYSTOLIC BLOOD PRESSURE: 122 MMHG | BODY MASS INDEX: 39.61 KG/M2 | DIASTOLIC BLOOD PRESSURE: 70 MMHG | TEMPERATURE: 97.1 F | RESPIRATION RATE: 20 BRPM | WEIGHT: 232 LBS

## 2023-01-19 DIAGNOSIS — L70.9 ACNE, UNSPECIFIED ACNE TYPE: ICD-10-CM

## 2023-01-19 DIAGNOSIS — F41.1 GAD (GENERALIZED ANXIETY DISORDER): ICD-10-CM

## 2023-01-19 DIAGNOSIS — E66.812 CLASS 2 OBESITY WITHOUT SERIOUS COMORBIDITY WITH BODY MASS INDEX (BMI) OF 39.0 TO 39.9 IN ADULT, UNSPECIFIED OBESITY TYPE: Primary | ICD-10-CM

## 2023-01-19 PROCEDURE — 99213 OFFICE O/P EST LOW 20 MIN: CPT | Performed by: PHYSICIAN ASSISTANT

## 2023-01-19 RX ORDER — BUPROPION HYDROCHLORIDE 150 MG/1
150 TABLET ORAL EVERY MORNING
Qty: 90 TABLET | Refills: 0 | Status: SHIPPED | OUTPATIENT
Start: 2023-01-19 | End: 2023-04-18

## 2023-01-19 RX ORDER — BUSPIRONE HYDROCHLORIDE 10 MG/1
10 TABLET ORAL 2 TIMES DAILY
Qty: 180 TABLET | Refills: 1 | Status: SHIPPED | OUTPATIENT
Start: 2023-01-19 | End: 2023-04-19

## 2023-01-19 RX ORDER — CLINDAMYCIN PHOSPHATE 10 MG/G
GEL TOPICAL 2 TIMES DAILY
Qty: 60 G | Refills: 11 | Status: SHIPPED | OUTPATIENT
Start: 2023-01-19 | End: 2023-09-15

## 2023-01-19 RX ORDER — PHENTERMINE HYDROCHLORIDE 30 MG/1
30 CAPSULE ORAL EVERY MORNING
Qty: 30 CAPSULE | Refills: 2 | Status: SHIPPED | OUTPATIENT
Start: 2023-01-19 | End: 2023-04-19

## 2023-01-19 NOTE — PROGRESS NOTES
"  Zach Cannon is a 32 year old, presenting for the following health issues:  Weight Problem      History of Present Illness       Reason for visit:  Weight options    She eats 0-1 servings of fruits and vegetables daily.She consumes 0 sweetened beverage(s) daily.She exercises with enough effort to increase her heart rate 9 or less minutes per day.  She exercises with enough effort to increase her heart rate 3 or less days per week.   She is taking medications regularly.     Patient presents today to discuss medications for weight loss. She reports she is the heaviest she has ever been. She recently quit smoking- while she is very proud of this accomplishment she is frustrated by the weight gain. She reports her mom needed gastric bypass and would really like to avoid that. She has done keto diet in the past with some relief but then weight tends to return.     Review of Systems   Constitutional, HEENT, cardiovascular, pulmonary, GI, , musculoskeletal, neuro, skin, endocrine and psych systems are negative, except as otherwise noted.      Objective    /70   Pulse 80   Temp 97.1  F (36.2  C) (Temporal)   Resp 20   Ht 1.626 m (5' 4\")   Wt 105.2 kg (232 lb)   LMP 01/11/2023   SpO2 97%   BMI 39.82 kg/m    Body mass index is 39.82 kg/m .  Physical Exam   GENERAL: healthy, alert and no distress  NECK: no adenopathy, no asymmetry, masses, or scars and thyroid normal to palpation  RESP: lungs clear to auscultation - no rales, rhonchi or wheezes  CV: regular rate and rhythm, normal S1 S2, no S3 or S4, no murmur, click or rub, no peripheral edema and peripheral pulses strong  SKIN: no suspicious lesions or rashes  PSYCH: mentation appears normal, affect normal/bright      Assessment & Plan     Class 2 obesity without serious comorbidity with body mass index (BMI) of 39.0 to 39.9 in adult, unspecified obesity type  Options for weight loss discussed. She has been on Wellbutrin in the past and tolerated this " well. This was restarted to help with weight loss along with energy and motivation. Will also have patient start Phentermine in the morning.  Appropriate use, side effects and dose titration if needed discussed. Encouraged ongoing diet and exercise modifications. Recheck in 3 months, sooner if needed.   - phentermine (ADIPEX-P) 30 MG capsule; Take 1 capsule (30 mg) by mouth every morning  - buPROPion (WELLBUTRIN XL) 150 MG 24 hr tablet; Take 1 tablet (150 mg) by mouth every morning    KEMAR (generalized anxiety disorder)  Stable, continue Buspar twice daily   - busPIRone (BUSPAR) 10 MG tablet; Take 1 tablet (10 mg) by mouth 2 times daily    Acne, unspecified acne type  Stable.   - clindamycin (CLINDAMAX) 1 % external gel; Apply topically 2 times daily    The patient indicates understanding of these issues and agrees with the plan.    Yanci Patterson PA-C  [facility]

## 2023-03-09 ENCOUNTER — IMMUNIZATION (OUTPATIENT)
Dept: FAMILY MEDICINE | Facility: CLINIC | Age: 33
End: 2023-03-09
Payer: COMMERCIAL

## 2023-03-09 PROCEDURE — 90686 IIV4 VACC NO PRSV 0.5 ML IM: CPT

## 2023-03-09 PROCEDURE — 90471 IMMUNIZATION ADMIN: CPT

## 2023-03-15 ENCOUNTER — HOSPITAL ENCOUNTER (EMERGENCY)
Facility: CLINIC | Age: 33
Discharge: HOME | End: 2023-03-15
Attending: EMERGENCY MEDICINE | Admitting: EMERGENCY MEDICINE
Payer: COMMERCIAL

## 2023-03-15 ENCOUNTER — APPOINTMENT (OUTPATIENT)
Dept: GENERAL RADIOLOGY | Facility: CLINIC | Age: 33
End: 2023-03-15
Attending: EMERGENCY MEDICINE
Payer: COMMERCIAL

## 2023-03-15 VITALS
WEIGHT: 225.7 LBS | BODY MASS INDEX: 38.74 KG/M2 | HEART RATE: 86 BPM | RESPIRATION RATE: 18 BRPM | SYSTOLIC BLOOD PRESSURE: 122 MMHG | DIASTOLIC BLOOD PRESSURE: 80 MMHG | TEMPERATURE: 99 F | OXYGEN SATURATION: 97 %

## 2023-03-15 DIAGNOSIS — S61.451A DOG BITE OF RIGHT HAND, INITIAL ENCOUNTER: ICD-10-CM
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
## 2023-03-15 DIAGNOSIS — S61.411A LACERATION OF RIGHT HAND WITHOUT FOREIGN BODY, INITIAL ENCOUNTER: ICD-10-CM

## 2023-03-15 PROCEDURE — 99283 EMERGENCY DEPT VISIT LOW MDM: CPT | Mod: 25 | Performed by: EMERGENCY MEDICINE

## 2023-03-15 PROCEDURE — 250N000011 HC RX IP 250 OP 636: Performed by: EMERGENCY MEDICINE

## 2023-03-15 PROCEDURE — 90471 IMMUNIZATION ADMIN: CPT | Performed by: EMERGENCY MEDICINE

## 2023-03-15 PROCEDURE — 73130 X-RAY EXAM OF HAND: CPT | Mod: RT

## 2023-03-15 PROCEDURE — 99284 EMERGENCY DEPT VISIT MOD MDM: CPT | Mod: 25 | Performed by: EMERGENCY MEDICINE

## 2023-03-15 PROCEDURE — 12001 RPR S/N/AX/GEN/TRNK 2.5CM/<: CPT | Performed by: EMERGENCY MEDICINE

## 2023-03-15 PROCEDURE — 250N000009 HC RX 250: Performed by: EMERGENCY MEDICINE

## 2023-03-15 PROCEDURE — 90715 TDAP VACCINE 7 YRS/> IM: CPT | Performed by: EMERGENCY MEDICINE

## 2023-03-15 PROCEDURE — 250N000013 HC RX MED GY IP 250 OP 250 PS 637: Performed by: EMERGENCY MEDICINE

## 2023-03-15 RX ORDER — IBUPROFEN 600 MG/1
600 TABLET, FILM COATED ORAL ONCE
Status: COMPLETED | OUTPATIENT
Start: 2023-03-15 | End: 2023-03-15

## 2023-03-15 RX ORDER — METHYLCELLULOSE 4000CPS 30 %
POWDER (GRAM) MISCELLANEOUS ONCE
Status: DISCONTINUED | OUTPATIENT
Start: 2023-03-15 | End: 2023-03-15 | Stop reason: HOSPADM

## 2023-03-15 RX ORDER — HYDROCODONE BITARTRATE AND ACETAMINOPHEN 5; 325 MG/1; MG/1
1 TABLET ORAL EVERY 6 HOURS PRN
Qty: 10 TABLET | Refills: 0 | Status: SHIPPED | OUTPATIENT
Start: 2023-03-15 | End: 2023-03-18

## 2023-03-15 RX ADMIN — CLOSTRIDIUM TETANI TOXOID ANTIGEN (FORMALDEHYDE INACTIVATED), CORYNEBACTERIUM DIPHTHERIAE TOXOID ANTIGEN (FORMALDEHYDE INACTIVATED), BORDETELLA PERTUSSIS TOXOID ANTIGEN (GLUTARALDEHYDE INACTIVATED), BORDETELLA PERTUSSIS FILAMENTOUS HEMAGGLUTININ ANTIGEN (FORMALDEHYDE INACTIVATED), BORDETELLA PERTUSSIS PERTACTIN ANTIGEN, AND BORDETELLA PERTUSSIS FIMBRIAE 2/3 ANTIGEN 0.5 ML: 5; 2; 2.5; 5; 3; 5 INJECTION, SUSPENSION INTRAMUSCULAR at 09:21

## 2023-03-15 RX ADMIN — IBUPROFEN 600 MG: 600 TABLET, FILM COATED ORAL at 08:45

## 2023-03-15 RX ADMIN — Medication 3 ML: at 08:45

## 2023-03-15 RX ADMIN — AMOXICILLIN AND CLAVULANATE POTASSIUM 1 TABLET: 875; 125 TABLET, FILM COATED ORAL at 09:21

## 2023-03-15 ASSESSMENT — ACTIVITIES OF DAILY LIVING (ADL)
ADLS_ACUITY_SCORE: 35
ADLS_ACUITY_SCORE: 35

## 2023-03-15 NOTE — DISCHARGE INSTRUCTIONS
Try to rest and elevate the hand is much as possible to decrease pain and swelling.    Change dressings 1-2 times daily.  Okay to place a very tiny amount of bacitracin over the wound if desired.    Sutures out in 10 days.    Take antibiotics as prescribed to prevent infection.  I recommend eating yogurt or taking probiotics while on antibiotics.    Ibuprofen or Aleve to decrease pain and inflammation.  Okay to add Tylenol if needed.    If you have any significant increased pain, redness, pus or other concerns return at any time.    I hope that you heal quickly!!

## 2023-03-15 NOTE — ED PROVIDER NOTES
"  History     Chief Complaint   Patient presents with     Dog Bite     HPI  History per patient, medical records    This is a 32-year-old female, basically healthy, presenting after dog bite.  Patient's puppies were fighting and when she went to break them up she got bit on her right hand.  She is left-handed.  She notes pain and swelling.  She has not taken anything for this.  Occurred just prior to coming to the ED.  Last tetanus was .  No other injuries or complaints.  Patient's dogs are immunized.    Allergies:  No Known Allergies    Problem List:    Patient Active Problem List    Diagnosis Date Noted     Incomplete  with delayed or excessive hemorrhage 2020     Priority: Medium     Added automatically from request for surgery [id number]       Tobacco use disorder 2010     Priority: Medium        Past Medical History:    Past Medical History:   Diagnosis Date     NO ACTIVE PROBLEMS        Past Surgical History:    Past Surgical History:   Procedure Laterality Date     DILATION AND CURETTAGE SUCTION N/A 2020    Procedure: DILATION AND CURETTAGE, UTERUS, USING SUCTION US guidance;  Surgeon: Ekta Hernandez DO;  Location: [facility] OR       Family History:    Family History   Problem Relation Age of Onset     Alopecia Mother      Cervical Cancer Mother      Alcoholism Mother      Anxiety Disorder Mother      Asthma Father      Bronchitis Father      Diabetes Father         Pre-diabetes     Anxiety Disorder Sister      Blood Disease Maternal Grandmother         polycythemia vera     No Known Problems Paternal Grandmother      Heart Disease Paternal Grandfather         \"valve problem\"     No Known Problems Daughter      Asthma Daughter         \"septic\"     No Known Problems Son        Social History:  Marital Status:  Single [1]  Social History     Tobacco Use     Smoking status: Former     Packs/day: 1.00     Years: 13.00     Pack years: 13.00     Types: Cigarettes     Start date: 2006    "  Quit date: 2022     Years since quittin.9     Smokeless tobacco: Never   Vaping Use     Vaping Use: Never used   Substance Use Topics     Alcohol use: Not Currently     Drug use: Not Currently        Medications:    amoxicillin-clavulanate (AUGMENTIN) 875-125 MG tablet  HYDROcodone-acetaminophen (NORCO) 5-325 MG tablet  albuterol (PROAIR HFA/PROVENTIL HFA/VENTOLIN HFA) 108 (90 Base) MCG/ACT inhaler  buPROPion (WELLBUTRIN XL) 150 MG 24 hr tablet  busPIRone (BUSPAR) 10 MG tablet  clindamycin (CLINDAMAX) 1 % external gel  phentermine (ADIPEX-P) 30 MG capsule          Review of Systems   All other ROS reviewed and are negative or non-contributory except as stated in HPI.     Physical Exam   BP: 130/83  Pulse: 90  Temp: 99  F (37.2  C)  Resp: 18  Weight: 102.4 kg (225 lb 11.2 oz)  SpO2: 97 %      Physical Exam  Vitals and nursing note reviewed.   Constitutional:       Appearance: Normal appearance.      Comments: Healthy-appearing female sitting in the bed   HENT:      Head: Normocephalic.   Eyes:      Extraocular Movements: Extraocular movements intact.      Conjunctiva/sclera: Conjunctivae normal.   Cardiovascular:      Rate and Rhythm: Normal rate.      Pulses: Normal pulses.   Pulmonary:      Effort: Pulmonary effort is normal.   Musculoskeletal:        Hands:       Cervical back: Normal range of motion and neck supple.      Comments: Patient has normal range of motion of the fingers.  She has some tenderness and pain at the area of the third/fourth MCP.  Sensation intact distally.  Normal pulses and cap refill.   Skin:     General: Skin is warm and dry.   Neurological:      General: No focal deficit present.      Mental Status: She is alert.   Psychiatric:         Mood and Affect: Mood normal.         Behavior: Behavior normal.                 ED Course (with Medical Decision Making)    Pt seen and examined by me.  RN and EPIC notes reviewed.       Patient with hand injury from dog bites.  X-ray was done.   No obvious fracture.  Let was placed on the wounds.  They were copiously washed in the sink and irrigated.  Unfortunately, there is too much exposed fat and I think they need to be tacked together.    Please see procedure note.    Antibiotics started in the ED, Augmentin.  She was given an Rx, small amount, for Norco for severe pain.  Recommend elevating.  Ibuprofen or Aleve.  Watch for any signs of infection and follow-up/return to the ED for concerns.  Sutures out in 10 days.  Wound care discussed.  Tetanus updated.      formerly [facility]    -Laceration Repair    Date/Time: 3/16/2023 2:11 AM  Performed by: Haylee Willson MD  Authorized by: Haylee Willson MD     Risks, benefits and alternatives discussed.      ANESTHESIA (see MAR for exact dosages):     Anesthesia method:  Topical application and local infiltration    Topical anesthetic:  LET    Local anesthetic:  Bupivacaine 0.25% WITH epi  LACERATION DETAILS     Location:  Hand    Hand location:  L hand, dorsum    Wound length (cm): Laceration near fourth finger, 2.5 cm.  Laceration near thumb, 1 cm.    REPAIR TYPE:     Repair type:  Simple      EXPLORATION:     Hemostasis achieved with:  LET and epinephrine    Wound exploration: wound explored through full range of motion      Contaminated: no      TREATMENT:     Amount of cleaning:  Extensive    Irrigation solution:  Tap water and sterile water    Irrigation volume:  500+    Irrigation method:  Tap and syringe    Visualized foreign bodies/material removed: no      SKIN REPAIR     Repair method:  Sutures    Suture size:  4-0    Suture material:  Nylon    Suture technique:  Simple interrupted    Number of sutures: 4 and 2.    APPROXIMATION     Approximation:  Loose    POST-PROCEDURE DETAILS     Dressing:  Antibiotic ointment (Dressings per nursing)            Results for orders placed or performed during the hospital encounter of 03/15/23 (from the past 24 hour(s))    XR Hand Right G/E 3 Views    Narrative    XR HAND RIGHT G/E 3 VIEWS 3/15/2023 8:57 AM     HISTORY: dog bites    COMPARISON: None.         Impression    IMPRESSION: The right hand is negative for fracture. No dislocation.  No foreign bodies are identified. Soft tissue swelling over the dorsal  aspect of the MCP joint row.    DHIRAJ HILL MD         SYSTEM ID:  [id number]       Medications   ibuprofen (ADVIL/MOTRIN) tablet 600 mg (600 mg Oral $Given 3/15/23 0845)   lido-EPINEPHrine-tetracaine (LET) topical gel GEL (3 mLs Topical $Given 3/15/23 0845)   Tdap (tetanus-diphtheria-acell pertussis) (ADACEL) injection 0.5 mL (0.5 mLs Intramuscular $Given 3/15/23 0921)   amoxicillin-clavulanate (AUGMENTIN) 875-125 MG per tablet 1 tablet (1 tablet Oral $Given 3/15/23 0921)       Assessments & Plan     I have reviewed the findings, diagnosis, plan and need for follow up with the patient.  Discharge Medication List as of 3/15/2023 11:04 AM      START taking these medications    Details   amoxicillin-clavulanate (AUGMENTIN) 875-125 MG tablet Take 1 tablet by mouth 2 times daily for 10 days, Disp-20 tablet, R-0, E-Prescribe      HYDROcodone-acetaminophen (NORCO) 5-325 MG tablet Take 1 tablet by mouth every 6 hours as needed for severe pain (7-10), Disp-10 tablet, R-0, E-Prescribe             Final diagnoses:   Dog bite of right hand, initial encounter   Laceration of right hand without foreign body, initial encounter     Disposition: Patient discharged home in stable condition.  Plan as above.  Return for concerns.     Note: Chart documentation done in part with Dragon Voice Recognition software. Although reviewed after completion, some word and grammatical errors may remain.     3/15/2023   [facility] EMERGENCY DEPT     Haylee Willson MD  03/16/23 [id number]

## 2023-03-15 NOTE — ED TRIAGE NOTES
Pt reports two puppies were fighting and she went to break them up and got bit, has bites to the right hand

## 2023-04-18 ENCOUNTER — MYC MEDICAL ADVICE (OUTPATIENT)
Dept: FAMILY MEDICINE | Facility: CLINIC | Age: 33
End: 2023-04-18
Payer: COMMERCIAL

## 2023-04-18 ENCOUNTER — MYC REFILL (OUTPATIENT)
Dept: FAMILY MEDICINE | Facility: CLINIC | Age: 33
End: 2023-04-18
Payer: COMMERCIAL

## 2023-04-18 DIAGNOSIS — E66.812 CLASS 2 OBESITY WITHOUT SERIOUS COMORBIDITY WITH BODY MASS INDEX (BMI) OF 39.0 TO 39.9 IN ADULT, UNSPECIFIED OBESITY TYPE: ICD-10-CM

## 2023-04-18 DIAGNOSIS — F41.1 GAD (GENERALIZED ANXIETY DISORDER): ICD-10-CM

## 2023-04-19 RX ORDER — BUPROPION HYDROCHLORIDE 150 MG/1
150 TABLET ORAL EVERY MORNING
Qty: 90 TABLET | Refills: 1 | Status: SHIPPED | OUTPATIENT
Start: 2023-04-19 | End: 2023-09-15

## 2023-04-19 RX ORDER — PHENTERMINE HYDROCHLORIDE 30 MG/1
30 CAPSULE ORAL EVERY MORNING
Qty: 30 CAPSULE | Refills: 2 | Status: SHIPPED | OUTPATIENT
Start: 2023-04-19 | End: 2023-04-26

## 2023-04-19 RX ORDER — BUSPIRONE HYDROCHLORIDE 10 MG/1
10 TABLET ORAL 2 TIMES DAILY
Qty: 180 TABLET | Refills: 1 | Status: SHIPPED | OUTPATIENT
Start: 2023-04-19 | End: 2023-09-15

## 2023-04-19 NOTE — TELEPHONE ENCOUNTER
Prescription approved per [facility] Refill Protocol.  Rosibel Judd RN on 4/19/2023 at 12:42 PM

## 2023-04-19 NOTE — TELEPHONE ENCOUNTER
Pending Prescriptions:                       Disp   Refills    busPIRone (BUSPAR) 10 MG tablet            180 ta*1        Sig: Take 1 tablet (10 mg) by mouth 2 times daily    phentermine (ADIPEX-P) 30 MG capsule       30 cap*2        Sig: Take 1 capsule (30 mg) by mouth every morning      Routing refill request to provider for review/approval because:  Drug not on the [facility] refill protocol     Rosibel Judd RN on 4/19/2023 at 12:50 PM

## 2023-05-04 ENCOUNTER — VIRTUAL VISIT (OUTPATIENT)
Dept: FAMILY MEDICINE | Facility: CLINIC | Age: 33
End: 2023-05-04
Payer: COMMERCIAL

## 2023-05-04 DIAGNOSIS — Z34.81 ENCOUNTER FOR SUPERVISION OF OTHER NORMAL PREGNANCY IN FIRST TRIMESTER: Primary | ICD-10-CM

## 2023-05-04 PROCEDURE — 99207 PR NO CHARGE NURSE ONLY: CPT | Mod: 93

## 2023-05-04 NOTE — PROGRESS NOTES
OB intake phone visit with verbal permission.    COVID infection x3 not recently and not vaccinated

## 2023-05-07 LAB
ABO/RH(D): NORMAL
ANTIBODY SCREEN: NEGATIVE
SPECIMEN EXPIRATION DATE: NORMAL

## 2023-05-08 ENCOUNTER — HOSPITAL ENCOUNTER (OUTPATIENT)
Dept: ULTRASOUND IMAGING | Facility: CLINIC | Age: 33
Discharge: HOME | End: 2023-05-08
Attending: NURSE PRACTITIONER | Admitting: NURSE PRACTITIONER
Payer: COMMERCIAL

## 2023-05-08 ENCOUNTER — LAB (OUTPATIENT)
Dept: LAB | Facility: CLINIC | Age: 33
End: 2023-05-08
Payer: COMMERCIAL

## 2023-05-08 DIAGNOSIS — Z34.81 ENCOUNTER FOR SUPERVISION OF OTHER NORMAL PREGNANCY IN FIRST TRIMESTER: ICD-10-CM

## 2023-05-08 LAB
ERYTHROCYTE [DISTWIDTH] IN BLOOD BY AUTOMATED COUNT: 11.9 % (ref 10–15)
HCT VFR BLD AUTO: 44.2 % (ref 35–47)
HGB BLD-MCNC: 14.7 G/DL (ref 11.7–15.7)
MCH RBC QN AUTO: 30.7 PG (ref 26.5–33)
MCHC RBC AUTO-ENTMCNC: 33.3 G/DL (ref 31.5–36.5)
MCV RBC AUTO: 92 FL (ref 78–100)
PLATELET # BLD AUTO: 276 10E3/UL (ref 150–450)
RBC # BLD AUTO: 4.79 10E6/UL (ref 3.8–5.2)
WBC # BLD AUTO: 6.9 10E3/UL (ref 4–11)

## 2023-05-08 PROCEDURE — 86762 RUBELLA ANTIBODY: CPT

## 2023-05-08 PROCEDURE — 86850 RBC ANTIBODY SCREEN: CPT

## 2023-05-08 PROCEDURE — 86901 BLOOD TYPING SEROLOGIC RH(D): CPT

## 2023-05-08 PROCEDURE — 86900 BLOOD TYPING SEROLOGIC ABO: CPT

## 2023-05-08 PROCEDURE — 87086 URINE CULTURE/COLONY COUNT: CPT

## 2023-05-08 PROCEDURE — 86780 TREPONEMA PALLIDUM: CPT

## 2023-05-08 PROCEDURE — 36415 COLL VENOUS BLD VENIPUNCTURE: CPT

## 2023-05-08 PROCEDURE — 86803 HEPATITIS C AB TEST: CPT

## 2023-05-08 PROCEDURE — 85027 COMPLETE CBC AUTOMATED: CPT

## 2023-05-08 PROCEDURE — 76801 OB US < 14 WKS SINGLE FETUS: CPT

## 2023-05-08 PROCEDURE — 87389 HIV-1 AG W/HIV-1&-2 AB AG IA: CPT

## 2023-05-08 PROCEDURE — 87340 HEPATITIS B SURFACE AG IA: CPT

## 2023-05-09 LAB
HBV SURFACE AG SERPL QL IA: NONREACTIVE
HCV AB SERPL QL IA: NONREACTIVE
HIV 1+2 AB+HIV1 P24 AG SERPL QL IA: NONREACTIVE
RUBV IGG SERPL QL IA: 0.88 INDEX
RUBV IGG SERPL QL IA: NORMAL
T PALLIDUM AB SER QL: NONREACTIVE

## 2023-05-10 LAB — BACTERIA UR CULT: NORMAL

## 2023-05-25 ENCOUNTER — PRENATAL OFFICE VISIT (OUTPATIENT)
Dept: FAMILY MEDICINE | Facility: CLINIC | Age: 33
End: 2023-05-25
Payer: COMMERCIAL

## 2023-05-25 VITALS
RESPIRATION RATE: 18 BRPM | SYSTOLIC BLOOD PRESSURE: 106 MMHG | OXYGEN SATURATION: 97 % | WEIGHT: 225.6 LBS | HEART RATE: 80 BPM | BODY MASS INDEX: 37.59 KG/M2 | HEIGHT: 65 IN | DIASTOLIC BLOOD PRESSURE: 60 MMHG | TEMPERATURE: 98.1 F

## 2023-05-25 DIAGNOSIS — O09.91 PREGNANCY, SUPERVISION, HIGH-RISK, FIRST TRIMESTER: Primary | ICD-10-CM

## 2023-05-25 PROCEDURE — 99207 PR PRENATAL VISIT: CPT | Performed by: NURSE PRACTITIONER

## 2023-05-25 RX ORDER — ONDANSETRON 4 MG/1
4 TABLET, ORALLY DISINTEGRATING ORAL EVERY 8 HOURS PRN
Qty: 30 TABLET | Refills: 3 | Status: SHIPPED | OUTPATIENT
Start: 2023-05-25 | End: 2023-06-24

## 2023-05-25 RX ORDER — PRENATAL VIT/IRON FUM/FOLIC AC 27MG-0.8MG
1 TABLET ORAL DAILY
COMMUNITY

## 2023-05-25 ASSESSMENT — PAIN SCALES - GENERAL: PAINLEVEL: NO PAIN (0)

## 2023-05-25 NOTE — PROGRESS NOTES
MBI    Pregnancy risks              1 Obesity:  BMI 35-39.9.  antepartum testing 37 weeks once weekly, consdier growth us at 32 weeks, Goal for weight gain during the pregnancy is 11-20 pounds                2.  KEMAR- stopped buspar- states she used to take it prn and was helpful for her- discussed safety profile during pregnancy               3. Phentermine- stopped when had positive pregnancy test when she missed her period.   Found out pregnant .         Prenatal care plan              - OB labs:  Blood type A pos,  Not immune Rubella, all others are normal              - First trimester screening:  Discussed will do NIPS              - Second trimester screening:  Discussed will do AFP              - Pain management:  Will discuss at the future visit              - Ped: ROSA Leblanc              - Circumcision if boy: Will discuss at the future visit              - BC: Will discuss at the future visit              - Breast feeding:  Will discuss in the future visits              - Covid 19 vaccine: declined                SUBJECTIVE:     HPI:    This is a 32 year old female patient,  who presents for her first obstetrical visit.    DENY: 2023, by Last Menstrual Period.  She is 11w0d weeks.  Her cycles are regular.  Her last menstrual period was normal.   Since her LMP, she has experienced  nausea, emesis, fatigue, lightheadedness and urinary frequency).   She denies abdominal pain, headache, loss of appetite, vaginal discharge, dysuria, pelvic pain, urinary urgency, vaginal bleeding, hemorrhoids and constipation.    Additional History:     Have you travelled during the pregnancy?No  Have your sexual partner(s) travelled during the pregnancy?No    Miscarriage    12 daughter- Migdalia (Kassandra's), 11 Martinez (Nikolas's), 10= Lisa (together), 9 Aramis ( together)    Delivered [place]/ [facility]- all term, all vaginal.  No tears.  No htn, gestational diabetes.  Some hyperemesis-  Did not need IVF.   "Controlled with zofran.        HISTORY:   Planned Pregnancy: No but wanted  Marital Status: Single  Occupation: Insurance Rep for [employer]  Living in Household: Significant Other and Children    Past History:  Her past medical history   Past Medical History:   Diagnosis Date     NO ACTIVE PROBLEMS    .      She has a history of  miscarriage with D and C  2020    Since her last LMP she denies use of alcohol, tobacco and street drugs.    Past medical, surgical, social and family history were reviewed and updated in [facility].        Current Outpatient Medications   Medication     ondansetron (ZOFRAN ODT) 4 MG ODT tab     Prenatal Vit-Fe Fumarate-FA (PRENATAL MULTIVITAMIN W/IRON) 27-0.8 MG tablet     albuterol (PROAIR HFA/PROVENTIL HFA/VENTOLIN HFA) 108 (90 Base) MCG/ACT inhaler     buPROPion (WELLBUTRIN XL) 150 MG 24 hr tablet     busPIRone (BUSPAR) 10 MG tablet     clindamycin (CLINDAMAX) 1 % external gel     ondansetron (ZOFRAN ODT) 4 MG ODT tab     No current facility-administered medications for this visit.       ROS:   12 point review of systems negative other than symptoms noted below or in the HPI.      OBJECTIVE:     EXAM:  /60   Pulse 80   Temp 98.1  F (36.7  C) (Temporal)   Resp 18   Ht 1.651 m (5' 5\")   Wt 102.3 kg (225 lb 9.6 oz)   LMP 03/09/2023   SpO2 97%   BMI 37.54 kg/m   Body mass index is 37.54 kg/m .    GENERAL: healthy, alert and no distress  EYES: Eyes grossly normal to inspection, PERRL and conjunctivae and sclerae normal  HENT: ear canals and TM's normal, nose and mouth without ulcers or lesions  NECK: no adenopathy, no asymmetry, masses, or scars and thyroid normal to palpation  RESP: lungs clear to auscultation - no rales, rhonchi or wheezes  BREAST: normal without masses, tenderness or nipple discharge and no palpable axillary masses or adenopathy  CV: regular rate and rhythm, normal S1 S2, no S3 or S4, no murmur, click or rub, no peripheral edema and peripheral pulses strong  ABDOMEN: " soft, nontender, no hepatosplenomegaly, no masses and bowel sounds normal  MS: no gross musculoskeletal defects noted, no edema  SKIN: no suspicious lesions or rashes  NEURO: Normal strength and tone, mentation intact and speech normal  PSYCH: mentation appears normal, affect normal/bright    ASSESSMENT/PLAN:       ICD-10-CM    1. 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 Patient would like to think about further, will discuss next visit  -Discussed Cystic Fibrosis and SMA carrier screening, patient would like to think about further, will discuss next visit  -Discussed ordered labs and ultrasound,   all questions answered.      -Folder given, outlining exercise, weight gain, schedule of visits, routine and indicated ultrasounds, prenatal vitamins and childbirth education.  Desires delivery at [facility]     Increased screening with BMI >35        Return in 4 weeks for routine OB care    Sruthi Martin, BEN Martin, NP

## 2023-05-25 NOTE — PROGRESS NOTES
Concerns: ***  {[id number]}  Will schedule anatomy ultrasound.  RTC 4 weeks.      Sruthi Martin, NP

## 2023-05-30 ENCOUNTER — LAB (OUTPATIENT)
Dept: LAB | Facility: CLINIC | Age: 33
End: 2023-05-30
Payer: COMMERCIAL

## 2023-05-30 DIAGNOSIS — O09.A1 SUPERVISION OF HIGH-RISK PREGNANCY WITH HISTORY OF TROPHOBLASTIC DISEASE IN FIRST TRIMESTER: ICD-10-CM

## 2023-05-30 PROCEDURE — 87491 CHLMYD TRACH DNA AMP PROBE: CPT

## 2023-05-30 PROCEDURE — 36415 COLL VENOUS BLD VENIPUNCTURE: CPT

## 2023-05-30 PROCEDURE — 87591 N.GONORRHOEAE DNA AMP PROB: CPT

## 2023-05-31 LAB
C TRACH DNA SPEC QL NAA+PROBE: NEGATIVE
N GONORRHOEA DNA SPEC QL NAA+PROBE: NEGATIVE

## 2023-06-04 ENCOUNTER — HEALTH MAINTENANCE LETTER (OUTPATIENT)
Age: 33
End: 2023-06-04

## 2023-06-05 LAB — SCANNED LAB RESULT: NORMAL

## 2023-07-14 ENCOUNTER — PRENATAL OFFICE VISIT (OUTPATIENT)
Dept: FAMILY MEDICINE | Facility: CLINIC | Age: 33
End: 2023-07-14
Payer: COMMERCIAL

## 2023-07-14 VITALS
HEART RATE: 86 BPM | TEMPERATURE: 97.2 F | OXYGEN SATURATION: 99 % | BODY MASS INDEX: 37.95 KG/M2 | RESPIRATION RATE: 20 BRPM | SYSTOLIC BLOOD PRESSURE: 112 MMHG | DIASTOLIC BLOOD PRESSURE: 52 MMHG | WEIGHT: 227.8 LBS | HEIGHT: 65 IN

## 2023-07-14 DIAGNOSIS — O09.92 SUPERVISION OF HIGH RISK PREGNANCY IN SECOND TRIMESTER: Primary | ICD-10-CM

## 2023-07-14 LAB — AFP SERPL-MCNC: 35.5 NG/ML

## 2023-07-14 PROCEDURE — 36415 COLL VENOUS BLD VENIPUNCTURE: CPT | Performed by: NURSE PRACTITIONER

## 2023-07-14 PROCEDURE — 99207 PR PRENATAL VISIT: CPT | Performed by: NURSE PRACTITIONER

## 2023-07-14 ASSESSMENT — PAIN SCALES - GENERAL: PAINLEVEL: NO PAIN (0)

## 2023-07-14 NOTE — PROGRESS NOTES
Pregnancy risks              1 Obesity:  BMI 35-39.9.  antepartum testing 37 weeks once weekly, consdier growth us at 32 weeks, Goal for weight gain during the pregnancy is 11-20 pounds                2.  KEMAR- stopped buspar- states she used to take it prn and was helpful for her- discussed safety profile during pregnancy               3. Phentermine- stopped when had positive pregnancy test when she missed her period.   Found out pregnant 4/25.         Prenatal care plan              - OB labs:  Blood type A pos,  Not immune Rubella, all others are normal              - First trimester screening: NIPS nl              - Second trimester screening:  Discussed will do AFP today              - Pain management: epidural              - Ped: ROSA Leblanc              - BC: does not want IUD              - Breast feeding: going to try breastfeeding              - Covid 19 vaccine: declined    Delivered at [facility] with [facility]wives, has two girls and a boy at home- healthy term pregnancies    Concerns:   Doing well.  No concerns today.  Discussed anenatal screening.  She accepts testing at this time.  Reportable signs and symptoms discussed.  Will schedule anatomy ultrasound.  RTC 4 weeks      Sruthi Martin NP

## 2023-07-15 NOTE — ADDENDUM NOTE
Addended by: MEAGAN MURRAY on: 7/14/2023 07:15 PM     Modules accepted: Orders, Level of Service, SmartSet

## 2023-08-11 ENCOUNTER — HOSPITAL ENCOUNTER (OUTPATIENT)
Dept: ULTRASOUND IMAGING | Facility: CLINIC | Age: 33
Discharge: HOME | End: 2023-08-11
Attending: NURSE PRACTITIONER | Admitting: NURSE PRACTITIONER
Payer: COMMERCIAL

## 2023-08-11 DIAGNOSIS — O09.92 SUPERVISION OF HIGH RISK PREGNANCY IN SECOND TRIMESTER: ICD-10-CM

## 2023-08-11 PROCEDURE — 76805 OB US >/= 14 WKS SNGL FETUS: CPT

## 2023-08-17 ENCOUNTER — PRENATAL OFFICE VISIT (OUTPATIENT)
Dept: FAMILY MEDICINE | Facility: CLINIC | Age: 33
End: 2023-08-17
Payer: COMMERCIAL

## 2023-08-17 VITALS
BODY MASS INDEX: 38.11 KG/M2 | WEIGHT: 229 LBS | DIASTOLIC BLOOD PRESSURE: 58 MMHG | TEMPERATURE: 97.3 F | HEART RATE: 66 BPM | SYSTOLIC BLOOD PRESSURE: 112 MMHG | RESPIRATION RATE: 16 BRPM | OXYGEN SATURATION: 97 %

## 2023-08-17 DIAGNOSIS — O09.92 SUPERVISION OF HIGH RISK PREGNANCY IN SECOND TRIMESTER: Primary | ICD-10-CM

## 2023-08-17 PROCEDURE — 99207 PR PRENATAL VISIT: CPT | Performed by: NURSE PRACTITIONER

## 2023-08-17 ASSESSMENT — PAIN SCALES - GENERAL: PAINLEVEL: NO PAIN (0)

## 2023-08-17 NOTE — PROGRESS NOTES
Pregnancy risks              1 Obesity:  BMI 35-39.9.  antepartum testing 37 weeks once weekly, consdier growth us at 32 weeks, Goal for weight gain during the pregnancy is 11-20 pounds                2.  KEMAR- stopped buspar- states she used to take it prn and was helpful for her- discussed safety profile during pregnancy               3. Phentermine- stopped when had positive pregnancy test when she missed her period.   Found out pregnant .         Prenatal care plan              - OB labs:  Blood type A pos,  Not immune Rubella, all others are normal              - First trimester screening: NIPS nl              - Second trimester screening:  Discussed will do AFP today              - Pain management: epidural              - Ped: ROSA Leblanc              - BC: does not want IUD              - Breast feeding: going to try breastfeeding              - Covid 19 vaccine: declined     Delivered at [facility] with Midwives, has two girls and a boy at home- healthy term pregnancies    Concerns today:   Doing well.  No concerns today.  No nausea/vomiting. No heartburn.  No vaginal bleeding, no contractions/severe cramping, no leakage of fluid.  No vaginal discharge. No dysuria  No headache, vision changes, lower extremity swelling, upper abdominal pain, chest pain, shortness of breath.   Discussed  screening.  She declines testing at this time.  Discussed kick counts and fetal movement.  Reportable signs and symptoms discussed.    20 wk need additional views.      Sruthi Martin NP

## 2023-08-18 ENCOUNTER — HOSPITAL ENCOUNTER (OUTPATIENT)
Dept: ULTRASOUND IMAGING | Facility: CLINIC | Age: 33
Discharge: HOME | End: 2023-08-18
Attending: NURSE PRACTITIONER | Admitting: NURSE PRACTITIONER
Payer: COMMERCIAL

## 2023-08-18 PROCEDURE — 76816 OB US FOLLOW-UP PER FETUS: CPT

## 2023-09-15 ENCOUNTER — PRENATAL OFFICE VISIT (OUTPATIENT)
Dept: FAMILY MEDICINE | Facility: CLINIC | Age: 33
End: 2023-09-15
Payer: COMMERCIAL

## 2023-09-15 VITALS
WEIGHT: 231.5 LBS | TEMPERATURE: 97.1 F | SYSTOLIC BLOOD PRESSURE: 110 MMHG | BODY MASS INDEX: 38.57 KG/M2 | RESPIRATION RATE: 16 BRPM | OXYGEN SATURATION: 99 % | HEIGHT: 65 IN | DIASTOLIC BLOOD PRESSURE: 58 MMHG | HEART RATE: 82 BPM

## 2023-09-15 DIAGNOSIS — O35.EXX0 PYELECTASIS OF FETUS ON PRENATAL ULTRASOUND: ICD-10-CM

## 2023-09-15 DIAGNOSIS — O09.92 SUPERVISION OF HIGH RISK PREGNANCY IN SECOND TRIMESTER: Primary | ICD-10-CM

## 2023-09-15 DIAGNOSIS — E66.01 MORBID OBESITY (H): ICD-10-CM

## 2023-09-15 DIAGNOSIS — K21.9 GASTROESOPHAGEAL REFLUX DISEASE, UNSPECIFIED WHETHER ESOPHAGITIS PRESENT: ICD-10-CM

## 2023-09-15 PROCEDURE — 99207 PR COMPLICATED OB VISIT: CPT | Performed by: FAMILY MEDICINE

## 2023-09-15 NOTE — PROGRESS NOTES
Doing well except still taking zofran daily due to nausea, described it more like acid reflux. Will try her on omeprazole.   Had normal follow up anatomy screen but has fetal renal pelvis dilation, has follow up sono scheduled for October.   Will do routine labs next time, orders in.   Also offer Tdap at that time and flu shot if desired.   Would also like to get growth sonos in 3rd trimester due to maternal obesity.   Discussed daily fetal movement.   Will follow up in 4 weeks, sooner prn.   Monica Diehl MD

## 2023-09-17 PROBLEM — K21.9 GASTROESOPHAGEAL REFLUX DISEASE, UNSPECIFIED WHETHER ESOPHAGITIS PRESENT: Status: ACTIVE | Noted: 2023-09-17

## 2023-09-17 PROBLEM — E66.01 MORBID OBESITY (H): Status: ACTIVE | Noted: 2023-09-17

## 2023-09-17 PROBLEM — O03.1 INCOMPLETE ABORTION WITH DELAYED OR EXCESSIVE HEMORRHAGE: Status: RESOLVED | Noted: 2020-01-20 | Resolved: 2023-09-17

## 2023-09-17 PROBLEM — O35.EXX0 PYELECTASIS OF FETUS ON PRENATAL ULTRASOUND: Status: ACTIVE | Noted: 2023-09-17

## 2023-09-17 PROBLEM — O09.92 SUPERVISION OF HIGH RISK PREGNANCY IN SECOND TRIMESTER: Status: ACTIVE | Noted: 2023-09-17

## 2023-10-13 ENCOUNTER — PRENATAL OFFICE VISIT (OUTPATIENT)
Dept: FAMILY MEDICINE | Facility: CLINIC | Age: 33
End: 2023-10-13
Payer: COMMERCIAL

## 2023-10-13 VITALS
DIASTOLIC BLOOD PRESSURE: 54 MMHG | SYSTOLIC BLOOD PRESSURE: 104 MMHG | RESPIRATION RATE: 20 BRPM | BODY MASS INDEX: 38.72 KG/M2 | WEIGHT: 232.4 LBS | HEIGHT: 65 IN | TEMPERATURE: 97 F | HEART RATE: 72 BPM | OXYGEN SATURATION: 97 %

## 2023-10-13 DIAGNOSIS — O09.92 SUPERVISION OF HIGH RISK PREGNANCY IN SECOND TRIMESTER: ICD-10-CM

## 2023-10-13 DIAGNOSIS — O09.93 SUPERVISION OF HIGH RISK PREGNANCY IN THIRD TRIMESTER: Primary | ICD-10-CM

## 2023-10-13 LAB
GLUCOSE 1H P 50 G GLC PO SERPL-MCNC: 138 MG/DL (ref 70–129)
HGB BLD-MCNC: 12.8 G/DL (ref 11.7–15.7)
T PALLIDUM AB SER QL: NONREACTIVE

## 2023-10-13 PROCEDURE — 90686 IIV4 VACC NO PRSV 0.5 ML IM: CPT | Performed by: NURSE PRACTITIONER

## 2023-10-13 PROCEDURE — 90471 IMMUNIZATION ADMIN: CPT | Performed by: NURSE PRACTITIONER

## 2023-10-13 PROCEDURE — 86780 TREPONEMA PALLIDUM: CPT | Performed by: NURSE PRACTITIONER

## 2023-10-13 PROCEDURE — 36415 COLL VENOUS BLD VENIPUNCTURE: CPT | Performed by: NURSE PRACTITIONER

## 2023-10-13 PROCEDURE — 82950 GLUCOSE TEST: CPT | Performed by: NURSE PRACTITIONER

## 2023-10-13 PROCEDURE — 90472 IMMUNIZATION ADMIN EACH ADD: CPT | Performed by: NURSE PRACTITIONER

## 2023-10-13 PROCEDURE — 99207 PR PRENATAL VISIT: CPT | Performed by: NURSE PRACTITIONER

## 2023-10-13 PROCEDURE — 90715 TDAP VACCINE 7 YRS/> IM: CPT | Performed by: NURSE PRACTITIONER

## 2023-10-13 ASSESSMENT — PAIN SCALES - GENERAL: PAINLEVEL: NO PAIN (0)

## 2023-10-13 NOTE — PROGRESS NOTES
Pregnancy risks              1 Obesity:  BMI 35-39.9.  antepartum testing 37 weeks once weekly, growth us at 32 weeks, Goal for weight gain during the pregnancy is 11-20 pounds                2.  KEMAR- stopped buspar- states she used to take it prn and was helpful for her- discussed safety profile during pregnancy               3. Phentermine- stopped when had positive pregnancy test when she missed her period.   Found out pregnant 4/25.                  4.  GERD- improved on omeprazole               5. Fetal pyelectasis:  Bilateral dilatation of the renal pelves measuring 5 mm on the right and 6 mm on the left. Recheck at 32 wk us     Prenatal care plan              - OB labs:  Blood type A pos,  Not immune Rubella, all others are normal              - First trimester screening: NIPS nl              - Second trimester screening: declined              - Pain management: epidural              - Ped: ROSA Leblanc              - BC: does not want IUD              - Breast feeding: going to try breastfeeding              - Covid 19 vaccine: declined              - flu: 10/13/23              - Tdap: 10/13/23              - consider RSV at 32-36 weeks if available.       Delivered at [facility] with Midwives, has two girls and a boy at home- healthy term pregnancies            Concerns: some bilateral hip pain- mostly when trying to sleep.  Feels is holding stomach up a lot    Does not throw up- feels will try stopping zofran. resolved with omeprazole  No vaginal bleeding, LOF.  No contractions.  Discussed kick counts and fetal movement.  Reportable signs and symptoms discussed.  Discussed kick counts and fetal movement.  Discussed PTL, PROM, and when to call or come in.  RTC 2 weeks.    Trial stopping zofran as symptoms resolved with adding omeprazole  Trial belly band  Referral to Dr. Steven chiropractor for hip pain  Tdap and flu today along with labs  Is measuring ahead- has growth us scheduled for 32  monae Martin, NP

## 2023-10-16 ENCOUNTER — MYC MEDICAL ADVICE (OUTPATIENT)
Dept: FAMILY MEDICINE | Facility: CLINIC | Age: 33
End: 2023-10-16
Payer: COMMERCIAL

## 2023-10-19 DIAGNOSIS — O99.810 ABNORMAL MATERNAL GLUCOSE TOLERANCE, ANTEPARTUM: Primary | ICD-10-CM

## 2023-10-19 NOTE — RESULT ENCOUNTER NOTE
Kassandra, here are your lab results. As you can see, you did NOT pass the diabetes test. Your test for syphilis and hemoglobin are normal.   I would like you to have the 3 hour glucose test to further check for pregnancy related diabetes. I have placed a lab order for that and you can call to set up a lab appointment in the next week.   Monica Diehl MD

## 2023-10-20 ENCOUNTER — MYC MEDICAL ADVICE (OUTPATIENT)
Dept: FAMILY MEDICINE | Facility: CLINIC | Age: 33
End: 2023-10-20

## 2023-10-20 ENCOUNTER — LAB (OUTPATIENT)
Dept: LAB | Facility: CLINIC | Age: 33
End: 2023-10-20
Payer: COMMERCIAL

## 2023-10-20 DIAGNOSIS — O99.810 ABNORMAL MATERNAL GLUCOSE TOLERANCE, ANTEPARTUM: ICD-10-CM

## 2023-10-20 LAB
GESTATIONAL GTT 1 HR POST DOSE: 151 MG/DL (ref 60–179)
GESTATIONAL GTT 2 HR POST DOSE: 113 MG/DL (ref 60–154)
GESTATIONAL GTT 3 HR POST DOSE: 61 MG/DL (ref 60–139)
GLUCOSE P FAST SERPL-MCNC: 98 MG/DL (ref 60–94)

## 2023-10-20 PROCEDURE — 82952 GTT-ADDED SAMPLES: CPT

## 2023-10-20 PROCEDURE — 36415 COLL VENOUS BLD VENIPUNCTURE: CPT

## 2023-10-20 PROCEDURE — 82951 GLUCOSE TOLERANCE TEST (GTT): CPT

## 2023-10-27 ENCOUNTER — PRENATAL OFFICE VISIT (OUTPATIENT)
Dept: FAMILY MEDICINE | Facility: CLINIC | Age: 33
End: 2023-10-27
Payer: COMMERCIAL

## 2023-10-27 ENCOUNTER — HOSPITAL ENCOUNTER (OUTPATIENT)
Dept: ULTRASOUND IMAGING | Facility: CLINIC | Age: 33
Discharge: HOME | End: 2023-10-27
Attending: NURSE PRACTITIONER | Admitting: NURSE PRACTITIONER
Payer: COMMERCIAL

## 2023-10-27 VITALS
SYSTOLIC BLOOD PRESSURE: 106 MMHG | HEART RATE: 82 BPM | RESPIRATION RATE: 18 BRPM | OXYGEN SATURATION: 96 % | TEMPERATURE: 97.2 F | HEIGHT: 65 IN | WEIGHT: 231 LBS | BODY MASS INDEX: 38.49 KG/M2 | DIASTOLIC BLOOD PRESSURE: 56 MMHG

## 2023-10-27 DIAGNOSIS — O09.93 SUPERVISION OF HIGH RISK PREGNANCY IN THIRD TRIMESTER: Primary | ICD-10-CM

## 2023-10-27 DIAGNOSIS — O09.92 SUPERVISION OF HIGH RISK PREGNANCY IN SECOND TRIMESTER: ICD-10-CM

## 2023-10-27 PROCEDURE — 76819 FETAL BIOPHYS PROFIL W/O NST: CPT

## 2023-10-27 PROCEDURE — 99207 PR PRENATAL VISIT: CPT | Performed by: NURSE PRACTITIONER

## 2023-10-27 ASSESSMENT — PAIN SCALES - GENERAL: PAINLEVEL: NO PAIN (0)

## 2023-10-27 NOTE — PROGRESS NOTES
Pregnancy risks              1 Obesity:  BMI 35-39.9.  antepartum testing 37 weeks once weekly, growth us at 32 weeks, Goal for weight gain during the pregnancy is 11-20 pounds                2.  KEMAR- stopped buspar- states she used to take it prn and was helpful for her- discussed safety profile during pregnancy               3. Phentermine- stopped when had positive pregnancy test when she missed her period.   Found out pregnant 4/25.                  4.  GERD- improved on omeprazole/ stopped zofran               5. Fetal pyelectasis:  Bilateral dilatation of the renal pelves measuring 5 mm on the right and 6 mm on the left. Recheck at 32 wk us     Prenatal care plan              - OB labs:  Blood type A pos,  Not immune Rubella, all others are normal              - First trimester screening: NIPS nl              - Second trimester screening: declined              - Pain management: epidural              - Ped: ROSA Leblanc              - BC: does not want IUD              - Breast feeding: going to try pump and bottle feed              - Covid 19 vaccine: declined              - flu: 10/13/23              - Tdap: 10/13/23              - consider RSV at 32-36 weeks if available.       Delivered at [facility] with Midwives, has two girls and a boy at home- healthy term pregnancies    Concerns: no concerns at this time.  Saw Dr. Steven for low back pain- was helpful  Doing well.  No concerns today.  No vaginal bleeding, LOF.  No contractions.  Cephalic position confirmed by Leopold maneuvers.  Discussed kick counts and fetal movement.  Reportable signs and symptoms discussed.  Discussed kick counts and fetal movement.  Discussed PTL, PROM, and when to call or come in.  RTC 2 weeks.    Growth us today- results pending    Sruthi Martin NP

## 2023-11-10 ENCOUNTER — PRENATAL OFFICE VISIT (OUTPATIENT)
Dept: FAMILY MEDICINE | Facility: CLINIC | Age: 33
End: 2023-11-10
Payer: COMMERCIAL

## 2023-11-10 VITALS
HEIGHT: 65 IN | BODY MASS INDEX: 38.72 KG/M2 | HEART RATE: 85 BPM | DIASTOLIC BLOOD PRESSURE: 64 MMHG | OXYGEN SATURATION: 95 % | RESPIRATION RATE: 18 BRPM | SYSTOLIC BLOOD PRESSURE: 108 MMHG | WEIGHT: 232.4 LBS | TEMPERATURE: 97.3 F

## 2023-11-10 DIAGNOSIS — O09.93 SUPERVISION OF HIGH RISK PREGNANCY IN THIRD TRIMESTER: Primary | ICD-10-CM

## 2023-11-10 PROCEDURE — 99207 PR PRENATAL VISIT: CPT | Performed by: NURSE PRACTITIONER

## 2023-11-10 PROCEDURE — 90471 IMMUNIZATION ADMIN: CPT | Performed by: NURSE PRACTITIONER

## 2023-11-10 PROCEDURE — 90678 RSV VACC PREF BIVALENT IM: CPT | Performed by: NURSE PRACTITIONER

## 2023-11-10 PROCEDURE — 99213 OFFICE O/P EST LOW 20 MIN: CPT | Mod: 25 | Performed by: NURSE PRACTITIONER

## 2023-11-10 RX ORDER — ALBUTEROL SULFATE 90 UG/1
2 AEROSOL, METERED RESPIRATORY (INHALATION) EVERY 6 HOURS PRN
Qty: 18 G | Refills: 0 | Status: SHIPPED | OUTPATIENT
Start: 2023-11-10

## 2023-11-10 RX ORDER — AMOXICILLIN 875 MG
875 TABLET ORAL 2 TIMES DAILY
Qty: 20 TABLET | Refills: 0 | Status: SHIPPED | OUTPATIENT
Start: 2023-11-10 | End: 2023-11-20

## 2023-11-10 ASSESSMENT — PAIN SCALES - GENERAL: PAINLEVEL: NO PAIN (0)

## 2023-11-10 NOTE — PROGRESS NOTES
Pregnancy risks              1 Obesity:  BMI 35-39.9.  antepartum testing 37 weeks once weekly, growth us at 32 weeks, Goal for weight gain during the pregnancy is 11-20 pounds                2.  KEMAR- stopped buspar- states she used to take it prn and was helpful for her- discussed safety profile during pregnancy               3. Phentermine- stopped when had positive pregnancy test when she missed her period.   Found out pregnant 4/25.                  4.  GERD- improved on omeprazole/ stopped zofran               5. Fetal pyelectasis:  Bilateral dilatation of the renal pelves measuring 5 mm on the right and 6 mm on the left. Not mentioned on 32 wk, recheck with 36 wk     Prenatal care plan              - OB labs:  Blood type A pos,  Not immune Rubella, all others are normal              - First trimester screening: NIPS nl              - Second trimester screening: declined              - Pain management: epidural              - Ped: ROSA Leblanc              - BC: does not want IUD              - Breast feeding: going to try pump and bottle feed              - Covid 19 vaccine: declined              - flu: 10/13/23              - Tdap: 10/13/23              - RSV: 11/10/23    Concerns:   Wet cough at night.  Never diagnosed with asthma thought had until she quit smoking- no real issues since she quit smoking a year ago.  No fever, chills, sweats.  No one else at home is sick.  Has sinus congestion and pressure- is 3 weeks now- feels is improving- was worse initially and did covid test with sore throat.    No vaginal bleeding, LOF.  No contractions.  Cephalic position confirmed by Leopold maneuvers.  Discussed kick counts and fetal movement.  Reportable signs and symptoms discussed.  Discussed kick counts and fetal movement.  Discussed PTL, PROM, and when to call or come in.  RTC 2 weeks.    Blood pressure 108/64, pulse 85, temperature 97.3  F (36.3  C), temperature source Temporal, resp. rate 18, height 1.651  "m (5' 5\"), weight 105.4 kg (232 lb 6.4 oz), last menstrual period 03/09/2023, SpO2 95%, not currently breastfeeding.  General:Pleasant 33 year old, female resting comfortably no apparent distress  Head: atraumatic   Eyes: conjunctivae normal, pupils equal and reactive, EOMs intact    ENT: Ears- no deformities, canals clear, TM's pearly gray, clear; Nose- mucosa/septum/turbinates normal; Oropharynx- normal without lesions, tonsils normal, tonsilar pilars normal  Neck: supple, nontender, no masses or thyromegaly; no carotid bruits   Chest: few respiratory wheezes, no rales, rhonchi  Good effort, respirations easy  Cardiovascular: RRR, S1, S2, no murmur, peripheral pulses normal  Skin: normal to inspection  Lymph: no cervical, axillary lymphadenopathy    Plan: cough/ sinus pressure/ drainage- has had inhaler and worked up for asthma in the past- with wheezing and duration of symptoms will treat with amoxicillin and albuterol along with supportive cares.        Sruthi Martin NP      "

## 2023-11-20 ENCOUNTER — MYC MEDICAL ADVICE (OUTPATIENT)
Dept: FAMILY MEDICINE | Facility: CLINIC | Age: 33
End: 2023-11-20
Payer: COMMERCIAL

## 2023-11-20 DIAGNOSIS — N89.8 VAGINAL DISCHARGE: Primary | ICD-10-CM

## 2023-11-21 NOTE — TELEPHONE ENCOUNTER
Was given amoxicillin on 11/10/2023.  Would you be willing to prescribe diflucan or would you like a virtual visit?    Francis Perdomo, MSN, APRN, FNP-C  [facility] ~ Registered Nurse  Clinic Triage ~ [facility]  November 21, 2023

## 2023-11-24 ENCOUNTER — HOSPITAL ENCOUNTER (OUTPATIENT)
Dept: ULTRASOUND IMAGING | Facility: CLINIC | Age: 33
Discharge: HOME | End: 2023-11-24
Attending: NURSE PRACTITIONER | Admitting: NURSE PRACTITIONER
Payer: COMMERCIAL

## 2023-11-24 ENCOUNTER — PRENATAL OFFICE VISIT (OUTPATIENT)
Dept: FAMILY MEDICINE | Facility: CLINIC | Age: 33
End: 2023-11-24
Payer: COMMERCIAL

## 2023-11-24 ENCOUNTER — MYC MEDICAL ADVICE (OUTPATIENT)
Dept: FAMILY MEDICINE | Facility: CLINIC | Age: 33
End: 2023-11-24

## 2023-11-24 VITALS
OXYGEN SATURATION: 97 % | WEIGHT: 233.8 LBS | TEMPERATURE: 97.8 F | RESPIRATION RATE: 18 BRPM | HEIGHT: 65 IN | HEART RATE: 81 BPM | SYSTOLIC BLOOD PRESSURE: 112 MMHG | BODY MASS INDEX: 38.95 KG/M2 | DIASTOLIC BLOOD PRESSURE: 68 MMHG

## 2023-11-24 DIAGNOSIS — O09.93 SUPERVISION OF HIGH RISK PREGNANCY IN THIRD TRIMESTER: Primary | ICD-10-CM

## 2023-11-24 DIAGNOSIS — O09.93 SUPERVISION OF HIGH RISK PREGNANCY IN THIRD TRIMESTER: ICD-10-CM

## 2023-11-24 DIAGNOSIS — O36.63X0 FETAL MACROSOMIA DURING PREGNANCY IN THIRD TRIMESTER, SINGLE OR UNSPECIFIED FETUS: ICD-10-CM

## 2023-11-24 PROBLEM — O35.EXX0 PYELECTASIS OF FETUS ON PRENATAL ULTRASOUND: Status: RESOLVED | Noted: 2023-09-17 | Resolved: 2023-11-24

## 2023-11-24 PROCEDURE — 76819 FETAL BIOPHYS PROFIL W/O NST: CPT

## 2023-11-24 PROCEDURE — 87653 STREP B DNA AMP PROBE: CPT | Performed by: NURSE PRACTITIONER

## 2023-11-24 PROCEDURE — 76816 OB US FOLLOW-UP PER FETUS: CPT

## 2023-11-24 PROCEDURE — 99207 PR PRENATAL VISIT: CPT | Performed by: NURSE PRACTITIONER

## 2023-11-24 ASSESSMENT — PAIN SCALES - GENERAL: PAINLEVEL: NO PAIN (0)

## 2023-11-24 NOTE — PROGRESS NOTES
Pregnancy risks              1 Obesity:  BMI 35-39.9.  antepartum testing 37 weeks once weekly, growth us at 32 weeks, Goal for weight gain during the pregnancy is 11-20 pounds                2.  KEMAR- stopped buspar- states she used to take it prn and was helpful for her- discussed safety profile during pregnancy               3. Phentermine- stopped when had positive pregnancy test when she missed her period.   Found out pregnant 4/25.                  4.  GERD- improved on omeprazole/ stopped zofran               5. Fetal pyelectasis:  Bilateral dilatation of the renal pelves measuring 5 mm on the right and 6 mm on the left. Not mentioned on 32 wk, resolved with 36 wk     Prenatal care plan              - OB labs:  Blood type A pos,  Not immune Rubella, all others are normal              - First trimester screening: NIPS nl              - Second trimester screening: declined              - Pain management: epidural              - Ped: ROSA Leblanc              - BC: does not want IUD              - Breast feeding: going to try pump and bottle feed              - Covid 19 vaccine: declined              - flu: 10/13/23              - Tdap: 10/13/23              - RSV: 11/10/23              - [facility]red [facility]    Concerns:   No vaginal bleeding, LOF, contractions.  No HA, RUQ pain, N/V, visual changes.  Cervix is posterior, finger-tip dilated and soft.  Cephalic position confirmed by Leopold maneuvers.  Discussed signs of labor and when to call or come in.  Discussed kick counts and fetal movement.  Reportable signs and symptoms discussed.  GBS done today.  Labor precautions discussed.  RTC 1 week.  Prenatal flowsheet information is reviewed.    Growth/ bpp today with AC >98% at 36.2cm but radiologist notes likely artifactual due to fetal position.  Bpp normal.  Will recheck growth in 3 weeks 38week us.  Discussed risks for labor and delivery and post partum with possible macrosomia    Sruthi Martin,  NP

## 2023-11-25 LAB — GP B STREP DNA SPEC QL NAA+PROBE: NEGATIVE

## 2023-12-01 ENCOUNTER — PRENATAL OFFICE VISIT (OUTPATIENT)
Dept: FAMILY MEDICINE | Facility: CLINIC | Age: 33
End: 2023-12-01
Payer: COMMERCIAL

## 2023-12-01 ENCOUNTER — MEDICAL CORRESPONDENCE (OUTPATIENT)
Dept: HEALTH INFORMATION MANAGEMENT | Facility: CLINIC | Age: 33
End: 2023-12-01

## 2023-12-01 VITALS
OXYGEN SATURATION: 98 % | RESPIRATION RATE: 18 BRPM | BODY MASS INDEX: 38.32 KG/M2 | HEART RATE: 88 BPM | HEIGHT: 65 IN | WEIGHT: 230 LBS | DIASTOLIC BLOOD PRESSURE: 66 MMHG | TEMPERATURE: 97.8 F | SYSTOLIC BLOOD PRESSURE: 108 MMHG

## 2023-12-01 DIAGNOSIS — O09.93 SUPERVISION OF HIGH RISK PREGNANCY IN THIRD TRIMESTER: Primary | ICD-10-CM

## 2023-12-01 DIAGNOSIS — E66.01 MORBID OBESITY (H): ICD-10-CM

## 2023-12-01 PROCEDURE — 99207 PR COMPLICATED OB VISIT: CPT | Performed by: FAMILY MEDICINE

## 2023-12-01 ASSESSMENT — PAIN SCALES - GENERAL: PAINLEVEL: NO PAIN (0)

## 2023-12-06 ENCOUNTER — MYC REFILL (OUTPATIENT)
Dept: FAMILY MEDICINE | Facility: CLINIC | Age: 33
End: 2023-12-06
Payer: COMMERCIAL

## 2023-12-06 DIAGNOSIS — K21.9 GASTROESOPHAGEAL REFLUX DISEASE, UNSPECIFIED WHETHER ESOPHAGITIS PRESENT: ICD-10-CM

## 2023-12-08 ENCOUNTER — HOSPITAL ENCOUNTER (OUTPATIENT)
Dept: ULTRASOUND IMAGING | Facility: CLINIC | Age: 33
Discharge: HOME | End: 2023-12-08
Attending: NURSE PRACTITIONER | Admitting: NURSE PRACTITIONER
Payer: COMMERCIAL

## 2023-12-08 ENCOUNTER — PRENATAL OFFICE VISIT (OUTPATIENT)
Dept: FAMILY MEDICINE | Facility: CLINIC | Age: 33
End: 2023-12-08
Payer: COMMERCIAL

## 2023-12-08 VITALS
DIASTOLIC BLOOD PRESSURE: 70 MMHG | HEART RATE: 80 BPM | SYSTOLIC BLOOD PRESSURE: 108 MMHG | RESPIRATION RATE: 20 BRPM | WEIGHT: 235 LBS | TEMPERATURE: 97.6 F | BODY MASS INDEX: 39.11 KG/M2 | OXYGEN SATURATION: 97 %

## 2023-12-08 DIAGNOSIS — E66.01 MORBID OBESITY (H): ICD-10-CM

## 2023-12-08 DIAGNOSIS — O09.93 SUPERVISION OF HIGH RISK PREGNANCY IN THIRD TRIMESTER: Primary | ICD-10-CM

## 2023-12-08 DIAGNOSIS — O09.92 SUPERVISION OF HIGH RISK PREGNANCY IN SECOND TRIMESTER: ICD-10-CM

## 2023-12-08 PROCEDURE — 76819 FETAL BIOPHYS PROFIL W/O NST: CPT

## 2023-12-08 PROCEDURE — 99207 PR COMPLICATED OB VISIT: CPT | Performed by: FAMILY MEDICINE

## 2023-12-08 ASSESSMENT — PAIN SCALES - GENERAL: PAINLEVEL: NO PAIN (0)

## 2023-12-11 NOTE — PROGRESS NOTES
Doing well overall.  Had some spotting on Wednesday and clear discharge.   No odor. No regular ctx.   Baby active.   Feels pressure on her left side with standing and walking.   Declines cervix check today.   GBS was negative.   Reviewed birth plan, see copy in chart and will keep a copy and bring along to birth place.   Will consider cervix check next week.   Had growth ultrasound last week, baby measuring large at 92%, AC was large but felt to possibly be artifact.   Will repeat growth ultrasound in 2 more weeks.   BPP weekly starting next week.   Discussed when to present for signs and symptoms of labor.   Discussed warning signs for preeclampsia.  Will f/u in 1 week or sooner with any concern for decreased fetal movement, vaginal bleeding, fluid leak, headache, vision change, severe nausea or vomiting, abdominal pain, increased edema or other concerns.   Monica Diehl MD

## 2023-12-11 NOTE — PROGRESS NOTES
Doing well overall.  Having some ongoing right sciatica.   No bleeding, had some frequent ctx yesterday, not continuing on regular basis.   Discussed elevated AC on last growth ultrasound. Won't  at this time but will follow, has repeat growth US next visit in 1 week.   Had BPP today, scored 8/8.   Maternal obesity, has had normal BP, TWG 15 lb. Passed 3 hour GTT.   Baby measuring at 92% EFW on last sono.   Consider induction at 39+ weeks based on maternal obesity and concern for fetal macrosomia.   Will recheck cervix next visit and further develop plan for induction.   Discussed when to present for signs and symptoms of labor.   Discussed warning signs for preeclampsia.  Will f/u sooner with any concern for decreased fetal movement, vaginal bleeding, fluid leak, headache, vision change, severe nausea or vomiting, abdominal pain, increased edema or other concerns.   Monica Diehl MD

## 2023-12-15 ENCOUNTER — HOSPITAL ENCOUNTER (OUTPATIENT)
Dept: ULTRASOUND IMAGING | Facility: CLINIC | Age: 33
Discharge: HOME | End: 2023-12-15
Attending: NURSE PRACTITIONER | Admitting: NURSE PRACTITIONER
Payer: COMMERCIAL

## 2023-12-15 ENCOUNTER — PRENATAL OFFICE VISIT (OUTPATIENT)
Dept: FAMILY MEDICINE | Facility: CLINIC | Age: 33
End: 2023-12-15
Payer: COMMERCIAL

## 2023-12-15 ENCOUNTER — TELEPHONE (OUTPATIENT)
Dept: FAMILY MEDICINE | Facility: CLINIC | Age: 33
End: 2023-12-15

## 2023-12-15 VITALS
DIASTOLIC BLOOD PRESSURE: 64 MMHG | TEMPERATURE: 97.3 F | SYSTOLIC BLOOD PRESSURE: 108 MMHG | HEIGHT: 65 IN | RESPIRATION RATE: 16 BRPM | BODY MASS INDEX: 39.32 KG/M2 | WEIGHT: 236 LBS | HEART RATE: 69 BPM | OXYGEN SATURATION: 98 %

## 2023-12-15 DIAGNOSIS — O36.63X0 MACROSOMIA OF FETUS AFFECTING MANAGEMENT OF MOTHER IN THIRD TRIMESTER, SINGLE OR UNSPECIFIED FETUS: ICD-10-CM

## 2023-12-15 DIAGNOSIS — O09.93 SUPERVISION OF HIGH RISK PREGNANCY IN THIRD TRIMESTER: Primary | ICD-10-CM

## 2023-12-15 DIAGNOSIS — O09.93 SUPERVISION OF HIGH RISK PREGNANCY IN THIRD TRIMESTER: ICD-10-CM

## 2023-12-15 PROCEDURE — 76819 FETAL BIOPHYS PROFIL W/O NST: CPT

## 2023-12-15 PROCEDURE — 76816 OB US FOLLOW-UP PER FETUS: CPT

## 2023-12-15 PROCEDURE — 99207 PR COMPLICATED OB VISIT: CPT | Performed by: FAMILY MEDICINE

## 2023-12-15 ASSESSMENT — PAIN SCALES - GENERAL: PAINLEVEL: NO PAIN (0)

## 2023-12-15 NOTE — PATIENT INSTRUCTIONS
Kassandra, the on-call doctor on Sunday night will be Dr. Mondragon, and on Monday it will be Dr. Enriquez.   So far it looks like a busy weekend, so there is a small chance your induction could get pushed back. As of now we're planning on having you come Sunday evening at 7:30 pm.   Please call the birthplace 1 hour ahead (6:30 pm) and make sure things are still on schedule. Usually if they have to push you back they'll call you, but just be on the safe side and call.   Good luck to you and welcome to your baby!    If you have questions you can also call the birthplace at [phone].

## 2023-12-17 ENCOUNTER — HOSPITAL ENCOUNTER (INPATIENT)
Facility: CLINIC | Age: 33
LOS: 2 days | Discharge: HOME | End: 2023-12-19
Attending: FAMILY MEDICINE | Admitting: FAMILY MEDICINE
Payer: COMMERCIAL

## 2023-12-17 DIAGNOSIS — O09.93 SUPERVISION OF HIGH RISK PREGNANCY IN THIRD TRIMESTER: Primary | ICD-10-CM

## 2023-12-17 PROBLEM — O36.63X0 MACROSOMIA OF FETUS AFFECTING MANAGEMENT OF MOTHER IN THIRD TRIMESTER, SINGLE OR UNSPECIFIED FETUS: Status: ACTIVE | Noted: 2023-12-17

## 2023-12-17 LAB
ABO/RH(D): NORMAL
ANTIBODY SCREEN: NEGATIVE
ERYTHROCYTE [DISTWIDTH] IN BLOOD BY AUTOMATED COUNT: 12.6 % (ref 10–15)
HCT VFR BLD AUTO: 35.3 % (ref 35–47)
HGB BLD-MCNC: 11.8 G/DL (ref 11.7–15.7)
MCH RBC QN AUTO: 28.5 PG (ref 26.5–33)
MCHC RBC AUTO-ENTMCNC: 33.4 G/DL (ref 31.5–36.5)
MCV RBC AUTO: 85 FL (ref 78–100)
PLATELET # BLD AUTO: 247 10E3/UL (ref 150–450)
RBC # BLD AUTO: 4.14 10E6/UL (ref 3.8–5.2)
SPECIMEN EXPIRATION DATE: NORMAL
WBC # BLD AUTO: 8.8 10E3/UL (ref 4–11)

## 2023-12-17 PROCEDURE — 250N000009 HC RX 250: Performed by: FAMILY MEDICINE

## 2023-12-17 PROCEDURE — 86900 BLOOD TYPING SEROLOGIC ABO: CPT | Performed by: FAMILY MEDICINE

## 2023-12-17 PROCEDURE — 250N000013 HC RX MED GY IP 250 OP 250 PS 637: Performed by: FAMILY MEDICINE

## 2023-12-17 PROCEDURE — 86780 TREPONEMA PALLIDUM: CPT | Performed by: FAMILY MEDICINE

## 2023-12-17 PROCEDURE — 3E0P7VZ INTRODUCTION OF HORMONE INTO FEMALE REPRODUCTIVE, VIA NATURAL OR ARTIFICIAL OPENING: ICD-10-PCS | Performed by: FAMILY MEDICINE

## 2023-12-17 PROCEDURE — 120N000001 HC R&B MED SURG/OB

## 2023-12-17 PROCEDURE — 36415 COLL VENOUS BLD VENIPUNCTURE: CPT | Performed by: FAMILY MEDICINE

## 2023-12-17 PROCEDURE — 85027 COMPLETE CBC AUTOMATED: CPT | Performed by: FAMILY MEDICINE

## 2023-12-17 RX ORDER — METOCLOPRAMIDE HYDROCHLORIDE 5 MG/ML
10 INJECTION INTRAMUSCULAR; INTRAVENOUS EVERY 6 HOURS PRN
Status: DISCONTINUED | OUTPATIENT
Start: 2023-12-17 | End: 2023-12-18

## 2023-12-17 RX ORDER — NALOXONE HYDROCHLORIDE 0.4 MG/ML
0.4 INJECTION, SOLUTION INTRAMUSCULAR; INTRAVENOUS; SUBCUTANEOUS
Status: CANCELLED | OUTPATIENT
Start: 2023-12-17

## 2023-12-17 RX ORDER — KETOROLAC TROMETHAMINE 30 MG/ML
30 INJECTION, SOLUTION INTRAMUSCULAR; INTRAVENOUS
Status: CANCELLED | OUTPATIENT
Start: 2023-12-17 | End: 2023-12-22

## 2023-12-17 RX ORDER — ONDANSETRON 4 MG/1
4 TABLET, ORALLY DISINTEGRATING ORAL EVERY 6 HOURS PRN
Status: CANCELLED | OUTPATIENT
Start: 2023-12-17

## 2023-12-17 RX ORDER — PROCHLORPERAZINE 25 MG
25 SUPPOSITORY, RECTAL RECTAL EVERY 12 HOURS PRN
Status: DISCONTINUED | OUTPATIENT
Start: 2023-12-17 | End: 2023-12-18

## 2023-12-17 RX ORDER — KETOROLAC TROMETHAMINE 30 MG/ML
30 INJECTION, SOLUTION INTRAMUSCULAR; INTRAVENOUS
Status: DISCONTINUED | OUTPATIENT
Start: 2023-12-17 | End: 2023-12-18

## 2023-12-17 RX ORDER — TRANEXAMIC ACID 10 MG/ML
1 INJECTION, SOLUTION INTRAVENOUS EVERY 30 MIN PRN
Status: DISCONTINUED | OUTPATIENT
Start: 2023-12-17 | End: 2023-12-18

## 2023-12-17 RX ORDER — TRANEXAMIC ACID 10 MG/ML
1 INJECTION, SOLUTION INTRAVENOUS EVERY 30 MIN PRN
Status: CANCELLED | OUTPATIENT
Start: 2023-12-17

## 2023-12-17 RX ORDER — OXYTOCIN 10 [USP'U]/ML
10 INJECTION, SOLUTION INTRAMUSCULAR; INTRAVENOUS
Status: DISCONTINUED | OUTPATIENT
Start: 2023-12-17 | End: 2023-12-18

## 2023-12-17 RX ORDER — MISOPROSTOL 200 UG/1
400 TABLET ORAL
Status: DISCONTINUED | OUTPATIENT
Start: 2023-12-17 | End: 2023-12-18

## 2023-12-17 RX ORDER — METOCLOPRAMIDE 5 MG/1
10 TABLET ORAL EVERY 6 HOURS PRN
Status: DISCONTINUED | OUTPATIENT
Start: 2023-12-17 | End: 2023-12-18

## 2023-12-17 RX ORDER — OXYTOCIN/0.9 % SODIUM CHLORIDE 30/500 ML
1-24 PLASTIC BAG, INJECTION (ML) INTRAVENOUS CONTINUOUS
Status: DISCONTINUED | OUTPATIENT
Start: 2023-12-17 | End: 2023-12-18

## 2023-12-17 RX ORDER — PROCHLORPERAZINE MALEATE 5 MG
10 TABLET ORAL EVERY 6 HOURS PRN
Status: CANCELLED | OUTPATIENT
Start: 2023-12-17

## 2023-12-17 RX ORDER — SODIUM CHLORIDE, SODIUM LACTATE, POTASSIUM CHLORIDE, CALCIUM CHLORIDE 600; 310; 30; 20 MG/100ML; MG/100ML; MG/100ML; MG/100ML
INJECTION, SOLUTION INTRAVENOUS CONTINUOUS PRN
Status: DISCONTINUED | OUTPATIENT
Start: 2023-12-17 | End: 2023-12-18

## 2023-12-17 RX ORDER — OXYTOCIN 10 [USP'U]/ML
10 INJECTION, SOLUTION INTRAMUSCULAR; INTRAVENOUS
Status: CANCELLED | OUTPATIENT
Start: 2023-12-17

## 2023-12-17 RX ORDER — NALOXONE HYDROCHLORIDE 0.4 MG/ML
0.4 INJECTION, SOLUTION INTRAMUSCULAR; INTRAVENOUS; SUBCUTANEOUS
Status: DISCONTINUED | OUTPATIENT
Start: 2023-12-17 | End: 2023-12-18

## 2023-12-17 RX ORDER — OXYTOCIN/0.9 % SODIUM CHLORIDE 30/500 ML
100-340 PLASTIC BAG, INJECTION (ML) INTRAVENOUS CONTINUOUS PRN
Status: DISCONTINUED | OUTPATIENT
Start: 2023-12-17 | End: 2023-12-18

## 2023-12-17 RX ORDER — NALOXONE HYDROCHLORIDE 0.4 MG/ML
0.2 INJECTION, SOLUTION INTRAMUSCULAR; INTRAVENOUS; SUBCUTANEOUS
Status: CANCELLED | OUTPATIENT
Start: 2023-12-17

## 2023-12-17 RX ORDER — CITRIC ACID/SODIUM CITRATE 334-500MG
30 SOLUTION, ORAL ORAL
Status: CANCELLED | OUTPATIENT
Start: 2023-12-17

## 2023-12-17 RX ORDER — CITRIC ACID/SODIUM CITRATE 334-500MG
30 SOLUTION, ORAL ORAL
Status: DISCONTINUED | OUTPATIENT
Start: 2023-12-17 | End: 2023-12-18

## 2023-12-17 RX ORDER — ONDANSETRON 2 MG/ML
4 INJECTION INTRAMUSCULAR; INTRAVENOUS EVERY 6 HOURS PRN
Status: CANCELLED | OUTPATIENT
Start: 2023-12-17

## 2023-12-17 RX ORDER — CARBOPROST TROMETHAMINE 250 UG/ML
250 INJECTION, SOLUTION INTRAMUSCULAR
Status: CANCELLED | OUTPATIENT
Start: 2023-12-17

## 2023-12-17 RX ORDER — MISOPROSTOL 100 UG/1
25 TABLET ORAL EVERY 4 HOURS PRN
Status: DISCONTINUED | OUTPATIENT
Start: 2023-12-17 | End: 2023-12-18

## 2023-12-17 RX ORDER — SODIUM CHLORIDE, SODIUM LACTATE, POTASSIUM CHLORIDE, CALCIUM CHLORIDE 600; 310; 30; 20 MG/100ML; MG/100ML; MG/100ML; MG/100ML
INJECTION, SOLUTION INTRAVENOUS CONTINUOUS PRN
Status: CANCELLED | OUTPATIENT
Start: 2023-12-17

## 2023-12-17 RX ORDER — PROCHLORPERAZINE MALEATE 5 MG
10 TABLET ORAL EVERY 6 HOURS PRN
Status: DISCONTINUED | OUTPATIENT
Start: 2023-12-17 | End: 2023-12-18

## 2023-12-17 RX ORDER — METHYLERGONOVINE MALEATE 0.2 MG/ML
200 INJECTION INTRAVENOUS
Status: CANCELLED | OUTPATIENT
Start: 2023-12-17

## 2023-12-17 RX ORDER — MISOPROSTOL 100 UG/1
25 TABLET ORAL EVERY 4 HOURS PRN
Status: CANCELLED | OUTPATIENT
Start: 2023-12-17

## 2023-12-17 RX ORDER — CARBOPROST TROMETHAMINE 250 UG/ML
250 INJECTION, SOLUTION INTRAMUSCULAR
Status: DISCONTINUED | OUTPATIENT
Start: 2023-12-17 | End: 2023-12-18

## 2023-12-17 RX ORDER — FENTANYL CITRATE 50 UG/ML
100 INJECTION, SOLUTION INTRAMUSCULAR; INTRAVENOUS
Status: DISCONTINUED | OUTPATIENT
Start: 2023-12-17 | End: 2023-12-18

## 2023-12-17 RX ORDER — OXYTOCIN/0.9 % SODIUM CHLORIDE 30/500 ML
340 PLASTIC BAG, INJECTION (ML) INTRAVENOUS CONTINUOUS PRN
Status: CANCELLED | OUTPATIENT
Start: 2023-12-17

## 2023-12-17 RX ORDER — OXYTOCIN/0.9 % SODIUM CHLORIDE 30/500 ML
1-24 PLASTIC BAG, INJECTION (ML) INTRAVENOUS CONTINUOUS
Status: CANCELLED | OUTPATIENT
Start: 2023-12-17

## 2023-12-17 RX ORDER — METOCLOPRAMIDE HYDROCHLORIDE 5 MG/ML
10 INJECTION INTRAMUSCULAR; INTRAVENOUS EVERY 6 HOURS PRN
Status: CANCELLED | OUTPATIENT
Start: 2023-12-17

## 2023-12-17 RX ORDER — NALOXONE HYDROCHLORIDE 0.4 MG/ML
0.2 INJECTION, SOLUTION INTRAMUSCULAR; INTRAVENOUS; SUBCUTANEOUS
Status: DISCONTINUED | OUTPATIENT
Start: 2023-12-17 | End: 2023-12-18

## 2023-12-17 RX ORDER — HYDROXYZINE HYDROCHLORIDE 50 MG/1
50 TABLET, FILM COATED ORAL
Status: CANCELLED | OUTPATIENT
Start: 2023-12-17

## 2023-12-17 RX ORDER — OXYTOCIN/0.9 % SODIUM CHLORIDE 30/500 ML
100-340 PLASTIC BAG, INJECTION (ML) INTRAVENOUS CONTINUOUS PRN
Status: CANCELLED | OUTPATIENT
Start: 2023-12-17

## 2023-12-17 RX ORDER — LIDOCAINE 40 MG/G
CREAM TOPICAL
Status: DISCONTINUED | OUTPATIENT
Start: 2023-12-17 | End: 2023-12-18

## 2023-12-17 RX ORDER — ACETAMINOPHEN 325 MG/1
650 TABLET ORAL EVERY 4 HOURS PRN
Status: CANCELLED | OUTPATIENT
Start: 2023-12-17

## 2023-12-17 RX ORDER — HYDROXYZINE HYDROCHLORIDE 50 MG/1
50 TABLET, FILM COATED ORAL
Status: DISCONTINUED | OUTPATIENT
Start: 2023-12-17 | End: 2023-12-18

## 2023-12-17 RX ORDER — IBUPROFEN 800 MG/1
800 TABLET, FILM COATED ORAL
Status: DISCONTINUED | OUTPATIENT
Start: 2023-12-17 | End: 2023-12-18

## 2023-12-17 RX ORDER — MORPHINE SULFATE 10 MG/ML
10 INJECTION, SOLUTION INTRAMUSCULAR; INTRAVENOUS
Status: CANCELLED | OUTPATIENT
Start: 2023-12-17

## 2023-12-17 RX ORDER — FENTANYL CITRATE 50 UG/ML
100 INJECTION, SOLUTION INTRAMUSCULAR; INTRAVENOUS
Status: CANCELLED | OUTPATIENT
Start: 2023-12-17

## 2023-12-17 RX ORDER — MORPHINE SULFATE 10 MG/ML
10 INJECTION, SOLUTION INTRAMUSCULAR; INTRAVENOUS
Status: DISCONTINUED | OUTPATIENT
Start: 2023-12-17 | End: 2023-12-18

## 2023-12-17 RX ORDER — ACETAMINOPHEN 325 MG/1
650 TABLET ORAL EVERY 4 HOURS PRN
Status: DISCONTINUED | OUTPATIENT
Start: 2023-12-17 | End: 2023-12-18

## 2023-12-17 RX ORDER — MISOPROSTOL 200 UG/1
400 TABLET ORAL
Status: CANCELLED | OUTPATIENT
Start: 2023-12-17

## 2023-12-17 RX ORDER — LIDOCAINE 40 MG/G
CREAM TOPICAL
Status: CANCELLED | OUTPATIENT
Start: 2023-12-17

## 2023-12-17 RX ORDER — PROCHLORPERAZINE 25 MG
25 SUPPOSITORY, RECTAL RECTAL EVERY 12 HOURS PRN
Status: CANCELLED | OUTPATIENT
Start: 2023-12-17

## 2023-12-17 RX ORDER — METHYLERGONOVINE MALEATE 0.2 MG/ML
200 INJECTION INTRAVENOUS
Status: DISCONTINUED | OUTPATIENT
Start: 2023-12-17 | End: 2023-12-18

## 2023-12-17 RX ORDER — METOCLOPRAMIDE 5 MG/1
10 TABLET ORAL EVERY 6 HOURS PRN
Status: CANCELLED | OUTPATIENT
Start: 2023-12-17

## 2023-12-17 RX ORDER — ONDANSETRON 4 MG/1
4 TABLET, ORALLY DISINTEGRATING ORAL EVERY 6 HOURS PRN
Status: DISCONTINUED | OUTPATIENT
Start: 2023-12-17 | End: 2023-12-18

## 2023-12-17 RX ORDER — ONDANSETRON 2 MG/ML
4 INJECTION INTRAMUSCULAR; INTRAVENOUS EVERY 6 HOURS PRN
Status: DISCONTINUED | OUTPATIENT
Start: 2023-12-17 | End: 2023-12-18

## 2023-12-17 RX ORDER — IBUPROFEN 200 MG
800 TABLET ORAL
Status: CANCELLED | OUTPATIENT
Start: 2023-12-17 | End: 2023-12-22

## 2023-12-17 RX ORDER — OXYTOCIN/0.9 % SODIUM CHLORIDE 30/500 ML
340 PLASTIC BAG, INJECTION (ML) INTRAVENOUS CONTINUOUS PRN
Status: DISCONTINUED | OUTPATIENT
Start: 2023-12-17 | End: 2023-12-18

## 2023-12-17 RX ADMIN — LIDOCAINE HYDROCHLORIDE 0.1 ML: 10 INJECTION, SOLUTION EPIDURAL; INFILTRATION; INTRACAUDAL; PERINEURAL at 20:15

## 2023-12-17 RX ADMIN — MISOPROSTOL 25 MCG: 100 TABLET ORAL at 20:15

## 2023-12-17 RX ADMIN — HYDROXYZINE HYDROCHLORIDE 50 MG: 50 TABLET, FILM COATED ORAL at 22:56

## 2023-12-17 ASSESSMENT — ACTIVITIES OF DAILY LIVING (ADL)
ADLS_ACUITY_SCORE: 20
ADLS_ACUITY_SCORE: 20

## 2023-12-17 NOTE — PROGRESS NOTES
Doing well overall.  No bleeding, no regular ctx.   Had BPP and growth sono today.   Scored 8/8.   EFW 92%, 4314 g, 9 lb 8 oz.   Discussed option of induction for maternal obesity. She also may meet criteria for fetal macrosomia, by time of delivery baby may be 4500 g. She is P3, but this is estimated to be her biggest baby.   We discussed option of elective CS, she is not interested.   Discussed option of induction, methods of induction, risks of failure and multi-day induction, as well as possible need for CS anyway. She desires induction, will have her come in for cervical ripening Stephen evening and plan pitocin Monday as needed.   Completed her FMLA forms today, as she started maternity leave on Monday.   Discussed when to present for signs and symptoms of labor if it happens sooner.   Discussed warning signs for preeclampsia.  Will follow up Sunday night or sooner with any concern for decreased fetal movement, vaginal bleeding, fluid leak, headache, vision change, severe nausea or vomiting, abdominal pain, increased edema or other concerns.   Monica Diehl MD

## 2023-12-18 ENCOUNTER — ANESTHESIA EVENT (OUTPATIENT)
Dept: OBGYN | Facility: CLINIC | Age: 33
End: 2023-12-18
Payer: COMMERCIAL

## 2023-12-18 ENCOUNTER — ANESTHESIA (OUTPATIENT)
Dept: OBGYN | Facility: CLINIC | Age: 33
End: 2023-12-18
Payer: COMMERCIAL

## 2023-12-18 LAB — T PALLIDUM AB SER QL: NONREACTIVE

## 2023-12-18 PROCEDURE — 250N000009 HC RX 250: Performed by: NURSE ANESTHETIST, CERTIFIED REGISTERED

## 2023-12-18 PROCEDURE — 250N000011 HC RX IP 250 OP 636: Performed by: NURSE ANESTHETIST, CERTIFIED REGISTERED

## 2023-12-18 PROCEDURE — 250N000013 HC RX MED GY IP 250 OP 250 PS 637: Performed by: FAMILY MEDICINE

## 2023-12-18 PROCEDURE — 120N000001 HC R&B MED SURG/OB

## 2023-12-18 PROCEDURE — 250N000009 HC RX 250: Performed by: FAMILY MEDICINE

## 2023-12-18 PROCEDURE — 370N000003 HC ANESTHESIA WARD SERVICE: Performed by: NURSE ANESTHETIST, CERTIFIED REGISTERED

## 2023-12-18 PROCEDURE — 10907ZC DRAINAGE OF AMNIOTIC FLUID, THERAPEUTIC FROM PRODUCTS OF CONCEPTION, VIA NATURAL OR ARTIFICIAL OPENING: ICD-10-PCS | Performed by: FAMILY MEDICINE

## 2023-12-18 PROCEDURE — 250N000011 HC RX IP 250 OP 636: Mod: JZ | Performed by: FAMILY MEDICINE

## 2023-12-18 PROCEDURE — 722N000001 HC LABOR CARE VAGINAL DELIVERY SINGLE

## 2023-12-18 PROCEDURE — 3E033VJ INTRODUCTION OF OTHER HORMONE INTO PERIPHERAL VEIN, PERCUTANEOUS APPROACH: ICD-10-PCS | Performed by: FAMILY MEDICINE

## 2023-12-18 PROCEDURE — 250N000011 HC RX IP 250 OP 636: Mod: JZ | Performed by: NURSE ANESTHETIST, CERTIFIED REGISTERED

## 2023-12-18 PROCEDURE — 00HU33Z INSERTION OF INFUSION DEVICE INTO SPINAL CANAL, PERCUTANEOUS APPROACH: ICD-10-PCS | Performed by: FAMILY MEDICINE

## 2023-12-18 PROCEDURE — 0UC97ZZ EXTIRPATION OF MATTER FROM UTERUS, VIA NATURAL OR ARTIFICIAL OPENING: ICD-10-PCS | Performed by: OBSTETRICS & GYNECOLOGY

## 2023-12-18 PROCEDURE — 59400 OBSTETRICAL CARE: CPT | Performed by: FAMILY MEDICINE

## 2023-12-18 PROCEDURE — 3E0R3BZ INTRODUCTION OF ANESTHETIC AGENT INTO SPINAL CANAL, PERCUTANEOUS APPROACH: ICD-10-PCS | Performed by: FAMILY MEDICINE

## 2023-12-18 RX ORDER — BUPIVACAINE HYDROCHLORIDE 2.5 MG/ML
INJECTION, SOLUTION EPIDURAL; INFILTRATION; INTRACAUDAL PRN
Status: DISCONTINUED | OUTPATIENT
Start: 2023-12-18 | End: 2023-12-18

## 2023-12-18 RX ORDER — BISACODYL 10 MG
10 SUPPOSITORY, RECTAL RECTAL DAILY PRN
Status: DISCONTINUED | OUTPATIENT
Start: 2023-12-18 | End: 2023-12-19 | Stop reason: HOSPADM

## 2023-12-18 RX ORDER — OXYTOCIN 10 [USP'U]/ML
10 INJECTION, SOLUTION INTRAMUSCULAR; INTRAVENOUS
Status: DISCONTINUED | OUTPATIENT
Start: 2023-12-18 | End: 2023-12-19 | Stop reason: HOSPADM

## 2023-12-18 RX ORDER — LIDOCAINE HYDROCHLORIDE AND EPINEPHRINE 15; 5 MG/ML; UG/ML
INJECTION, SOLUTION EPIDURAL PRN
Status: DISCONTINUED | OUTPATIENT
Start: 2023-12-18 | End: 2023-12-18

## 2023-12-18 RX ORDER — FENTANYL CITRATE-0.9 % NACL/PF 10 MCG/ML
100 PLASTIC BAG, INJECTION (ML) INTRAVENOUS EVERY 5 MIN PRN
Status: DISCONTINUED | OUTPATIENT
Start: 2023-12-18 | End: 2023-12-18

## 2023-12-18 RX ORDER — CALCIUM CARBONATE 500 MG/1
1000 TABLET, CHEWABLE ORAL DAILY PRN
Status: DISCONTINUED | OUTPATIENT
Start: 2023-12-18 | End: 2023-12-18

## 2023-12-18 RX ORDER — MODIFIED LANOLIN
OINTMENT (GRAM) TOPICAL
Status: DISCONTINUED | OUTPATIENT
Start: 2023-12-18 | End: 2023-12-19 | Stop reason: HOSPADM

## 2023-12-18 RX ORDER — CARBOPROST TROMETHAMINE 250 UG/ML
250 INJECTION, SOLUTION INTRAMUSCULAR
Status: DISCONTINUED | OUTPATIENT
Start: 2023-12-18 | End: 2023-12-19 | Stop reason: HOSPADM

## 2023-12-18 RX ORDER — MISOPROSTOL 200 UG/1
400 TABLET ORAL
Status: DISCONTINUED | OUTPATIENT
Start: 2023-12-18 | End: 2023-12-19 | Stop reason: HOSPADM

## 2023-12-18 RX ORDER — DOCUSATE SODIUM 100 MG/1
100 CAPSULE, LIQUID FILLED ORAL DAILY
Status: DISCONTINUED | OUTPATIENT
Start: 2023-12-18 | End: 2023-12-19 | Stop reason: HOSPADM

## 2023-12-18 RX ORDER — METHYLERGONOVINE MALEATE 0.2 MG/ML
200 INJECTION INTRAVENOUS
Status: DISCONTINUED | OUTPATIENT
Start: 2023-12-18 | End: 2023-12-19 | Stop reason: HOSPADM

## 2023-12-18 RX ORDER — IBUPROFEN 800 MG/1
800 TABLET, FILM COATED ORAL EVERY 6 HOURS PRN
Status: DISCONTINUED | OUTPATIENT
Start: 2023-12-18 | End: 2023-12-19 | Stop reason: HOSPADM

## 2023-12-18 RX ORDER — PANTOPRAZOLE SODIUM 40 MG/1
40 TABLET, DELAYED RELEASE ORAL
Status: COMPLETED | OUTPATIENT
Start: 2023-12-18 | End: 2023-12-18

## 2023-12-18 RX ORDER — HYDROCORTISONE 25 MG/G
CREAM TOPICAL 3 TIMES DAILY PRN
Status: DISCONTINUED | OUTPATIENT
Start: 2023-12-18 | End: 2023-12-19 | Stop reason: HOSPADM

## 2023-12-18 RX ORDER — OXYTOCIN/0.9 % SODIUM CHLORIDE 30/500 ML
340 PLASTIC BAG, INJECTION (ML) INTRAVENOUS CONTINUOUS PRN
Status: DISCONTINUED | OUTPATIENT
Start: 2023-12-18 | End: 2023-12-19 | Stop reason: HOSPADM

## 2023-12-18 RX ORDER — ACETAMINOPHEN 325 MG/1
650 TABLET ORAL EVERY 4 HOURS PRN
Status: DISCONTINUED | OUTPATIENT
Start: 2023-12-18 | End: 2023-12-19 | Stop reason: HOSPADM

## 2023-12-18 RX ORDER — TRANEXAMIC ACID 10 MG/ML
1 INJECTION, SOLUTION INTRAVENOUS EVERY 30 MIN PRN
Status: DISCONTINUED | OUTPATIENT
Start: 2023-12-18 | End: 2023-12-19 | Stop reason: HOSPADM

## 2023-12-18 RX ORDER — NALBUPHINE HYDROCHLORIDE 10 MG/ML
2.5-5 INJECTION, SOLUTION INTRAMUSCULAR; INTRAVENOUS; SUBCUTANEOUS EVERY 6 HOURS PRN
Status: DISCONTINUED | OUTPATIENT
Start: 2023-12-18 | End: 2023-12-18

## 2023-12-18 RX ADMIN — DOCUSATE SODIUM 100 MG: 100 CAPSULE, LIQUID FILLED ORAL at 15:50

## 2023-12-18 RX ADMIN — METHYLERGONOVINE MALEATE 200 MCG: 0.2 INJECTION, SOLUTION INTRAMUSCULAR; INTRAVENOUS at 14:10

## 2023-12-18 RX ADMIN — LIDOCAINE HYDROCHLORIDE,EPINEPHRINE BITARTRATE 3 ML: 15; .005 INJECTION, SOLUTION EPIDURAL; INFILTRATION; INTRACAUDAL; PERINEURAL at 04:43

## 2023-12-18 RX ADMIN — IBUPROFEN 800 MG: 800 TABLET, FILM COATED ORAL at 15:50

## 2023-12-18 RX ADMIN — ACETAMINOPHEN 650 MG: 325 TABLET, FILM COATED ORAL at 20:13

## 2023-12-18 RX ADMIN — PANTOPRAZOLE SODIUM 40 MG: 40 TABLET, DELAYED RELEASE ORAL at 10:40

## 2023-12-18 RX ADMIN — Medication 2 MILLI-UNITS/MIN: at 06:23

## 2023-12-18 RX ADMIN — IBUPROFEN 800 MG: 800 TABLET, FILM COATED ORAL at 22:00

## 2023-12-18 RX ADMIN — Medication: at 13:01

## 2023-12-18 RX ADMIN — LIDOCAINE HYDROCHLORIDE,EPINEPHRINE BITARTRATE 2 ML: 15; .005 INJECTION, SOLUTION EPIDURAL; INFILTRATION; INTRACAUDAL; PERINEURAL at 04:38

## 2023-12-18 RX ADMIN — ACETAMINOPHEN 650 MG: 325 TABLET, FILM COATED ORAL at 10:40

## 2023-12-18 RX ADMIN — Medication: at 04:51

## 2023-12-18 RX ADMIN — BUPIVACAINE HYDROCHLORIDE 7 ML: 2.5 INJECTION, SOLUTION EPIDURAL; INFILTRATION; INTRACAUDAL at 04:43

## 2023-12-18 RX ADMIN — ACETAMINOPHEN 650 MG: 325 TABLET, FILM COATED ORAL at 15:50

## 2023-12-18 RX ADMIN — CALCIUM CARBONATE (ANTACID) CHEW TAB 500 MG 1000 MG: 500 CHEW TAB at 10:40

## 2023-12-18 RX ADMIN — MISOPROSTOL 25 MCG: 100 TABLET ORAL at 00:44

## 2023-12-18 RX ADMIN — BUPIVACAINE HYDROCHLORIDE 3 ML: 2.5 INJECTION, SOLUTION EPIDURAL; INFILTRATION; INTRACAUDAL at 04:48

## 2023-12-18 ASSESSMENT — ACTIVITIES OF DAILY LIVING (ADL)
ADLS_ACUITY_SCORE: 20

## 2023-12-18 NOTE — PROGRESS NOTES
Dr. Enriquez updated on patient status, she requests tylenol for headache and omeprazole for heartburn. Telephone order for tums 1000 mg once daily as needed, and protonix 40 mg once daily (this is formulary per pharmacy). Poppy Méndez RN on 12/18/2023 at 10:32 AM

## 2023-12-18 NOTE — PROGRESS NOTES
S:  Admission  B:  Kassandra is a  @ 38w6d gestation, GBS -, Hep. B -, pregnancy complicated by BMI 39. EFW 9-8 per MD  A:  Patient admitted to room 331 for cytotec induction  R: Pt educated, cytotec placed.

## 2023-12-18 NOTE — H&P
"  2023    Kassandra Mead  [id number]    OB Admit History & Physical      Ms. Mead is here for induction of labor indicated by maternal obesity.     She presented last night for cervical ripening with cytotec and induction of labor with pitocin, currently on 8 mU/min. Epidural was given at 4:30 AM this morning, patient is comfortable. Contractions currently every 3 minutes. Cervix at 4 cm this AM     Patient's last menstrual period was 2023.   Her Estimated Date of Delivery: Dec 25, 2023  , making her 39w0d  wks.      Estimated body mass index is 39.27 kg/m  as calculated from the following:    Height as of 12/15/23: 1.651 m (5' 5\").    Weight as of 12/15/23: 107 kg (236 lb).  Her prenatal course has been complicated by BMI 39.    See prenatal for labs.  negative GBBS, Rubella not Immune , RH (+)    Estimated fetal weight= 4314 gm       She is a 33 year old   Her OB history:   OB History    Para Term  AB Living   5 3 3 0 1 3   SAB IAB Ectopic Multiple Live Births   1 0 0 0 3      # Outcome Date GA Lbr Bi/2nd Weight Sex Delivery Anes PTL Lv   5 Current            4 SAB 20 10w6d    AB, COMPLETE      3 Term 14 38w0d  3.289 kg (7 lb 4 oz) M   N ALYSIA      Name: Aramis   2 Term 13 40w0d  3.856 kg (8 lb 8 oz) F   N ALYSIA      Name: Lisa   1 Term 08/09/10 39w6d  3.345 kg (7 lb 6 oz) F    ALYSIA      Name: Migdalia      Obstetric Comments   EDC 23 based in LMP, SO chandler, no exposure to tobacco, no cats, no safety concerns, 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                 Past Medical History:   Diagnosis Date    Incomplete  with delayed or excessive hemorrhage 2020    Added automatically from request for surgery [id number]    NO ACTIVE PROBLEMS     Pyelectasis of fetus on prenatal ultrasound           Past Surgical History:   Procedure Laterality Date    DILATION AND CURETTAGE SUCTION N/A 2020    Procedure: DILATION AND CURETTAGE, " UTERUS, USING SUCTION US guidance;  Surgeon: Ekta Hernandez DO;  Location: [facility] OR         No current outpatient medications on file.       Allergies: Patient has no known allergies.      REVIEW OF SYSTEMS:  NEUROLOGIC:  Negative  EYES:  Negative  ENT:  Negative  GI:  Negative  BREAST:  Negative  :  Negative  GYN:  Negative  CV:  Negative  PULMONARY:  Negative  MUSCULOSKELETAL:  Negative  PSYCH:  Negative        Social History     Socioeconomic History    Marital status: Single     Spouse name: Not on file    Number of children: Not on file    Years of education: Not on file    Highest education level: Not on file   Occupational History    Not on file   Tobacco Use    Smoking status: Former     Packs/day: 1.00     Years: 13.00     Additional pack years: 0.00     Total pack years: 13.00     Types: Cigarettes     Start date: 2006     Quit date: 2022     Years since quittin.7    Smokeless tobacco: Never   Vaping Use    Vaping Use: Never used   Substance and Sexual Activity    Alcohol use: Not Currently    Drug use: Not Currently    Sexual activity: Yes     Partners: Male   Other Topics Concern    Not on file   Social History Narrative    2019  Lives in [place] with Murray ROWAN, 2 daughters and 1 son.  Kassandra and Murray smoke.  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   No indoor cats/kittens.   No concerns about domestic violence.       Social Determinants of Health     Financial Resource Strain: Low Risk  (2023)    Financial Resource Strain     Within the past 12 months, have you or your family members you live with been unable to get utilities (heat, electricity) when it was really needed?: No   Food Insecurity: Low Risk  (2023)    Food Insecurity     Within the past 12 months, did you worry that your food would run out before you got money to buy more?: No     Within the past 12 months, did the food you bought just not last and you didn t have money to get more?: No  "  Transportation Needs: Low Risk  (11/24/2023)    Transportation Needs     Within the past 12 months, has lack of transportation kept you from medical appointments, getting your medicines, non-medical meetings or appointments, work, or from getting things that you need?: No   Physical Activity: Not on file   Stress: Not on file   Social Connections: Not on file   Interpersonal Safety: Low Risk  (10/13/2023)    Interpersonal Safety     Do you feel physically and emotionally safe where you currently live?: Yes     Within the past 12 months, have you been hit, slapped, kicked or otherwise physically hurt by someone?: No     Within the past 12 months, have you been humiliated or emotionally abused in other ways by your partner or ex-partner?: No   Housing Stability: Low Risk  (11/24/2023)    Housing Stability     Do you have housing? : Yes     Are you worried about losing your housing?: No      Family History   Problem Relation Age of Onset    Alopecia Mother     Cervical Cancer Mother     Alcoholism Mother     Anxiety Disorder Mother     Asthma Father     Bronchitis Father     Diabetes Father         Pre-diabetes    Anxiety Disorder Sister     Blood Disease Maternal Grandmother         polycythemia vera    No Known Problems Paternal Grandmother     Heart Disease Paternal Grandfather         \"valve problem\"    No Known Problems Daughter     Asthma Daughter         \"septic\"    No Known Problems Son        Vitals:    with moderate variability, accels present, no decelerations.  With contractions every 3 min    Alert Awake in NAD  HEENT grossly normal  Neck: no lymphadenopathy or thryoidomegaly  Lungs clear bilaterally  Heart RRR with no murmur  ABD gravid  Pelvic:  moderate fluid noted, no blood noted  Cervix is 4.5 cm / 90 % effaced at -3 station from this AM   EXT:  no edema or calf tenderness  Neuro:  intact    Assessment:  IUP at 39w0d. Received her epidural this morning and is comfortable.     Plan:  Continue " "to monitor, will titrate pitocin as baby tolerates. Anticipate continued progress and vaginal delivery.     \"I have reviewed and verified the documentation as completed by the Medical Student, of the H&P which I personally performed with the Medical Student.     Porfirio Enriquez MD  Dept of OB/GYN  December 18, 2023   "

## 2023-12-18 NOTE — ANESTHESIA PROCEDURE NOTES
Epidural catheter Procedure Note    Pre-Procedure   Staff -        CRNA: Soledad Candelaria APRN CRNA       Performed By: CRNA       Location: OB       Procedure Start/Stop Times: 12/18/2023 4:30 AM and 12/18/2023 7:43 AM       Pre-Anesthestic Checklist: patient identified, IV checked, risks and benefits discussed, informed consent, monitors and equipment checked, pre-op evaluation and at physician/surgeon's request  Timeout:       Correct Patient: Yes        Correct Procedure: Yes        Correct Site: Yes        Correct Position: Yes   Procedure Documentation  Procedure: epidural catheter       Diagnosis: Labor Epidural       Patient Position: sitting       Patient Prep/Sterile Barriers: sterile gloves, mask, patient draped       Skin prep: Betadine       Local skin infiltrated with 3 mL of 1% lidocaine.        Insertion Site: L3-4. (midline approach).       Technique: LORT air        IVETTE at 7 cm.       Needle Type: Emanuel needle and Echeverria       Needle Gauge: 18.        Needle Length (Inches): 3.5        Catheter: 20 G.          Catheter threaded easily.         4 cm epidural space.         Threaded 11 cm at skin.         # of attempts: 1 and  # of redirects:  1    Assessment/Narrative         Paresthesias: No.       Test dose of 3 mL lidocaine 1.5% w/ 1:200,000 epinephrine at.         Test dose negative, 3 minutes after injection, for signs of intravascular, subdural, or intrathecal injection.       Insertion/Infusion Method: LORT air       Aspiration negative for Heme or CSF via Epidural Catheter.       Sensory Level Left: T6.       Sensory Level Right: T6.    Medication(s) Administered   Medication Administration Time: 12/18/2023 4:30 AM     Comments:  Patient sitting at bedside. Risks, benefits and alternatives of epidural analgesia discussed with patient  Her questions were answered, and she consents/wishes to proceed as planned.  Back exam landmarks identified, skin prep with betadine.  Lidocaine  "infiltration at L3/4. ES identified via IVETTE (Air) after 1 attempt (s) and 1 redirect (s). With first attempt patient felt paresthesia on the right around 4cm depth. It persisted with continued forward movement of epidural needle, so withdrew needle and redirected left. No further paresthesias and able to access epidural space easily.  EC passed 4 centimeters without esistance noted.  Negative heme / CSF aspirated.  EC securred with tegaderm/medipore tape. Patient appeared to tolerate procedure well. No apparent complications.        FOR [facility] ([facility]) ONLY:   Pain Team Contact information: please page the Pain Team [address]. Search \"Pain\". During daytime hours, please page the attending first. At night please page the resident first.      "

## 2023-12-18 NOTE — PROGRESS NOTES
In the last two hours FHT's have been 135-150's, moderate variability, accels present, intermittent variable decels which resolve with positional changes. Currenlty peter every 2-4 minutes lasting  seconds while on 8 milliunit/min of pitocin. Straight catheter emptied 200 ml of urine. Chux underneath patient saturated with potential urine or amniotic fluid (unable to determine). Pt currently in throne position. Poppy Méndez RN on 12/18/2023 at 11:10 AM

## 2023-12-18 NOTE — PROGRESS NOTES
FHT's since 0700 have been 130-135, moderate variability, accels present, no decels. Contraction pattern not yet regular with contractions every 2-6 minutes lasting  seconds. Cervical change performed with not change since 0530 am. 4.5cm/80%/-4 station. Side lying release perfomed and patient is currently positioned on left side with peanut ball and knees opened, ankles together. Poppy Méndez RN on 12/18/2023 at 8:24 AM

## 2023-12-18 NOTE — PROGRESS NOTES
S: Delivery  B: induced Labor,  @ [id number] gestation, GBS negative.  A: Patient delivered Vaginal at 1355 with Dr. Enriquez in attendance. Baby delivered and placed on mother's low abdomen for delayed cord clamping where baby was dried and stimulated. After cord clamped and cut, baby was placed skin to skin on mother's chest within 5 minutes following delivery . Apgars 8/9. Placenta was delivered @ 1403 followed by administration of oxytocin. Bonding initiated with mom and baby. Educated mother on importance of exclusive breast feeding, expected feeding readiness cues and encouraged her to observe for feeding cues. Mother informed that breast feeding assistance would be provided. See flowsheet for VS and PP checks. Labor care plan goals met.  R: Expect routine postpartum care. Anticipate first feeding within the hour.

## 2023-12-18 NOTE — PROGRESS NOTES
Miscellaneous delivery note:    I was asked to evaluate her vaginal area for possible tears after delivery.  She was having a little more aggressive bleeding immediately after delivery.  I noted that there was a  1st degree midline perineal laceration which was mild enough that it does not require sutures.  I also noted that there was some uterine atony and I massaged the uterus and did a internal sweep and removed some clots and massage the uterus some more.  I then ordered Methergine 0.2 mg IM.  This was given.  Then her uterus became more firm and the vaginal bleeding slowed substantially.  Her blood pressure has been normal.  At this point her vaginal bleeding is minimal.  She appears to be recovering well.    DEACON Haynes MD

## 2023-12-18 NOTE — ANESTHESIA PREPROCEDURE EVALUATION
Anesthesia Pre-Procedure Evaluation    Patient: Kassandra Mead   MRN: [id number] : 1990        Procedure :           Past Medical History:   Diagnosis Date    Incomplete  with delayed or excessive hemorrhage 2020    Added automatically from request for surgery [id number]    NO ACTIVE PROBLEMS     Pyelectasis of fetus on prenatal ultrasound       Past Surgical History:   Procedure Laterality Date    DILATION AND CURETTAGE SUCTION N/A 2020    Procedure: DILATION AND CURETTAGE, UTERUS, USING SUCTION US guidance;  Surgeon: Ekta Hernandez DO;  Location:  OR      No Known Allergies   Social History     Tobacco Use    Smoking status: Former     Packs/day: 1.00     Years: 13.00     Additional pack years: 0.00     Total pack years: 13.00     Types: Cigarettes     Start date: 2006     Quit date: 2022     Years since quittin.7    Smokeless tobacco: Never   Substance Use Topics    Alcohol use: Not Currently      Wt Readings from Last 1 Encounters:   12/15/23 107 kg (236 lb)        Anesthesia Evaluation   Pt has had prior anesthetic. Type: MAC and General.    No history of anesthetic complications       ROS/MED HX  ENT/Pulmonary:  - neg pulmonary ROS     Neurologic:  - neg neurologic ROS     Cardiovascular:  - neg cardiovascular ROS     METS/Exercise Tolerance: >4 METS    Hematologic:  - neg hematologic  ROS     Musculoskeletal:  - neg musculoskeletal ROS     GI/Hepatic:     (+) GERD,                   Renal/Genitourinary:  - neg Renal ROS     Endo:     (+)               Obesity,       Psychiatric/Substance Use:  - neg psychiatric ROS     Infectious Disease:  - neg infectious disease ROS     Malignancy:  - neg malignancy ROS     Other:  - neg other ROS          Physical Exam    Airway        Mallampati: II   TM distance: > 3 FB   Neck ROM: full   Mouth opening: > 3 cm    Respiratory Devices and Support         Dental  no notable dental history         Cardiovascular   cardiovascular  "exam normal          Pulmonary   pulmonary exam normal                OUTSIDE LABS:  CBC:   Lab Results   Component Value Date    WBC 8.8 12/17/2023    WBC 6.9 05/08/2023    HGB 11.8 12/17/2023    HGB 12.8 10/13/2023    HCT 35.3 12/17/2023    HCT 44.2 05/08/2023     12/17/2023     05/08/2023     BMP:   Lab Results   Component Value Date     02/23/2021     01/20/2020    POTASSIUM 3.6 02/23/2021    POTASSIUM 3.6 01/20/2020    CHLORIDE 109 02/23/2021    CHLORIDE 105 01/20/2020    CO2 28 02/23/2021    CO2 24 01/20/2020    BUN 8 02/23/2021    BUN 7 01/20/2020    CR 0.67 02/23/2021    CR 0.71 01/20/2020    GLC 84 02/23/2021    GLC 94 01/20/2020     COAGS: No results found for: \"PTT\", \"INR\", \"FIBR\"  POC:   Lab Results   Component Value Date    HCG Positive (A) 12/05/2019     HEPATIC:   Lab Results   Component Value Date    ALBUMIN 3.2 (L) 02/23/2021    PROTTOTAL 6.8 02/23/2021    ALT 24 02/23/2021    AST 11 02/23/2021    ALKPHOS 59 02/23/2021    BILITOTAL 0.2 02/23/2021     OTHER:   Lab Results   Component Value Date    NITIN 8.5 02/23/2021    TSH 1.31 02/23/2021       Anesthesia Plan    ASA Status:  2       Anesthesia Type: Epidural.              Consents    Anesthesia Plan(s) and associated risks, benefits, and realistic alternatives discussed. Questions answered and patient/representative(s) expressed understanding.     - Discussed:     - Discussed with:  Patient            Postoperative Care            Comments:    Other Comments: The risks and benefits of anesthesia, and the alternatives where applicable, have been discussed with the patient, and they wish to proceed.           Soledad Candelaria APRN CRNA    I have reviewed the pertinent notes and labs in the chart from the past 30 days and (re)examined the patient.  Any updates or changes from those notes are reflected in this note.                  "

## 2023-12-18 NOTE — PROGRESS NOTES
End of shift note.  Pt denies contractions. Doing well.  Report to the next shift.  Anticipate next cytotec at 0030

## 2023-12-18 NOTE — PROGRESS NOTES
Pt is peter every 2-3  minutes.  Contractions are  moderate  in intensity.  Pain is well controlled with a epidural  .  FHT's are reassuring with no significant decelerations.  The cervix is 5 cm, Effacement is 80-89%, STA is -2.  AROM with clear fluid   A: IUP in Labor with pitocin medical induction   P:  Anticipate vaginal delivery        Routine maternal and fetal monitoring        Porfirio Enriquez MD

## 2023-12-19 VITALS
OXYGEN SATURATION: 97 % | SYSTOLIC BLOOD PRESSURE: 112 MMHG | RESPIRATION RATE: 18 BRPM | HEART RATE: 67 BPM | TEMPERATURE: 97.6 F | DIASTOLIC BLOOD PRESSURE: 68 MMHG

## 2023-12-19 LAB — HGB BLD-MCNC: 11.3 G/DL (ref 11.7–15.7)

## 2023-12-19 PROCEDURE — 90471 IMMUNIZATION ADMIN: CPT | Performed by: FAMILY MEDICINE

## 2023-12-19 PROCEDURE — 250N000011 HC RX IP 250 OP 636: Performed by: FAMILY MEDICINE

## 2023-12-19 PROCEDURE — 85018 HEMOGLOBIN: CPT | Performed by: FAMILY MEDICINE

## 2023-12-19 PROCEDURE — 90707 MMR VACCINE SC: CPT | Performed by: FAMILY MEDICINE

## 2023-12-19 PROCEDURE — 250N000013 HC RX MED GY IP 250 OP 250 PS 637: Performed by: FAMILY MEDICINE

## 2023-12-19 PROCEDURE — 36415 COLL VENOUS BLD VENIPUNCTURE: CPT | Performed by: FAMILY MEDICINE

## 2023-12-19 RX ORDER — IBUPROFEN 800 MG/1
800 TABLET, FILM COATED ORAL EVERY 6 HOURS PRN
Qty: 90 TABLET | Refills: 1 | Status: SHIPPED | OUTPATIENT
Start: 2023-12-19

## 2023-12-19 RX ORDER — ACETAMINOPHEN 325 MG/1
650 TABLET ORAL EVERY 4 HOURS PRN
Start: 2023-12-19

## 2023-12-19 RX ADMIN — IBUPROFEN 800 MG: 800 TABLET, FILM COATED ORAL at 10:23

## 2023-12-19 RX ADMIN — MEASLES, MUMPS, AND RUBELLA VIRUS VACCINE LIVE 0.5 ML: 1000; 12500; 1000 INJECTION, POWDER, LYOPHILIZED, FOR SUSPENSION SUBCUTANEOUS at 10:23

## 2023-12-19 RX ADMIN — DOCUSATE SODIUM 100 MG: 100 CAPSULE, LIQUID FILLED ORAL at 09:10

## 2023-12-19 RX ADMIN — IBUPROFEN 800 MG: 800 TABLET, FILM COATED ORAL at 04:24

## 2023-12-19 ASSESSMENT — ACTIVITIES OF DAILY LIVING (ADL)
ADLS_ACUITY_SCORE: 20

## 2023-12-19 NOTE — PLAN OF CARE
Vitals WNL. Fundus firm without massage, midline @U. Bleeding light, noclots. Ambulates independently by end of shift. Pain in abdomen rated 1/10 at worst and 0/10 at best, managed with tylenol and iby. Bonding with infant going well. 1 void post delivery 250 mls.  Goal Outcome Evaluation:

## 2023-12-19 NOTE — PLAN OF CARE
S: Shift review   B:Kassandra is a  who delivered vaginally on   A: VSS, patient is independent with mobility, pain well controlled with p.o. pain meds. Handles baby with confidence.  R: Continue with routine PP care

## 2023-12-19 NOTE — PLAN OF CARE
Goal Outcome Evaluation:    Shift note    8 hour postpartum.Vaginal delivery without complications.    Following pathway. VSS. FF @ U with light rubra locia, no clots. Pain well managed with ibuprofen and tylenol. Mothers love given for nipple discomfort. Independent with self and  cares. Some assistance needed with positioning and latching for breastfeeding, using a shield for inverted nipples. Voiding spontaneously x2 since delivery    Continue with normal Vaginal postpartum assessments and pathway. Offer assistance as needed with cares and feedings. Plan for discharge on 23

## 2023-12-19 NOTE — PROGRESS NOTES
S: Transfer to postpartum  B: Vaginal birth @ 1355, small tear, 0 repair, breast feeding    A: Mother and baby transferred to postpartum unit at 1700 via ambulation after completion of immediate recovery period. Patient oriented to room. Mother and baby bonding well and in satisfactory condition upon transfer.  R: Anticipate routine postpartum care.

## 2023-12-19 NOTE — PLAN OF CARE
Goal Outcome Evaluation:      Plan of Care Reviewed With: patient, significant other    Overall Patient Progress: improvingOverall Patient Progress: improving         S: Discharge  to home    B: Patient had a Vaginal delivery with no complications. Baby girl Baby's name Elle, breast: . Support person's name Murray.     A: VSS,Minimal isaias pain, using tucks,and ibuprofen prn. Hgb 11.3 scant vag flow without clots. First degree tear without stitches. PPD score 0. First time BF, nipples inverted using a nipple shield and a nipple everter. Mild nipple tenderness. Infant latches well when infant alert. Will follow up with Stephanie LYON prn. Pt declined education needs.    R: All Discharge instructions reviewed and questions answered.  Belongings gathered and returned to the patient. Agreed to follow up in 6   weeks or sooner with any question or concerns.     Nursing Discharge Checklist:    Pneumovax screened and given, if appropriate: N/A  Influenza vaccine screened and given, if appropriate: N/A  Staples removed (): N/A  Breast milk returned: N/A  Hydrogel pads sent home:N/A  Birth Certificate Done: YES  Public Health Referral Made: N/A   Initial (On Arrival)

## 2023-12-19 NOTE — L&D DELIVERY NOTE
"Patient is a 33 year old  now  at 39w0d EGA who had  of a 8 lb 12.7 oz viable female infant at 1:55 PM.  Patient presented for cervical ripening and pitocin induction for maternal obesity and progressed normally through stage one labor and was complete at 1:43 PM.  Epidural was utilized for pain control.  Head was delivered in BERNADETTE position.  Shoulders and body delivered without difficulty.  Infant had a spontaneous cry and APGAR scores of 8 at one minute and 9 at five minutes.  Placenta spontaneously delivered intact, with 3 vessel cord at 2:03 PM.  1st degree midline perineal laceration was mild enough it did not require sutures.  mL.  Mother and infant doing well.        \"I was in attendance with the student who participated in the delivery with me and who completed the documentation of the note. I have verified and agree with the above delivery summary as documented in the note.\"     Student worked with IT for two days and they can't find a way for her to sign the note ,so with her in my presence, I'm signing the note for her.  3/25/24  Porfirio Enriquez MD for Lissy Avendaño, MS3        Porfirio Enriquez MD  "

## 2023-12-19 NOTE — DISCHARGE SUMMARY
[facility] Discharge Summary    Kassandra Mead MRN# [id number]   Age: 33 year old YOB: 1990     Date of Admission:  12/17/2023  Date of Discharge::  12/19/2023  Admitting Physician:  Porfirio Enriquez MD  Discharge Physician:  Shai Sawyer MD     [place] clinic: [facility] Diagnoses:   Macrosomia of fetus affecting management of mother in third trimester, single or unspecified fetus [O36.63X0]  Term IUP          Discharge Diagnosis:     Augmented vaginal delivery  Intrauterine pregnancy at 39 weeks gestation          Procedures:     Procedure(s): No additional procedures performed       No procedures performed during this admission           Medications Prior to Admission:     Medications Prior to Admission   Medication Sig Dispense Refill Last Dose    albuterol (PROAIR HFA/PROVENTIL HFA/VENTOLIN HFA) 108 (90 Base) MCG/ACT inhaler Inhale 2 puffs into the lungs every 6 hours as needed for shortness of breath, wheezing or cough 18 g 0 Unknown    Prenatal Vit-Fe Fumarate-FA (PRENATAL MULTIVITAMIN W/IRON) 27-0.8 MG tablet Take 1 tablet by mouth daily   12/17/2023 at 0900    [DISCONTINUED] omeprazole (PRILOSEC) 20 MG DR capsule Take 1 capsule (20 mg) by mouth daily 30 capsule 1 12/17/2023 at 0900    [DISCONTINUED] ondansetron (ZOFRAN ODT) 4 MG ODT tab Take 1 tablet (4 mg) by mouth every 8 hours as needed for nausea 30 tablet 1 More than a month             Discharge Medications:     Current Discharge Medication List        CONTINUE these medications which have NOT CHANGED    Details   albuterol (PROAIR HFA/PROVENTIL HFA/VENTOLIN HFA) 108 (90 Base) MCG/ACT inhaler Inhale 2 puffs into the lungs every 6 hours as needed for shortness of breath, wheezing or cough  Qty: 18 g, Refills: 0    Comments: Pharmacy may dispense brand covered by insurance (Proair, or proventil or ventolin or generic albuterol inhaler)  Associated Diagnoses: Supervision of  high risk pregnancy in third trimester      omeprazole (PRILOSEC) 20 MG DR capsule Take 1 capsule (20 mg) by mouth daily  Qty: 30 capsule, Refills: 1    Associated Diagnoses: Gastroesophageal reflux disease, unspecified whether esophagitis present      ondansetron (ZOFRAN ODT) 4 MG ODT tab Take 1 tablet (4 mg) by mouth every 8 hours as needed for nausea  Qty: 30 tablet, Refills: 1    Associated Diagnoses: Nausea      Prenatal Vit-Fe Fumarate-FA (PRENATAL MULTIVITAMIN W/IRON) 27-0.8 MG tablet Take 1 tablet by mouth daily                   Consultations:   No consultations were requested during this admission          Brief History of Labor:   Kassandra presented on the night of 12/17/23 for cervical ripening with cytotec. The next morning pitocin was started for induction and she received an epidural for pain management. AROM was performed at 12:30 PM followed by progression of labor, she was complete by 1:43 PM. Baby girl was delivered at 1:55 PM  without complication and the placenta was delivered at 2:03 PM. She had a 1st degree midline perineal laceration that was mild enough that it did not require sutures.            Hospital Course:   The patient's hospital course was unremarkable.  On discharge, her pain was well controlled. Vaginal bleeding is similar to peak menstrual flow.  Voiding without difficulty.  Ambulating well and tolerating a normal diet.  No fever.  Breastfeeding well.  Infant is stable.  No bowel movement yet, will try stool softener today before discharge.  She was discharged on post-partum day #1.    Post-partum hemoglobin:   Hemoglobin   Date Value Ref Range Status   12/19/2023 11.3 (L) 11.7 - 15.7 g/dL Final   02/23/2021 16.3 (H) 11.7 - 15.7 g/dL Final             Discharge Instructions and Follow-Up:     Discharge diet: Regular   Discharge activity: Activity as tolerated   Discharge follow-up: Follow up with primary care provider in 3 days   Wound care: Drink plenty of fluids  Ice to area for  comfort           Discharge Disposition:     Discharged to home      Attestation:  Lissy Avendaño, MS3    I was present with the medical student who participated in the service and in the documentation of the note. I have verified the history and personally performed the physical exam and medical decision making. I reviewed the note in detail and edited it appropriately. I agree with the assessment and plan of care as documented in the note.     Electronically signed by:  Shai Sawyer M.D.  12/19/2023

## 2023-12-19 NOTE — DISCHARGE INSTRUCTIONS
[facility] Discharge Instructions     Discharge disposition:  Discharged to home       Diet:  Regular       Activity No lifting more than 20# x 2 wks then increase gradually by 10# per week.  No driving or operating machinery while on narcotic analgesics  Pelvic rest: abstain from intercourse and do not use tampons for 6 week(s)        Follow-up: Follow up with primary care provider in 6-8 weeks       Additional instructions: Leanne-care per nursing staff      Postpartum Vaginal Delivery Instructions    Activity     Ask family and friends for help when you need it.  Do not place anything in your vagina for 6 weeks.  You are not restricted on other activities, but take it easy for a few weeks to allow your body to recover from delivery.  You are able to do any activities you feel up to that point.  No driving until you have stopped taking your pain medications (usually two weeks after delivery).     Call your health care provider if you have any of these symptoms:     Increased pain, swelling, redness, or fluid around your stiches from an episiotomy or perineal tear.  A fever above 100.4 F (38 C) with or without chills when placing a thermometer under your tongue.  You soak a sanitary pad with blood within 1 hour, or you see blood clots larger than a golf ball.  Bleeding that lasts more than 6 weeks.  Vaginal discharge that smells bad.  Severe pain, cramping or tenderness in your lower belly area.  A need to urinate more frequently (use the toilet more often), more urgently (use the toilet very quickly), or it burns when you urinate.  Nausea and vomiting.  Redness, swelling or pain around a vein in your leg.  Problems breastfeeding or a red or painful area on your breast.  Chest pain and cough or are gasping for air.  Problems coping with sadness, anxiety, or depression.  If you have any concerns about hurting yourself or the baby, call your provider immediately.   You have questions or  concerns after you return home.     Keep your hands clean:  Always wash your hands before touching your perineal area and stitches.  This helps reduce your risk of infection.  If your hands aren't dirty, you may use an alcohol hand-rub to clean your hands. Keep your nails clean and short.

## 2023-12-20 NOTE — ANESTHESIA POSTPROCEDURE EVALUATION
Patient: Kassandra Mead    Procedure: * No procedures listed *       Anesthesia Type:  Epidural    Note:  Disposition: Inpatient   Postop Pain Control: Uneventful            Sign Out: Well controlled pain   PONV: No   Neuro/Psych: Uneventful            Sign Out: Acceptable/Baseline neuro status   Airway/Respiratory: Uneventful            Sign Out: Acceptable/Baseline resp. status   CV/Hemodynamics: Uneventful            Sign Out: Acceptable CV status   Other NRE: NONE   DID A NON-ROUTINE EVENT OCCUR? No    Event details/Postop Comments:  Pt was happy with her anesthesia care.  No complications.  I advised the pt she may have some soreness at the epidural site and this is normal.  However if the soreness continues over a week or if redness is noted around the site to let anesthesia know.  I will follow up with the pt if needed.           Last vitals:  There were no vitals filed for this visit.    Electronically Signed By: ALLIE Hardin CRNA  December 19, 2023  6:25 PM

## 2023-12-23 NOTE — PROGRESS NOTES
Kassandra Mead  Gender: female  : 1990  [address] [facility] [address]  [phone] (home)   Medical Record: [id number]  Primary Care Provider: Yanci Patterson [facility]   ?   Discharge Phone Call: Key Words/Key Times     How are you and the baby?     How are feedings going?     Voiding & Stooling?     Any questions or concerns?     Follow-up appointment?       We want to provide excellent care here at [facility]. Do you have any feedback for us that would help us improve?     Call back COMMENTS:   Attempted to call patient for PP followup phone call, but unsuccessful. Message left to return call if having any questions or concerns.      Attempted Calls:   _X1  ________     __________

## 2023-12-24 NOTE — PROGRESS NOTES
Kassandra Mead  Gender: female  : 1990  [address] [facility] [address]  [phone] (home)   Medical Record: [id number]  Primary Care Provider: Yanci Patterson [facility]   ?   Discharge Phone Call: Key Words/Key Times     How are you and the baby?     How are feedings going?     Voiding & Stooling?     Any questions or concerns?     Follow-up appointment?     We want to provide excellent care here at [facility]. Do you have any feedback for us that would help us improve?     Call back COMMENTS:   No answer, left voicemail. Stated we would call again on     Attempted Calls: [phone] CAESAR Ramirez RN__________

## 2023-12-25 ENCOUNTER — MYC MEDICAL ADVICE (OUTPATIENT)
Dept: FAMILY MEDICINE | Facility: CLINIC | Age: 33
End: 2023-12-25
Payer: COMMERCIAL

## 2023-12-25 DIAGNOSIS — N61.0 ACUTE MASTITIS OF LEFT BREAST: Primary | ICD-10-CM

## 2023-12-26 RX ORDER — DICLOXACILLIN SODIUM 500 MG
500 CAPSULE ORAL 4 TIMES DAILY
Qty: 40 CAPSULE | Refills: 0 | Status: SHIPPED | OUTPATIENT
Start: 2023-12-26 | End: 2024-01-05

## 2023-12-26 NOTE — TELEPHONE ENCOUNTER
Her breast still has redness, she has no pain.    No fevers.    The area is warm to touch.    Please advise if she should do an evisit.    oRsibel Judd RN on 12/26/2023 at 2:58 PM

## 2023-12-29 ENCOUNTER — MYC MEDICAL ADVICE (OUTPATIENT)
Dept: FAMILY MEDICINE | Facility: CLINIC | Age: 33
End: 2023-12-29

## 2024-01-02 ENCOUNTER — MYC MEDICAL ADVICE (OUTPATIENT)
Dept: FAMILY MEDICINE | Facility: CLINIC | Age: 34
End: 2024-01-02
Payer: COMMERCIAL

## 2024-02-28 ENCOUNTER — OFFICE VISIT (OUTPATIENT)
Dept: FAMILY MEDICINE | Facility: CLINIC | Age: 34
End: 2024-02-28
Payer: COMMERCIAL

## 2024-02-28 ENCOUNTER — HOSPITAL ENCOUNTER (OUTPATIENT)
Dept: GENERAL RADIOLOGY | Facility: CLINIC | Age: 34
Discharge: HOME | End: 2024-02-28
Attending: PHYSICIAN ASSISTANT | Admitting: PHYSICIAN ASSISTANT
Payer: COMMERCIAL

## 2024-02-28 VITALS
SYSTOLIC BLOOD PRESSURE: 112 MMHG | RESPIRATION RATE: 16 BRPM | OXYGEN SATURATION: 98 % | HEART RATE: 68 BPM | BODY MASS INDEX: 37.82 KG/M2 | WEIGHT: 227 LBS | DIASTOLIC BLOOD PRESSURE: 66 MMHG | TEMPERATURE: 97.7 F | HEIGHT: 65 IN

## 2024-02-28 DIAGNOSIS — M25.552 LEFT HIP PAIN: ICD-10-CM

## 2024-02-28 DIAGNOSIS — E66.01 MORBID OBESITY (H): ICD-10-CM

## 2024-02-28 DIAGNOSIS — L70.9 ACNE, UNSPECIFIED ACNE TYPE: ICD-10-CM

## 2024-02-28 DIAGNOSIS — F41.1 GAD (GENERALIZED ANXIETY DISORDER): ICD-10-CM

## 2024-02-28 DIAGNOSIS — Z12.4 CERVICAL CANCER SCREENING: ICD-10-CM

## 2024-02-28 PROCEDURE — 87624 HPV HI-RISK TYP POOLED RSLT: CPT | Performed by: PHYSICIAN ASSISTANT

## 2024-02-28 PROCEDURE — 73502 X-RAY EXAM HIP UNI 2-3 VIEWS: CPT

## 2024-02-28 PROCEDURE — 99207 PR POST PARTUM EXAM: CPT | Performed by: PHYSICIAN ASSISTANT

## 2024-02-28 PROCEDURE — 99213 OFFICE O/P EST LOW 20 MIN: CPT | Performed by: PHYSICIAN ASSISTANT

## 2024-02-28 PROCEDURE — G0145 SCR C/V CYTO,THINLAYER,RESCR: HCPCS | Performed by: PHYSICIAN ASSISTANT

## 2024-02-28 RX ORDER — BUPROPION HYDROCHLORIDE 150 MG/1
150 TABLET ORAL EVERY MORNING
Qty: 90 TABLET | Refills: 1 | Status: SHIPPED | OUTPATIENT
Start: 2024-02-28

## 2024-02-28 RX ORDER — BUSPIRONE HYDROCHLORIDE 10 MG/1
10 TABLET ORAL 2 TIMES DAILY
Qty: 180 TABLET | Refills: 1 | Status: SHIPPED | OUTPATIENT
Start: 2024-02-28

## 2024-02-28 RX ORDER — CLINDAMYCIN PHOSPHATE 10 MG/G
GEL TOPICAL 2 TIMES DAILY
Qty: 60 G | Refills: 11 | Status: SHIPPED | OUTPATIENT
Start: 2024-02-28

## 2024-02-28 RX ORDER — PHENTERMINE HYDROCHLORIDE 30 MG/1
30 CAPSULE ORAL EVERY MORNING
Qty: 30 CAPSULE | Refills: 2 | Status: SHIPPED | OUTPATIENT
Start: 2024-02-28 | End: 2024-07-15

## 2024-02-28 ASSESSMENT — PAIN SCALES - GENERAL: PAINLEVEL: NO PAIN (0)

## 2024-02-28 NOTE — PROGRESS NOTES
Kassandra is here for a 6-week postpartum checkup.    She had a  of a viable girl, weight 8 pounds 13 oz., with none complications. Date of delivery was 23. Since delivery, she has not been breast feeding.  She has No signs of infection, bleeding or other complications.  She is not pregnant.  We discussed contraceptions and she has chosen condoms.      Post partum tubal: No  History of Gestational Diabetes? No  Type of Delivery:  Vaginal  Feeding Method:  Formula  If initiated breast feeding and stopped, how long did you breast feed?: 1.5 months    Would like to restart Wellbutrin and Buspar at this time. Moods doing well but this was helpful.     Also interested in restarting Phentermine to help with weight loss.     Having more pain in her left hip - fracture in an accident many years ago. Pain with external rotation and sitting.     REVIEW OF SYSTEMS:  Ears/Nose/Throat: negative  Respiratory: negative  Cardiovascular: negative  Gastrointestinal: negative  Genitourinary: negative  Musculoskeletal: negative    Neurologic: negative   Skin: negative   Endocrine:  negative  Vagina: negative  Cervix: negative  Breasts: negative  Vulva: negative  Episiotomy: negative    Contraception Plan: none, condoms, and significant other planning vasectomy    Medical History Reviewed no  Family History Reviewed no  Problem List Updated no    EXAM:  LMP 2023   HEENT: grossly normal.  NECK: no lymphadenopathy or thyroidomegaly.  LUNGS: CTA X 2, no rales or crackles.  BACK: No spinal or CVA tenderness.  HEART: RRR without murmurs clicks or gallops.  ABDOMEN: soft, non tender, good bowel sounds, without masses rebound, guarding or tenderness.  PELVIC:  External genitalia; normal without lesion, repair well healed.   Vagina: normal mucosa and rugae, no discharge.  Cervix: multiparous, well healed, without lesion.  Uterus: non pregnant in size, firm , mobile, no lesions,     Normal shape, position and consistency  Adnexa: non  tender, without masses.    EXTREMITIES:  warm to touch, good pulses, no ankle edema or calf tenderness.  NEUROLOGIC: grossly normal.    ASSESSMENT:   6-week postpartum exam after .    PLAN:    (Z39.2) Routine postpartum follow-up  (primary encounter diagnosis)    (Z12.4) Cervical cancer screening  Plan: Pap Screen with HPV - recommended age 30 - 65         years    (F41.1) KEMAR (generalized anxiety disorder)  Comment: Previously did well with this combination - restarting today.   Plan: buPROPion (WELLBUTRIN XL) 150 MG 24 hr tablet,         busPIRone (BUSPAR) 10 MG tablet          (E66.01) Morbid obesity (H)  Comment: Encouraged ongoing diet and exercise modifications as able.     (M25.552) Left hip pain  Comment: Xray obtained today. Further follow-up pending results.   Plan: XR Hip Left 2-3 Views          (L70.9) Acne, unspecified acne type  Plan: clindamycin (CLEOCIN-T) 1 % external gel      Discussed calcium intake, vitamins and supplements  Exercise encouraged  Weight loss recommended.  One-hour glucose tolerance test needed? No  condoms for contraception.    Answers submitted by the patient for this visit:  General Questionnaire (Submitted on 2024)  Chief Complaint: Chronic problems general questions HPI Form  What is the reason for your visit today? : Postpartum  How many servings of fruits and vegetables do you eat daily?: 0-1  On average, how many sweetened beverages do you drink each day (Examples: soda, juice, sweet tea, etc.  Do NOT count diet or artificially sweetened beverages)?: 4  How many minutes a day do you exercise enough to make your heart beat faster?: 30 to 60  How many days a week do you exercise enough to make your heart beat faster?: 4  How many days per week do you miss taking your medication?: 0

## 2024-03-03 LAB
BKR LAB AP GYN ADEQUACY: NORMAL
BKR LAB AP GYN INTERPRETATION: NORMAL
BKR LAB AP HPV REFLEX: NORMAL
BKR LAB AP PREVIOUS ABNORMAL: NORMAL
PATH REPORT.COMMENTS IMP SPEC: NORMAL
PATH REPORT.COMMENTS IMP SPEC: NORMAL
PATH REPORT.RELEVANT HX SPEC: NORMAL

## 2024-03-04 LAB
HUMAN PAPILLOMA VIRUS 16 DNA: NEGATIVE
HUMAN PAPILLOMA VIRUS 18 DNA: NEGATIVE
HUMAN PAPILLOMA VIRUS FINAL DIAGNOSIS: NORMAL
HUMAN PAPILLOMA VIRUS OTHER HR: NEGATIVE

## 2024-03-31 ENCOUNTER — MYC MEDICAL ADVICE (OUTPATIENT)
Dept: FAMILY MEDICINE | Facility: CLINIC | Age: 34
End: 2024-03-31
Payer: COMMERCIAL

## 2024-03-31 DIAGNOSIS — E66.01 MORBID OBESITY (H): Primary | ICD-10-CM

## 2024-04-10 ENCOUNTER — TELEPHONE (OUTPATIENT)
Dept: FAMILY MEDICINE | Facility: CLINIC | Age: 34
End: 2024-04-10
Payer: COMMERCIAL

## 2024-04-10 NOTE — TELEPHONE ENCOUNTER
Prior Authorization Retail Medication Request    Medication/Dose: Semaglutide-Weight Management (WEGOVY) 0.25 MG/0.5ML pen  Diagnosis and ICD code (if different than what is on RX):      Morbid obesity (H) [E66.01]  - Primary     New/renewal/insurance change PA/secondary ins. PA:  Previously Tried and Failed:  na  Rationale:  na    Insurance   Primary: [facility]     Insurance ID:  [id number]     Secondary (if applicable):     BLUE PLUS [facility]     Insurance ID:  [id number]     Pharmacy Information (if different than what is on RX)  [facility]  Phone:  [phone]  Fax:[phone]

## 2024-04-23 NOTE — TELEPHONE ENCOUNTER
PA Initiation    Medication: WEGOVY 0.25 MG/0.5ML SC SOAJ  Insurance Company: [place] Medicaid ([facility]) - Phone [phone] Fax [phone]  Pharmacy Filling the Rx: [facility] - [place] - [address]  Filling Pharmacy Phone: [phone]  Filling Pharmacy Fax:    Start Date: 4/23/2024

## 2024-04-24 ENCOUNTER — TELEPHONE (OUTPATIENT)
Dept: FAMILY MEDICINE | Facility: CLINIC | Age: 34
End: 2024-04-24
Payer: COMMERCIAL

## 2024-04-24 NOTE — TELEPHONE ENCOUNTER
Called patient.  Transferred patient to business office to update her insurance coverage.    Monica Curry XRO/

## 2024-04-24 NOTE — TELEPHONE ENCOUNTER
PRIOR AUTHORIZATION DENIED    Medication: WEGOVY 0.25 MG/0.5ML SC SOAJ  Insurance Company: [place] Medicaid ([facility]) - Phone [phone] Fax [phone]  Denial Date: 4/24/2024  Denial Reason(s): Primary needs to pay at least 60%      Appeal Information: No  Patient Notified: No

## 2024-04-24 NOTE — TELEPHONE ENCOUNTER
Unsure why this was sent to me - please call as requested below to find out insurance needed.     Yanci Patterson PA-C

## 2024-04-24 NOTE — TELEPHONE ENCOUNTER
Patients Medicaid Denied stating to bill patients Primary insurance- They have 4 listed all of which kick back not active for me. Can we please reach out to the patient and figure out who her primary insurance is, then she will need to reach out to her county worker to let them know if she has one and what it is. PA can not be completed until we have proper insurance information.    Thank You!  Betsy Jaeger CPhT  Prior Auth Specialist

## 2024-04-30 ENCOUNTER — MYC MEDICAL ADVICE (OUTPATIENT)
Dept: FAMILY MEDICINE | Facility: CLINIC | Age: 34
End: 2024-04-30
Payer: COMMERCIAL

## 2024-04-30 DIAGNOSIS — E66.01 MORBID OBESITY (H): ICD-10-CM

## 2024-05-03 NOTE — TELEPHONE ENCOUNTER
Prior Authorization Retail Medication Request    Medication/Dose: Wegovy  Diagnosis and ICD code (if different than what is on RX):  Morbid obesity (H) [E66.01]   New/renewal/insurance change PA/secondary ins. PA:  Previously Tried and Failed:  n/a  Rationale:  Morbid obesity (H) [E66.01]       Yanci Ledesma MA

## 2024-05-09 ENCOUNTER — TELEPHONE (OUTPATIENT)
Dept: FAMILY MEDICINE | Facility: CLINIC | Age: 34
End: 2024-05-09
Payer: COMMERCIAL

## 2024-05-22 ENCOUNTER — TELEPHONE (OUTPATIENT)
Dept: FAMILY MEDICINE | Facility: CLINIC | Age: 34
End: 2024-05-22

## 2024-05-22 NOTE — TELEPHONE ENCOUNTER
Prior Authorization Retail Medication Request    Medication/Dose: Semaglutide-Weight Management (WEGOVY) 0.25 MG/0.5ML pen  0.25 mg, WEEKLY      Diagnosis and ICD code (if different than what is on RX):    Morbid obesity (H) [E66.01]        New/renewal/insurance change PA/secondary ins. PA:  Previously Tried and Failed:    Rationale:      Insurance   Primary: [employer] [place]   Insurance ID:  [id number]     Secondary (if applicable):  Insurance ID:      Pharmacy Information (if different than what is on RX)  Name:    [facility] [address] [place] [address] - [address]     Phone:  [phone]  Fax:[phone]

## 2024-05-24 ENCOUNTER — TELEPHONE (OUTPATIENT)
Dept: FAMILY MEDICINE | Facility: CLINIC | Age: 34
End: 2024-05-24
Payer: COMMERCIAL

## 2024-05-24 NOTE — TELEPHONE ENCOUNTER
Prior Authorization Retail Medication Request    Medication/Dose: phentermine 30 MG capsule 30 capsule 2  Diagnosis and ICD code (if different than what is on RX):    Morbid obesity (H) [E66.01]        New/renewal/insurance change PA/secondary ins. PA:  Previously Tried and Failed:    Rationale:      Insurance   Primary:     [employer] [place]       Insurance ID:  [id number]     Secondary (if applicable):  Insurance ID:      Pharmacy Information (if different than what is on RX)  Name:    [facility] - [place], [place] - [facility] [address]     Phone:  [phone]   Fax:       [phone]

## 2024-06-02 NOTE — TELEPHONE ENCOUNTER
PA Initiation    Medication: PHENTERMINE HCL 30 MG PO CAPS  Insurance Company: [place] Medicaid ([facility]) - Phone [phone] Fax [phone]  Pharmacy Filling the Rx: [facility] - [place], [place] - [address]  Filling Pharmacy Phone:    Filling Pharmacy Fax:    Start Date: 6/2/2024    [id number]

## 2024-06-03 NOTE — TELEPHONE ENCOUNTER
PA Initiation    Medication: WEGOVY 0.25 MG/0.5ML SC SOAJ  Insurance Company: [place] Medicaid ([facility]) - Phone [phone] Fax [phone]  Pharmacy Filling the Rx: [facility] [address] [place] [address] - [address]  Filling Pharmacy Phone:    Filling Pharmacy Fax:    Start Date: 6/3/2024    [id number]

## 2024-06-04 NOTE — TELEPHONE ENCOUNTER
PRIOR AUTHORIZATION DENIED    Medication: WEGOVY 0.25 MG/0.5ML SC SOAJ  Insurance Company: [place] Medicaid ([facility]) - Phone [phone] Fax [phone]  Denial Date: 6/3/2024  Denial Reason(s):     Appeal Information:

## 2024-06-04 NOTE — TELEPHONE ENCOUNTER
PRIOR AUTHORIZATION DENIED    Medication: PHENTERMINE HCL 30 MG PO CAPS  Insurance Company: [place] Medicaid ([facility]) - Phone [phone] Fax [phone]  Denial Date: 6/3/2024  Denial Reason(s):     Appeal Information:

## 2024-06-05 NOTE — TELEPHONE ENCOUNTER
Pharmacy completed notice of non-covered product with insurance allowing patient to pay cash. Unable to process further authorizations.

## 2024-07-12 ENCOUNTER — MYC MEDICAL ADVICE (OUTPATIENT)
Dept: FAMILY MEDICINE | Facility: CLINIC | Age: 34
End: 2024-07-12
Payer: COMMERCIAL

## 2024-07-12 DIAGNOSIS — E66.01 MORBID OBESITY (H): ICD-10-CM

## 2024-07-14 ENCOUNTER — HEALTH MAINTENANCE LETTER (OUTPATIENT)
Age: 34
End: 2024-07-14

## 2024-07-17 NOTE — TELEPHONE ENCOUNTER
PA was done and denied on 05-. Read the denial for info needed,  you would have to write an appeal and send that to the PA Team in order to start the PA Process again.    You can route to P [facility] PA MED    Monica REYNOLDS/

## 2024-08-14 ENCOUNTER — MYC MEDICAL ADVICE (OUTPATIENT)
Dept: FAMILY MEDICINE | Facility: CLINIC | Age: 34
End: 2024-08-14
Payer: COMMERCIAL

## 2024-08-15 NOTE — TELEPHONE ENCOUNTER
I completed a paper appeals form for this patient for Wegovy. Please see if we can find in previously faxed pile and resend. This was faxed around 7/20 timeframe.     Yanci Patterson PA-C

## 2024-09-24 ENCOUNTER — MYC MEDICAL ADVICE (OUTPATIENT)
Dept: FAMILY MEDICINE | Facility: CLINIC | Age: 34
End: 2024-09-24
Payer: COMMERCIAL

## 2024-09-24 DIAGNOSIS — E66.01 MORBID OBESITY (H): Primary | ICD-10-CM

## 2024-09-26 ENCOUNTER — TELEPHONE (OUTPATIENT)
Dept: FAMILY MEDICINE | Facility: CLINIC | Age: 34
End: 2024-09-26
Payer: COMMERCIAL

## 2024-09-26 DIAGNOSIS — E66.01 MORBID OBESITY (H): Primary | ICD-10-CM

## 2024-09-26 NOTE — TELEPHONE ENCOUNTER
Prior Authorization Retail Medication Request    Medication/Dose: tirzepatide-Weight Management (ZEPBOUND) 2.5 MG/0.5ML prefilled pen  Diagnosis and ICD code (if different than what is on RX):      Morbid obesity [E66.01]  - Primary        New/renewal/insurance change PA/secondary ins. PA:  Previously Tried and Failed:  na  Rationale:  na    Insurance   Primary: [employer]  Insurance ID:  [id number]     Secondary (if applicable):na  Insurance ID:  [id number]    Pharmacy Information (if different than what is on RX)  Name:  [facility]  Phone:  [phone]  Fax:[phone]    Clinic Information  Preferred routing pool for dept communication: [facility]

## 2024-10-01 NOTE — TELEPHONE ENCOUNTER
PRIOR AUTHORIZATION DENIED    Medication: ZEPBOUND 2.5 MG/0.5ML SC SOAJ    Insurance Company: [place] Medicaid ([facility]) - Phone [phone] Fax [phone]    Denial Date: 10/1/2024    Denial Reason(s):         Appeal Information:

## 2024-10-01 NOTE — TELEPHONE ENCOUNTER
PA Initiation    Medication: ZEPBOUND 2.5 MG/0.5ML SC SOAJ  Insurance Company: [place] Medicaid ([facility]) - Phone [phone] Fax [phone]  Pharmacy Filling the Rx: [facility] [address] [place] [address] - [address]  Filling Pharmacy Phone: [phone]  Filling Pharmacy Fax: [phone]  Start Date: 10/1/2024

## 2025-02-12 ENCOUNTER — MYC MEDICAL ADVICE (OUTPATIENT)
Dept: FAMILY MEDICINE | Facility: CLINIC | Age: 35
End: 2025-02-12
Payer: COMMERCIAL

## 2025-04-08 ENCOUNTER — MYC MEDICAL ADVICE (OUTPATIENT)
Dept: FAMILY MEDICINE | Facility: CLINIC | Age: 35
End: 2025-04-08

## 2025-04-08 ENCOUNTER — E-VISIT (OUTPATIENT)
Dept: FAMILY MEDICINE | Facility: CLINIC | Age: 35
End: 2025-04-08
Payer: COMMERCIAL

## 2025-04-08 DIAGNOSIS — F41.1 GAD (GENERALIZED ANXIETY DISORDER): ICD-10-CM

## 2025-04-08 DIAGNOSIS — J22 LRTI (LOWER RESPIRATORY TRACT INFECTION): Primary | ICD-10-CM

## 2025-04-08 RX ORDER — AZITHROMYCIN 250 MG/1
TABLET, FILM COATED ORAL
Qty: 6 TABLET | Refills: 0 | Status: SHIPPED | OUTPATIENT
Start: 2025-04-08 | End: 2025-04-13

## 2025-04-08 RX ORDER — BUSPIRONE HYDROCHLORIDE 10 MG/1
10 TABLET ORAL 2 TIMES DAILY
Qty: 180 TABLET | Refills: 1 | Status: SHIPPED | OUTPATIENT
Start: 2025-04-08

## 2025-04-08 RX ORDER — BUPROPION HYDROCHLORIDE 150 MG/1
150 TABLET ORAL EVERY MORNING
Qty: 90 TABLET | Refills: 1 | Status: SHIPPED | OUTPATIENT
Start: 2025-04-08

## 2025-04-08 RX ORDER — PREDNISONE 20 MG/1
40 TABLET ORAL DAILY
Qty: 10 TABLET | Refills: 0 | Status: SHIPPED | OUTPATIENT
Start: 2025-04-08 | End: 2025-04-13

## 2025-04-08 NOTE — TELEPHONE ENCOUNTER
Yo[employer] messages copied into refill encounter 4/8/25. Refill encounter routed to PCP.     Poppy Palfaox, BSN, RN

## 2025-04-08 NOTE — TELEPHONE ENCOUNTER
Kassandra Mead to [facility] (supporting You)         4/8/25  1:25 PM  I have been taking both- I swear I just had them filled no too long ago! I ll look at the bottles when I get home & see when they were filled. I stopped taking when I was pregnant, so the ones I have been taking could have just been left over that I had in the cabinet. I ll check & get back! I have even have some Buspirone left, but ran out of bupropion this morning.   Me to Kassandra Mead         4/8/25  1:20 PM  Talha Cannon,      We received refill requests for Wellbutrin and Buspar. Based on the last prescriptions Yanci sent for these, you should have run out of these medications in August 2024. Can you confirm how you have been taking these medications?      Thanks,      Poppy Palafox, KATHARINEN, RN    Last read by Kassandra Mead at  1:21 PM on 4/8/2025.

## 2025-04-08 NOTE — TELEPHONE ENCOUNTER
Both medications- Clinic RN: Please investigate patient's chart or contact patient if the information cannot be found because patient should have run out of this medication on 8/28/24. Confirm patient is taking this medication as prescribed. Document findings and route refill encounter to provider for approval or denial.

## 2025-05-13 ENCOUNTER — MYC MEDICAL ADVICE (OUTPATIENT)
Dept: FAMILY MEDICINE | Facility: CLINIC | Age: 35
End: 2025-05-13
Payer: COMMERCIAL

## 2025-05-28 ENCOUNTER — MYC REFILL (OUTPATIENT)
Dept: FAMILY MEDICINE | Facility: CLINIC | Age: 35
End: 2025-05-28
Payer: COMMERCIAL

## 2025-05-28 DIAGNOSIS — L68.9 EXCESSIVE HAIR: ICD-10-CM

## 2025-05-29 RX ORDER — SPIRONOLACTONE 50 MG/1
100 TABLET, FILM COATED ORAL DAILY
Qty: 180 TABLET | Refills: 0 | Status: SHIPPED | OUTPATIENT
Start: 2025-05-29

## 2025-07-09 ENCOUNTER — OFFICE VISIT (OUTPATIENT)
Dept: FAMILY MEDICINE | Facility: CLINIC | Age: 35
End: 2025-07-09
Payer: MEDICAID

## 2025-07-09 VITALS
OXYGEN SATURATION: 97 % | RESPIRATION RATE: 16 BRPM | BODY MASS INDEX: 34.32 KG/M2 | DIASTOLIC BLOOD PRESSURE: 76 MMHG | HEIGHT: 65 IN | HEART RATE: 82 BPM | SYSTOLIC BLOOD PRESSURE: 116 MMHG | TEMPERATURE: 97.3 F | WEIGHT: 206 LBS

## 2025-07-09 DIAGNOSIS — Z97.5 IUD (INTRAUTERINE DEVICE) IN PLACE: ICD-10-CM

## 2025-07-09 DIAGNOSIS — Z30.430 ENCOUNTER FOR INSERTION OF INTRAUTERINE CONTRACEPTIVE DEVICE (IUD): Primary | ICD-10-CM

## 2025-07-09 LAB — HCG UR QL: NEGATIVE

## 2025-07-09 PROCEDURE — 81025 URINE PREGNANCY TEST: CPT | Performed by: PHYSICIAN ASSISTANT

## 2025-07-09 PROCEDURE — 3074F SYST BP LT 130 MM HG: CPT | Performed by: PHYSICIAN ASSISTANT

## 2025-07-09 PROCEDURE — 58300 INSERT INTRAUTERINE DEVICE: CPT | Performed by: PHYSICIAN ASSISTANT

## 2025-07-09 PROCEDURE — 3078F DIAST BP <80 MM HG: CPT | Performed by: PHYSICIAN ASSISTANT

## 2025-07-09 ASSESSMENT — ANXIETY QUESTIONNAIRES
GAD7 TOTAL SCORE: 0
IF YOU CHECKED OFF ANY PROBLEMS ON THIS QUESTIONNAIRE, HOW DIFFICULT HAVE THESE PROBLEMS MADE IT FOR YOU TO DO YOUR WORK, TAKE CARE OF THINGS AT HOME, OR GET ALONG WITH OTHER PEOPLE: NOT DIFFICULT AT ALL
8. IF YOU CHECKED OFF ANY PROBLEMS, HOW DIFFICULT HAVE THESE MADE IT FOR YOU TO DO YOUR WORK, TAKE CARE OF THINGS AT HOME, OR GET ALONG WITH OTHER PEOPLE?: NOT DIFFICULT AT ALL
7. FEELING AFRAID AS IF SOMETHING AWFUL MIGHT HAPPEN: NOT AT ALL
2. NOT BEING ABLE TO STOP OR CONTROL WORRYING: NOT AT ALL
3. WORRYING TOO MUCH ABOUT DIFFERENT THINGS: NOT AT ALL
GAD7 TOTAL SCORE: 0
7. FEELING AFRAID AS IF SOMETHING AWFUL MIGHT HAPPEN: NOT AT ALL
5. BEING SO RESTLESS THAT IT IS HARD TO SIT STILL: NOT AT ALL
4. TROUBLE RELAXING: NOT AT ALL
GAD7 TOTAL SCORE: 0
1. FEELING NERVOUS, ANXIOUS, OR ON EDGE: NOT AT ALL
6. BECOMING EASILY ANNOYED OR IRRITABLE: NOT AT ALL

## 2025-07-09 ASSESSMENT — PATIENT HEALTH QUESTIONNAIRE - PHQ9
SUM OF ALL RESPONSES TO PHQ QUESTIONS 1-9: 0
SUM OF ALL RESPONSES TO PHQ QUESTIONS 1-9: 0
10. IF YOU CHECKED OFF ANY PROBLEMS, HOW DIFFICULT HAVE THESE PROBLEMS MADE IT FOR YOU TO DO YOUR WORK, TAKE CARE OF THINGS AT HOME, OR GET ALONG WITH OTHER PEOPLE: NOT DIFFICULT AT ALL

## 2025-07-09 ASSESSMENT — ENCOUNTER SYMPTOMS: NERVOUS/ANXIOUS: 1

## 2025-07-09 NOTE — PROGRESS NOTES
"IUD Insertion:  CONSULT:    Is a pregnancy test required: Yes.  Was it positive or negative?  Negative  Was a consent obtained?  Yes    Subjective: Kassandra Mead is a 34 year old  presents for IUD and desires Mirena type IUD.    Patient has been given the opportunity to ask questions about all forms of birth control, including all options appropriate for Kassandra Mead. Discussed that no method of birth control, except abstinence is 100% effective against pregnancy or sexually transmitted infection.     Kassandra Mead understands she may have the IUD removed at any time. IUD should be removed by a health care provider.    The entire insertion procedure was reviewed with the patient, including care after placement.    No LMP recorded. No allergy to betadine or shellfish. Patient declines STD screening  HCG Qual Urine   Date Value Ref Range Status   2019 Positive (A) NEG^Negative Final     Comment:     This test is for screening purposes.  Results should be interpreted along with   the clinical picture.  Confirmation testing is available if warranted by   ordering [id number], HCG Quantitative Pregnancy.       hCG Urine Qualitative   Date Value Ref Range Status   2025 Negative Negative Final     Comment:     This test is for screening purposes.  Results should be interpreted along with the clinical picture.  Confirmation testing is available if warranted by ordering [id number], HCG Quantitative Pregnancy.         /76   Pulse 82   Temp 97.3  F (36.3  C) (Temporal)   Resp 16   Ht 1.651 m (5' 5\")   Wt 93.4 kg (206 lb)   SpO2 97%   BMI 34.28 kg/m      Pelvic Exam:   EG/BUS: normal genital architecture without lesions, erythema or abnormal secretions.   Vagina: moist, pink, rugae with physiologic discharge and secretions  Cervix: no lesions and pink, moist, closed, without lesion or CMT  Uterus: anteverted position, mobile, no pain  Adnexa: within normal limits and no masses, nodularity, " tenderness    PROCEDURE NOTE: -- IUD Insertion    Reason for Insertion: contraception    Premedicated with ibuprofen.  Under sterile technique, cervix was visualized with speculum and prepped with Betadine solution swab x 3. Tenaculum was placed for stability. The uterus was gently straightened and sounded to 8.0 cm. IUD prepared for placement, and IUD inserted according to 's instructions without difficulty or significant resitance, and deployed at the fundus. The strings were visualized and trimmed to 3.0 cm from the external os. Tenaculum was removed and hemostasis noted. Speculum removed.  Patient tolerated procedure well.    EBL: minimal    Complications: none    ASSESSMENT:     ICD-10-CM    1. Encounter for insertion of intrauterine contraceptive device (IUD)  Z30.430 HCG Qual, Urine ([id number])     levonorgestrel (MIRENA) 52 MG (20 mcg/day) IUD 1 each     INSERTION INTRAUTERINE DEVICE           PLAN:    Given 's handouts, including when to have IUD removed, list of danger s/sx, side effects and follow up recommended. Encouraged condom use for prevention of STD. Back up contraception advised for 7 days if progestin method. Advised to call for any fever, for prolonged or severe pain or bleeding, abnormal vaginal discharge, or unable to palpate strings. She was advised to use pain medications (ibuprofen) as needed for mild to moderate pain. Advised to follow-up in clinic in 4-6 weeks for IUD string check if unable to find strings or as directed by provider.     Yanci Patterson PA-C

## 2025-07-09 NOTE — PROGRESS NOTES
"  {PROVIDER CHARTING PREFERENCE:354426}    Subjective   Kassandra is a 34 year old, presenting for the following health issues:  IUD and Anxiety        7/9/2025     7:12 AM   Additional Questions   Roomed by Yanci CHRISTIE   Accompanied by daughter     Contraception    Anxiety    History of Present Illness       Reason for visit:  Birth control   She is taking medications regularly.          {SUPERLIST (Optional):228709}  {additonal problems for provider to add (Optional):223024}    {ROS Picklists (Optional):427255}      Objective    /76   Pulse 82   Temp 97.3  F (36.3  C) (Temporal)   Resp 16   Ht 1.651 m (5' 5\")   Wt 93.4 kg (206 lb)   SpO2 97%   BMI 34.28 kg/m    Body mass index is 34.28 kg/m .  Physical Exam   {Exam List (Optional):551147}    {Diagnostic Test Results (Optional):957852}        Signed Electronically by: Yanci Patterson PA-C  {Email feedback regarding this note to [email or web address]   :395429}  "

## 2025-07-19 ENCOUNTER — HEALTH MAINTENANCE LETTER (OUTPATIENT)
Age: 35
End: 2025-07-19

## 2025-07-29 ENCOUNTER — MYC MEDICAL ADVICE (OUTPATIENT)
Dept: FAMILY MEDICINE | Facility: CLINIC | Age: 35
End: 2025-07-29
Payer: MEDICAID

## 2025-07-29 DIAGNOSIS — R41.840 CONCENTRATION DEFICIT: Primary | ICD-10-CM

## 2025-08-27 ENCOUNTER — MYC MEDICAL ADVICE (OUTPATIENT)
Dept: FAMILY MEDICINE | Facility: CLINIC | Age: 35
End: 2025-08-27
Payer: MEDICAID

## 2025-08-27 ENCOUNTER — MYC REFILL (OUTPATIENT)
Dept: FAMILY MEDICINE | Facility: CLINIC | Age: 35
End: 2025-08-27
Payer: MEDICAID

## 2025-08-27 DIAGNOSIS — E66.01 MORBID OBESITY (H): ICD-10-CM

## 2025-08-27 DIAGNOSIS — L70.9 ACNE, UNSPECIFIED ACNE TYPE: ICD-10-CM

## 2025-08-27 RX ORDER — PHENTERMINE HYDROCHLORIDE 30 MG/1
30 CAPSULE ORAL EVERY MORNING
Qty: 30 CAPSULE | Refills: 5 | OUTPATIENT
Start: 2025-08-27

## 2025-08-27 RX ORDER — PHENTERMINE HYDROCHLORIDE 30 MG/1
30 CAPSULE ORAL EVERY MORNING
Qty: 30 CAPSULE | Refills: 0 | Status: SHIPPED | OUTPATIENT
Start: 2025-08-27

## 2025-08-27 RX ORDER — CLINDAMYCIN PHOSPHATE 10 MG/G
GEL TOPICAL 2 TIMES DAILY
Qty: 60 G | Refills: 2 | Status: SHIPPED | OUTPATIENT
Start: 2025-08-27

## (undated) DEVICE — LABEL MEDICATION SYSTEM  3304

## (undated) DEVICE — PEN MARKING SKIN

## (undated) DEVICE — PACK BASIC SET-UP 9101

## (undated) DEVICE — PREP POVIDONE IODINE SOLUTION 10% 120ML

## (undated) DEVICE — LUBRICATING JELLY 4.25OZ

## (undated) DEVICE — SPONGE RAY-TEC 4X4" 7317

## (undated) DEVICE — GLOVE PROTEXIS W/NEU-THERA 5.5  2D73TE55

## (undated) DEVICE — GLOVE PROTEXIS BLUE W/NEU-THERA 6.0  2D73EB60

## (undated) DEVICE — TUBING IRR SUCTION SET GYRUS

## (undated) DEVICE — NDL SPINAL WHITACRE 22GA 3.5" 405010

## (undated) DEVICE — BASIN SET MINOR DISP

## (undated) DEVICE — SUCTION CANNULA UTERINE 10MM CVD  21553

## (undated) DEVICE — PREP SKIN SCRUB TRAY 4461A

## (undated) DEVICE — CATH SELF FEMALE 14FR 6"

## (undated) DEVICE — TUBING SUCTION VACUUM COLLECTION 6FT 610

## (undated) DEVICE — PREP POVIDONE IODINE SCRUB 7.5% 120ML

## (undated) DEVICE — GOWN XLG DISP 9545

## (undated) DEVICE — SYR 10ML FINGER CONTROL W/O NDL 309695

## (undated) RX ORDER — DEXAMETHASONE SODIUM PHOSPHATE 10 MG/ML
INJECTION, SOLUTION INTRAMUSCULAR; INTRAVENOUS
Status: DISPENSED
Start: 2020-01-20

## (undated) RX ORDER — FENTANYL CITRATE 50 UG/ML
INJECTION, SOLUTION INTRAMUSCULAR; INTRAVENOUS
Status: DISPENSED
Start: 2020-01-20

## (undated) RX ORDER — LIDOCAINE HYDROCHLORIDE 20 MG/ML
INJECTION, SOLUTION EPIDURAL; INFILTRATION; INTRACAUDAL; PERINEURAL
Status: DISPENSED
Start: 2020-01-20

## (undated) RX ORDER — ONDANSETRON 2 MG/ML
INJECTION INTRAMUSCULAR; INTRAVENOUS
Status: DISPENSED
Start: 2020-01-20

## (undated) RX ORDER — LIDOCAINE HYDROCHLORIDE AND EPINEPHRINE 10; 10 MG/ML; UG/ML
INJECTION, SOLUTION INFILTRATION; PERINEURAL
Status: DISPENSED
Start: 2020-01-20

## (undated) RX ORDER — PROPOFOL 10 MG/ML
INJECTION, EMULSION INTRAVENOUS
Status: DISPENSED
Start: 2020-01-20